# Patient Record
Sex: FEMALE | Race: WHITE | NOT HISPANIC OR LATINO | URBAN - METROPOLITAN AREA
[De-identification: names, ages, dates, MRNs, and addresses within clinical notes are randomized per-mention and may not be internally consistent; named-entity substitution may affect disease eponyms.]

---

## 2018-01-25 ENCOUNTER — INPATIENT (INPATIENT)
Facility: HOSPITAL | Age: 83
LOS: 5 days | Discharge: NURSING, REHAB OR LONG-TERM CARE | End: 2018-01-31
Attending: HOSPITALIST | Admitting: HOSPITALIST
Payer: MEDICARE

## 2018-01-25 VITALS
SYSTOLIC BLOOD PRESSURE: 145 MMHG | HEART RATE: 52 BPM | DIASTOLIC BLOOD PRESSURE: 62 MMHG | TEMPERATURE: 98 F | RESPIRATION RATE: 16 BRPM | OXYGEN SATURATION: 100 %

## 2018-01-25 DIAGNOSIS — R45.89 OTHER SYMPTOMS AND SIGNS INVOLVING EMOTIONAL STATE: ICD-10-CM

## 2018-01-25 LAB
ALBUMIN SERPL ELPH-MCNC: 4.1 G/DL — SIGNIFICANT CHANGE UP (ref 3.3–5)
ALP SERPL-CCNC: 91 U/L — SIGNIFICANT CHANGE UP (ref 40–120)
ALT FLD-CCNC: 22 U/L — SIGNIFICANT CHANGE UP (ref 4–33)
AMPHET UR-MCNC: NEGATIVE — SIGNIFICANT CHANGE UP
APAP SERPL-MCNC: < 15 UG/ML — LOW (ref 15–25)
APPEARANCE UR: CLEAR — SIGNIFICANT CHANGE UP
AST SERPL-CCNC: 23 U/L — SIGNIFICANT CHANGE UP (ref 4–32)
BACTERIA # UR AUTO: SIGNIFICANT CHANGE UP
BARBITURATES MEASUREMENT: NEGATIVE — SIGNIFICANT CHANGE UP
BARBITURATES UR SCN-MCNC: NEGATIVE — SIGNIFICANT CHANGE UP
BASE EXCESS BLDV CALC-SCNC: 3.1 MMOL/L — SIGNIFICANT CHANGE UP
BASOPHILS # BLD AUTO: 0.04 K/UL — SIGNIFICANT CHANGE UP (ref 0–0.2)
BASOPHILS NFR BLD AUTO: 0.5 % — SIGNIFICANT CHANGE UP (ref 0–2)
BENZODIAZ SERPL-MCNC: NEGATIVE — SIGNIFICANT CHANGE UP
BENZODIAZ UR-MCNC: NEGATIVE — SIGNIFICANT CHANGE UP
BILIRUB SERPL-MCNC: 0.4 MG/DL — SIGNIFICANT CHANGE UP (ref 0.2–1.2)
BILIRUB UR-MCNC: NEGATIVE — SIGNIFICANT CHANGE UP
BLOOD GAS VENOUS - CREATININE: 0.98 MG/DL — SIGNIFICANT CHANGE UP (ref 0.5–1.3)
BLOOD UR QL VISUAL: NEGATIVE — SIGNIFICANT CHANGE UP
BUN SERPL-MCNC: 17 MG/DL — SIGNIFICANT CHANGE UP (ref 7–23)
CALCIUM SERPL-MCNC: 9.1 MG/DL — SIGNIFICANT CHANGE UP (ref 8.4–10.5)
CANNABINOIDS UR-MCNC: NEGATIVE — SIGNIFICANT CHANGE UP
CHLORIDE BLDV-SCNC: 101 MMOL/L — SIGNIFICANT CHANGE UP (ref 96–108)
CHLORIDE SERPL-SCNC: 100 MMOL/L — SIGNIFICANT CHANGE UP (ref 98–107)
CO2 SERPL-SCNC: 22 MMOL/L — SIGNIFICANT CHANGE UP (ref 22–31)
COCAINE METAB.OTHER UR-MCNC: NEGATIVE — SIGNIFICANT CHANGE UP
COLOR SPEC: YELLOW — SIGNIFICANT CHANGE UP
CREAT SERPL-MCNC: 0.98 MG/DL — SIGNIFICANT CHANGE UP (ref 0.5–1.3)
EOSINOPHIL # BLD AUTO: 0.26 K/UL — SIGNIFICANT CHANGE UP (ref 0–0.5)
EOSINOPHIL NFR BLD AUTO: 3.1 % — SIGNIFICANT CHANGE UP (ref 0–6)
ETHANOL BLD-MCNC: < 10 MG/DL — SIGNIFICANT CHANGE UP
FOLATE SERPL-MCNC: > 20 NG/ML — HIGH (ref 4.7–20)
GAS PNL BLDV: 137 MMOL/L — SIGNIFICANT CHANGE UP (ref 136–146)
GLUCOSE BLDV-MCNC: 98 — SIGNIFICANT CHANGE UP (ref 70–99)
GLUCOSE SERPL-MCNC: 94 MG/DL — SIGNIFICANT CHANGE UP (ref 70–99)
GLUCOSE UR-MCNC: NEGATIVE — SIGNIFICANT CHANGE UP
HCO3 BLDV-SCNC: 25 MMOL/L — SIGNIFICANT CHANGE UP (ref 20–27)
HCT VFR BLD CALC: 38.9 % — SIGNIFICANT CHANGE UP (ref 34.5–45)
HCT VFR BLDV CALC: 40.7 % — SIGNIFICANT CHANGE UP (ref 34.5–45)
HGB BLD-MCNC: 12.4 G/DL — SIGNIFICANT CHANGE UP (ref 11.5–15.5)
HGB BLDV-MCNC: 13.2 G/DL — SIGNIFICANT CHANGE UP (ref 11.5–15.5)
IMM GRANULOCYTES # BLD AUTO: 0.02 # — SIGNIFICANT CHANGE UP
IMM GRANULOCYTES NFR BLD AUTO: 0.2 % — SIGNIFICANT CHANGE UP (ref 0–1.5)
KETONES UR-MCNC: NEGATIVE — SIGNIFICANT CHANGE UP
LACTATE BLDV-MCNC: 1.8 MMOL/L — SIGNIFICANT CHANGE UP (ref 0.5–2)
LEUKOCYTE ESTERASE UR-ACNC: NEGATIVE — SIGNIFICANT CHANGE UP
LYMPHOCYTES # BLD AUTO: 2.64 K/UL — SIGNIFICANT CHANGE UP (ref 1–3.3)
LYMPHOCYTES # BLD AUTO: 31.2 % — SIGNIFICANT CHANGE UP (ref 13–44)
MAGNESIUM SERPL-MCNC: 2.1 MG/DL — SIGNIFICANT CHANGE UP (ref 1.6–2.6)
MCHC RBC-ENTMCNC: 29.7 PG — SIGNIFICANT CHANGE UP (ref 27–34)
MCHC RBC-ENTMCNC: 31.9 % — LOW (ref 32–36)
MCV RBC AUTO: 93.3 FL — SIGNIFICANT CHANGE UP (ref 80–100)
METHADONE UR-MCNC: NEGATIVE — SIGNIFICANT CHANGE UP
MONOCYTES # BLD AUTO: 1.05 K/UL — HIGH (ref 0–0.9)
MONOCYTES NFR BLD AUTO: 12.4 % — SIGNIFICANT CHANGE UP (ref 2–14)
NEUTROPHILS # BLD AUTO: 4.45 K/UL — SIGNIFICANT CHANGE UP (ref 1.8–7.4)
NEUTROPHILS NFR BLD AUTO: 52.6 % — SIGNIFICANT CHANGE UP (ref 43–77)
NITRITE UR-MCNC: POSITIVE — HIGH
NRBC # FLD: 0 — SIGNIFICANT CHANGE UP
OPIATES UR-MCNC: NEGATIVE — SIGNIFICANT CHANGE UP
OXYCODONE UR-MCNC: NEGATIVE — SIGNIFICANT CHANGE UP
PCO2 BLDV: 52 MMHG — HIGH (ref 41–51)
PCP UR-MCNC: NEGATIVE — SIGNIFICANT CHANGE UP
PH BLDV: 7.35 PH — SIGNIFICANT CHANGE UP (ref 7.32–7.43)
PH UR: 6 — SIGNIFICANT CHANGE UP (ref 4.6–8)
PHOSPHATE SERPL-MCNC: 4.6 MG/DL — HIGH (ref 2.5–4.5)
PLATELET # BLD AUTO: 253 K/UL — SIGNIFICANT CHANGE UP (ref 150–400)
PMV BLD: 10.6 FL — SIGNIFICANT CHANGE UP (ref 7–13)
PO2 BLDV: 33 MMHG — LOW (ref 35–40)
POTASSIUM BLDV-SCNC: 4.5 MMOL/L — SIGNIFICANT CHANGE UP (ref 3.4–4.5)
POTASSIUM SERPL-MCNC: 4.8 MMOL/L — SIGNIFICANT CHANGE UP (ref 3.5–5.3)
POTASSIUM SERPL-SCNC: 4.8 MMOL/L — SIGNIFICANT CHANGE UP (ref 3.5–5.3)
PROT SERPL-MCNC: 6.8 G/DL — SIGNIFICANT CHANGE UP (ref 6–8.3)
PROT UR-MCNC: NEGATIVE MG/DL — SIGNIFICANT CHANGE UP
RBC # BLD: 4.17 M/UL — SIGNIFICANT CHANGE UP (ref 3.8–5.2)
RBC # FLD: 12.9 % — SIGNIFICANT CHANGE UP (ref 10.3–14.5)
SALICYLATES SERPL-MCNC: < 5 MG/DL — LOW (ref 15–30)
SAO2 % BLDV: 52.3 % — LOW (ref 60–85)
SODIUM SERPL-SCNC: 138 MMOL/L — SIGNIFICANT CHANGE UP (ref 135–145)
SP GR SPEC: 1.01 — SIGNIFICANT CHANGE UP (ref 1–1.04)
T4 FREE SERPL-MCNC: 1.05 NG/DL — SIGNIFICANT CHANGE UP (ref 0.9–1.8)
TSH SERPL-MCNC: 6.85 UIU/ML — HIGH (ref 0.27–4.2)
UROBILINOGEN FLD QL: NORMAL MG/DL — SIGNIFICANT CHANGE UP
VIT B12 SERPL-MCNC: 433 PG/ML — SIGNIFICANT CHANGE UP (ref 200–900)
WBC # BLD: 8.46 K/UL — SIGNIFICANT CHANGE UP (ref 3.8–10.5)
WBC # FLD AUTO: 8.46 K/UL — SIGNIFICANT CHANGE UP (ref 3.8–10.5)
WBC UR QL: SIGNIFICANT CHANGE UP (ref 0–?)

## 2018-01-25 PROCEDURE — 99223 1ST HOSP IP/OBS HIGH 75: CPT

## 2018-01-25 RX ORDER — CEFTRIAXONE 500 MG/1
1 INJECTION, POWDER, FOR SOLUTION INTRAMUSCULAR; INTRAVENOUS ONCE
Qty: 0 | Refills: 0 | Status: COMPLETED | OUTPATIENT
Start: 2018-01-25 | End: 2018-01-25

## 2018-01-25 RX ADMIN — CEFTRIAXONE 100 GRAM(S): 500 INJECTION, POWDER, FOR SOLUTION INTRAMUSCULAR; INTRAVENOUS at 21:40

## 2018-01-25 NOTE — H&P ADULT - NSHPPHYSICALEXAM_GEN_ALL_CORE
Vital Signs Last 24 Hrs  T(C): 36.8 (25 Jan 2018 23:46), Max: 36.8 (25 Jan 2018 23:46)  T(F): 98.2 (25 Jan 2018 23:46), Max: 98.2 (25 Jan 2018 23:46)  HR: 55 (25 Jan 2018 23:46) (52 - 60)  BP: 154/71 (25 Jan 2018 23:46) (145/62 - 166/70)  BP(mean): --  RR: 18 (25 Jan 2018 23:46) (16 - 18)  SpO2: 95% (25 Jan 2018 23:46) (95% - 100%)    PHYSICAL EXAM:  GENERAL: No Acute Distress  HEAD:  Atraumatic, Normocephalic  EYES: PERRL, conjunctiva and sclera clear  ENMT: Moist mucous membranes   NECK: Supple  CHEST/LUNG: Clear to auscultation bilaterally  HEART: Regular rate and rhythm; 3/6 systolic ejection murmur  ABDOMEN: Soft, Nontender, large ventral hernia; Bowel sounds normal  EXTREMITIES:   No clubbing or cyanosis, trace pedal edema  PSYCH: Calm and cooperative  NEUROLOGY:   - Mental status A&O x 2 (to self and "[facility]")  SKIN: No rashes or lesions  MUSCULOSKELETAL: No joint swelling

## 2018-01-25 NOTE — ED PROVIDER NOTE - MEDICAL DECISION MAKING DETAILS
Pt is an 84 y/o F w/ a PMHx of DM, Uterine CA, Dementia w/ frequent hospital admissions at other hospitals for frequent outbursts s/p d/c from [facility] after being treated for 3 days for UTI who p/w aggressive outburst likely related to progression of dementia, low suspicion for infectious etiology and metabolic etiology but will get CBC, CMP, TSH, B12, Folate, UA, urine Culture, CXR, EKG. Patient likely will need admission for frequent outbursts and inability for family to care for patient but will reassess after w/u. Pt is an 84 y/o F w/ a PMHx of DM, Uterine CA, Severe AS, Diverticulitis s/p hemicolectomy, Dementia w/ frequent hospital admissions at other hospitals for frequent outbursts (32 in past 15 months) s/p d/c from [facility] after being treated for 3 days for UTI who p/w aggressive outburst likely related to progression of dementia, low suspicion for infectious etiology and metabolic etiology but will get CBC, CMP, VBG, TSH, B12, Folate, UA, urine Culture, CXR, EKG. Low suspicion for obstruction or diverticulitis but will consider CT Patient likely will need admission for frequent outbursts and inability for family to care for patient but will reassess after w/u but will likely need admission.

## 2018-01-25 NOTE — H&P ADULT - PMH
Arthropathy left lower leg    Benign Heart Murmur    CA - Cancer of Uterus  endometrial  Cerebrospinal Meningitis    Closed Fracture Ankle, Bimalleolar  right ankle cast immobilization  DD (Diverticular Disease)    Diabetes Mellitus Type II    History of Hemorrhoidectomy    Hyperlipemia    Lumbar Spinal Stenosis    Obesity    Osteoarthritis of Lumbar Spine    Renal Calculus    Vitreous Detachment  right eye 1990    left eye 1995

## 2018-01-25 NOTE — ED PROVIDER NOTE - ATTENDING CONTRIBUTION TO CARE
DR. HERNANDEZ, ATTENDING MD-  I performed a face to face bedside interview with patient regarding history of present illness, review of symptoms and past medical history. I completed an independent physical exam.  I have discussed patient's plan of care with the resident.   Documentation as above in the note.    86 y/o female h/o dementia, dm, ut ca bib family for aggressive outbursts towards family and elopement risk.  Per son, pt has had [facility] and since dc home has made violent outbursts to family and was discovered trying to walk into the street when left alone.  Afebrile, vs wnl, nad, ctabil, s1s2 rrr no m/r/g, abd soft non ttp no r/g, no cva tenderness b/l, no leg swelling b/l, no rash.  Progressive dementia vs organic issue such as lyte abn / occult inf.  Obtain cbc, bmp, cxr, ua, will need admission as danger to self and others at home for likely placement.

## 2018-01-25 NOTE — H&P ADULT - NSHPLABSRESULTS_GEN_ALL_CORE
138  |  100  |  17  ----------------------------<  94  4.8   |  22  |  0.98    Ca    9.1      2018 20:16  Phos  4.6       Mg     2.1         TPro  6.8  /  Alb  4.1  /  TBili  0.4  /  DBili  x   /  AST  23  /  ALT  22  /  AlkPhos  91                              12.4   8.46  )-----------( 253      ( 2018 20:16 )             38.9           Urinalysis Basic - ( 2018 20:31 )    Color: YELLOW / Appearance: CLEAR / S.010 / pH: 6.0  Gluc: NEGATIVE / Ketone: NEGATIVE  / Bili: NEGATIVE / Urobili: NORMAL mg/dL   Blood: NEGATIVE / Protein: NEGATIVE mg/dL / Nitrite: POSITIVE   Leuk Esterase: NEGATIVE / RBC: x / WBC 0-2   Sq Epi: x / Non Sq Epi: x / Bacteria: FEW     138  |  100  |  17  ----------------------------<  94  4.8   |  22  |  0.98    Ca    9.1      2018 20:16  Phos  4.6       Mg     2.1         TPro  6.8  /  Alb  4.1  /  TBili  0.4  /  DBili  x   /  AST  23  /  ALT  22  /  AlkPhos  91                              12.4   8.46  )-----------( 253      ( 2018 20:16 )             38.9           Urinalysis Basic - ( 2018 20:31 )    Color: YELLOW / Appearance: CLEAR / S.010 / pH: 6.0  Gluc: NEGATIVE / Ketone: NEGATIVE  / Bili: NEGATIVE / Urobili: NORMAL mg/dL   Blood: NEGATIVE / Protein: NEGATIVE mg/dL / Nitrite: POSITIVE   Leuk Esterase: NEGATIVE / RBC: x / WBC 0-2   Sq Epi: x / Non Sq Epi: x / Bacteria: FEW    EKG tracing reviewed: ALLAN

## 2018-01-25 NOTE — ED PROVIDER NOTE - PSH
Abnormal Uterine Bleeding  D&C  Acute Lumbar Back Pain  epidural injections x3 and radiofrequecy ablation with little pain relief  2001 another radiofrequency ablation 2002 additional radiofrequency ablation    Epidural injections: 2003, 2005 2007  Arthroplasty of the Knee  left TKR 2009  Arthroplasty of the Knee  right TKA  Arthroplasty of the Knee  right knee revision secondary to fall  Carpal Tunnel Syndrome  left  release  Contracted, Tendon Sheath  left ankle  Hammertoe  left foot  History of Arthroscopy  left knee debridement  History of Arthroscopy of Knee  right knee  History of Laminectomy  with discectomy L4-L6  History of Tonsillectomy    Maxillary Sinus Polyp  polyp exc right side 1973  S/P Cataract Surgery  Left eye with IOL 11/2004    Right eye with IOL 12/2004  S/P Excision of Acoustic Neuroma  right ear  S/P Laminectomy  L3-L5  S/P TKR (Total Knee Replacement)  right TKA revision  S/P Total Hysterectomy  for endometrial CA

## 2018-01-25 NOTE — H&P ADULT - PROBLEM SELECTOR PLAN 5
- HSQ Mild TSH elevation with normal T4.  Doubt subclinical hypothyroidism is contributing to pt's behavioral issues.

## 2018-01-25 NOTE — ED PROVIDER NOTE - NS ED ROS FT
Unable to obtain from patient due to dementia (accuracy questionable) obtained from son (See HPI) Due to dementia (accuracy questionable) obtained some from son (See HPI)

## 2018-01-25 NOTE — H&P ADULT - ASSESSMENT
86 y/o F with HTN, HLd, dementia, severe AS, DM p/w continued episodes of agitation, confusion, and behavioral issues.

## 2018-01-25 NOTE — PATIENT PROFILE ADULT. - ABILITY TO HEAR (WITH HEARING AID OR HEARING APPLIANCE IF NORMALLY USED):
Mildly to Moderately Impaired: difficulty hearing in some environments or speaker may need to increase volume or speak distinctly/hearing impaired rt ear

## 2018-01-25 NOTE — ED PROVIDER NOTE - PROGRESS NOTE DETAILS
Spoke to hospitalist and will admit. Text paged MAR.   -Carlos Shelton PGY3 EMIM Pager#[phone]/Spectra#[phone]

## 2018-01-25 NOTE — PATIENT PROFILE ADULT. - VISION (WITH CORRECTIVE LENSES IF THE PATIENT USUALLY WEARS THEM):
Partially impaired: cannot see medication labels or newsprint, but can see obstacles in path, and the surrounding layout; can count fingers at arm's length/uses glassess

## 2018-01-25 NOTE — ED PROVIDER NOTE - OBJECTIVE STATEMENT
History obtained mainly from family at bedside.   Pt is an 84 y/o F w/ a PMHx of DM, Uterine CA, Dementia w/ frequent hospital admissions at other hospitals for frequent outbursts s/p d/c from [facility] after being treated for 3 days for UTI who p/w aggressive outburst at home today with family. Pt son at bedside states that she was at home with aides and her daughter and she started yelling she was sick and was very aggressive with family.     Family states that History obtained mainly from family at bedside.   Pt is an 86 y/o F w/ a PMHx of DM, Uterine CA, Severe AS, Diverticulitis s/p hemicolectomy, Dementia w/ frequent hospital admissions at other hospitals for frequent outbursts (32 in past 15 months) s/p d/c from [facility] after being treated for 3 days for UTI who p/w aggressive outburst at home today with family. Pt son at bedside states that she was at home with aides and her daughter and she started yelling she was sick and was very aggressive with family.     Patient denies cough, nasal congestion, abdominal pain, nausea, vomiting, recent antibiotic use, diarrhea, dysuria, urinary frequency, new rashes or injuries, headaches, neck stiffness, photophobia, and sick contacts.     Son states the only thing she has complained of is lower abdominal pain since [facility], no N/V and no further diarrhea, no fevers. Son states she lives with Brother and an aid and she frequently leaves the house and feels it is unsafe for her no and she is putting herself in danger. Patient walks around normally without issue. Pt states that patient admitted to [facility] 10 days ago for diarrhea and had Ct which was negative.   Meds: Buspar 15mg BID

## 2018-01-25 NOTE — ED ADULT TRIAGE NOTE - CHIEF COMPLAINT QUOTE
Pt comes from home secondary to dementia and according to son with  uncontrolled behavior, was released Tuesday from [facility] for UTI and dehydration, pt family unable to keep patient calm at home "needs  [facility]" pt calm and pleasant in ambay no complaints of pain

## 2018-01-25 NOTE — ED ADULT NURSE NOTE - OBJECTIVE STATEMENT
Pt brought in by son for pt acting out at home and being very confused.  States multiple recent hospitalizations for UTI and dementia.  A+OX1-2.  Float RN to obtain labs and place IV.  MD aware of pt status.

## 2018-01-25 NOTE — H&P ADULT - PROBLEM SELECTOR PLAN 3
- continue metoprolol  - previously was told that she was too high risk for surgery and risk of atheroembolism was too high for TAVR

## 2018-01-25 NOTE — H&P ADULT - HISTORY OF PRESENT ILLNESS
84 y/o F with DM, endometrial ca in remission s/p RYAN, HLD, HTN, dementia, and severe AS p/w episodes of agitation, and other behavioral issues.  Pt's son reports she has become increasingly difficult to manage.  Pt lives with her son with HHA during the daytime.  Pt has had multiple hospital admissions recently between [facility] and [facility] for episodes of agitation in the setting of diarrhea or UTI's.  Most recently, she was discharged from [facility] 3 days ago after treatment of UTI.  Pt, since then has had continued behavioral issues, and has been telling her son she has abdominal pain.  He is not sure if she truly has pain or is attempting to draw attention.  Son reports that pt has been "lashing out" described as yelling at him and his brother, but no physical violence.  Pt has had periods of waxing and waning confusion.  She also attempts to leave her home and wander the neighborhood without her HHA.  She also often calls EMS with various complaints including chest pain which son states he thinks she makes up.  She has been on various psychiatric and dementia medications including Aricept which was stopped 2/2 concern it was causing diarrhea, Seroquel which was d/c'd a few months ago, and buspirone which helps keep her behavior manageable.   No hallucinations as per son.    Pt presently reports no complaints.  She is aware she is in the hospital, but unsure why.  She presently feels well, and denies dysuria, abd pain, diarrhea or other complaints    In the ED, pt had nitrite + on UA, and was given ceftriaxone

## 2018-01-25 NOTE — ED PROVIDER NOTE - CARE PLAN
Principal Discharge DX:	Aggression Principal Discharge DX:	Aggression  Goal:	admit  Secondary Diagnosis:	UTI (urinary tract infection)

## 2018-01-26 DIAGNOSIS — R82.90 UNSPECIFIED ABNORMAL FINDINGS IN URINE: ICD-10-CM

## 2018-01-26 DIAGNOSIS — E11.9 TYPE 2 DIABETES MELLITUS WITHOUT COMPLICATIONS: ICD-10-CM

## 2018-01-26 DIAGNOSIS — R94.6 ABNORMAL RESULTS OF THYROID FUNCTION STUDIES: ICD-10-CM

## 2018-01-26 DIAGNOSIS — Z29.9 ENCOUNTER FOR PROPHYLACTIC MEASURES, UNSPECIFIED: ICD-10-CM

## 2018-01-26 DIAGNOSIS — G30.1 ALZHEIMER'S DISEASE WITH LATE ONSET: ICD-10-CM

## 2018-01-26 DIAGNOSIS — F05 DELIRIUM DUE TO KNOWN PHYSIOLOGICAL CONDITION: ICD-10-CM

## 2018-01-26 DIAGNOSIS — I35.0 NONRHEUMATIC AORTIC (VALVE) STENOSIS: ICD-10-CM

## 2018-01-26 LAB
GLUCOSE BLDC GLUCOMTR-MCNC: 108 MG/DL — HIGH (ref 70–99)
GLUCOSE BLDC GLUCOMTR-MCNC: 123 MG/DL — HIGH (ref 70–99)
GLUCOSE BLDC GLUCOMTR-MCNC: 93 MG/DL — SIGNIFICANT CHANGE UP (ref 70–99)

## 2018-01-26 PROCEDURE — 99233 SBSQ HOSP IP/OBS HIGH 50: CPT

## 2018-01-26 PROCEDURE — 99223 1ST HOSP IP/OBS HIGH 75: CPT

## 2018-01-26 RX ORDER — LORATADINE 10 MG/1
10 TABLET ORAL DAILY
Qty: 0 | Refills: 0 | Status: DISCONTINUED | OUTPATIENT
Start: 2018-01-26 | End: 2018-01-31

## 2018-01-26 RX ORDER — QUETIAPINE FUMARATE 200 MG/1
12.5 TABLET, FILM COATED ORAL EVERY 6 HOURS
Qty: 0 | Refills: 0 | Status: DISCONTINUED | OUTPATIENT
Start: 2018-01-26 | End: 2018-01-27

## 2018-01-26 RX ORDER — DEXTROSE 50 % IN WATER 50 %
25 SYRINGE (ML) INTRAVENOUS ONCE
Qty: 0 | Refills: 0 | Status: DISCONTINUED | OUTPATIENT
Start: 2018-01-26 | End: 2018-01-31

## 2018-01-26 RX ORDER — PANTOPRAZOLE SODIUM 20 MG/1
40 TABLET, DELAYED RELEASE ORAL
Qty: 0 | Refills: 0 | Status: DISCONTINUED | OUTPATIENT
Start: 2018-01-26 | End: 2018-01-31

## 2018-01-26 RX ORDER — DEXTROSE 50 % IN WATER 50 %
1 SYRINGE (ML) INTRAVENOUS ONCE
Qty: 0 | Refills: 0 | Status: DISCONTINUED | OUTPATIENT
Start: 2018-01-26 | End: 2018-01-31

## 2018-01-26 RX ORDER — INSULIN LISPRO 100/ML
VIAL (ML) SUBCUTANEOUS
Qty: 0 | Refills: 0 | Status: DISCONTINUED | OUTPATIENT
Start: 2018-01-26 | End: 2018-01-31

## 2018-01-26 RX ORDER — GLUCAGON INJECTION, SOLUTION 0.5 MG/.1ML
1 INJECTION, SOLUTION SUBCUTANEOUS ONCE
Qty: 0 | Refills: 0 | Status: DISCONTINUED | OUTPATIENT
Start: 2018-01-26 | End: 2018-01-31

## 2018-01-26 RX ORDER — METOPROLOL TARTRATE 50 MG
25 TABLET ORAL DAILY
Qty: 0 | Refills: 0 | Status: DISCONTINUED | OUTPATIENT
Start: 2018-01-26 | End: 2018-01-31

## 2018-01-26 RX ORDER — HEPARIN SODIUM 5000 [USP'U]/ML
5000 INJECTION INTRAVENOUS; SUBCUTANEOUS EVERY 8 HOURS
Qty: 0 | Refills: 0 | Status: DISCONTINUED | OUTPATIENT
Start: 2018-01-26 | End: 2018-01-31

## 2018-01-26 RX ORDER — INSULIN LISPRO 100/ML
VIAL (ML) SUBCUTANEOUS AT BEDTIME
Qty: 0 | Refills: 0 | Status: DISCONTINUED | OUTPATIENT
Start: 2018-01-26 | End: 2018-01-31

## 2018-01-26 RX ORDER — QUETIAPINE FUMARATE 200 MG/1
12.5 TABLET, FILM COATED ORAL
Qty: 0 | Refills: 0 | Status: DISCONTINUED | OUTPATIENT
Start: 2018-01-26 | End: 2018-01-29

## 2018-01-26 RX ORDER — DEXTROSE 50 % IN WATER 50 %
12.5 SYRINGE (ML) INTRAVENOUS ONCE
Qty: 0 | Refills: 0 | Status: DISCONTINUED | OUTPATIENT
Start: 2018-01-26 | End: 2018-01-31

## 2018-01-26 RX ORDER — ASPIRIN/CALCIUM CARB/MAGNESIUM 324 MG
81 TABLET ORAL DAILY
Qty: 0 | Refills: 0 | Status: DISCONTINUED | OUTPATIENT
Start: 2018-01-26 | End: 2018-01-31

## 2018-01-26 RX ADMIN — PANTOPRAZOLE SODIUM 40 MILLIGRAM(S): 20 TABLET, DELAYED RELEASE ORAL at 05:51

## 2018-01-26 RX ADMIN — QUETIAPINE FUMARATE 12.5 MILLIGRAM(S): 200 TABLET, FILM COATED ORAL at 21:16

## 2018-01-26 RX ADMIN — QUETIAPINE FUMARATE 12.5 MILLIGRAM(S): 200 TABLET, FILM COATED ORAL at 12:02

## 2018-01-26 RX ADMIN — LORATADINE 10 MILLIGRAM(S): 10 TABLET ORAL at 11:14

## 2018-01-26 RX ADMIN — Medication 25 MILLIGRAM(S): at 09:37

## 2018-01-26 RX ADMIN — HEPARIN SODIUM 5000 UNIT(S): 5000 INJECTION INTRAVENOUS; SUBCUTANEOUS at 05:51

## 2018-01-26 RX ADMIN — HEPARIN SODIUM 5000 UNIT(S): 5000 INJECTION INTRAVENOUS; SUBCUTANEOUS at 21:16

## 2018-01-26 RX ADMIN — HEPARIN SODIUM 5000 UNIT(S): 5000 INJECTION INTRAVENOUS; SUBCUTANEOUS at 17:23

## 2018-01-26 RX ADMIN — Medication 15 MILLIGRAM(S): at 09:37

## 2018-01-26 RX ADMIN — Medication 15 MILLIGRAM(S): at 17:23

## 2018-01-26 RX ADMIN — Medication 81 MILLIGRAM(S): at 11:14

## 2018-01-26 NOTE — PROGRESS NOTE ADULT - ASSESSMENT
86 y/o F with HTN, HLd, dementia, severe AS, DM p/w continued episodes of agitation, confusion, and behavioral issues. Patient is an 86 yo woman with HTN, HLd, dementia, severe AS, and DM admitted for behavioral disturbances and agitation likely due to progressive dementia. Patient is unsafe to be discharged home given family account of pt's decline in cognition.

## 2018-01-26 NOTE — PROGRESS NOTE ADULT - PROBLEM SELECTOR PLAN 1
- will continue buspirone   - buddy psych in am  - CM for ? increase in services at home Likely due to progressive symptoms. Patient currently calm and hemodynamically stable. Exam and labs unremarkable.   - will continue buspirone   - pending psych eval

## 2018-01-26 NOTE — BEHAVIORAL HEALTH ASSESSMENT NOTE - RISK ASSESSMENT
Pt is not at acute elevated risk to self or others. She has chronic risk factors including dementia, impulsive behavior that put her at a chronic elevated risk of harm to self/others. Protective factors include stability of domicile, lack of suicidality, lack of homicidality.

## 2018-01-26 NOTE — PROGRESS NOTE ADULT - SUBJECTIVE AND OBJECTIVE BOX
Patient is a 85y old  Female who presents with a chief complaint of agitation (2018 23:23)        SUBJECTIVE / OVERNIGHT EVENTS:      MEDICATIONS  (STANDING):  aspirin enteric coated 81 milliGRAM(s) Oral daily  busPIRone 15 milliGRAM(s) Oral <User Schedule>  dextrose 50% Injectable 12.5 Gram(s) IV Push once  dextrose 50% Injectable 25 Gram(s) IV Push once  dextrose 50% Injectable 25 Gram(s) IV Push once  heparin  Injectable 5000 Unit(s) SubCutaneous every 8 hours  insulin lispro (HumaLOG) corrective regimen sliding scale   SubCutaneous three times a day before meals  insulin lispro (HumaLOG) corrective regimen sliding scale   SubCutaneous at bedtime  loratadine 10 milliGRAM(s) Oral daily  metoprolol succinate ER 25 milliGRAM(s) Oral daily  pantoprazole    Tablet 40 milliGRAM(s) Oral before breakfast    MEDICATIONS  (PRN):  dextrose Gel 1 Dose(s) Oral once PRN Blood Glucose LESS THAN 70 milliGRAM(s)/deciliter  glucagon  Injectable 1 milliGRAM(s) IntraMuscular once PRN Glucose LESS THAN 70 milligrams/deciliter      Vital Signs Last 24 Hrs  T(C): 37.1 (2018 05:49), Max: 37.1 (2018 05:49)  T(F): 98.8 (2018 05:49), Max: 98.8 (2018 05:49)  HR: 61 (2018 09:24) (52 - 61)  BP: 153/63 (2018 09:24) (139/64 - 166/70)  BP(mean): --  RR: 18 (2018 05:49) (16 - 18)  SpO2: 96% (2018 05:49) (95% - 100%)  CAPILLARY BLOOD GLUCOSE      POCT Blood Glucose.: 111 mg/dL (2018 07:53)    I&O's Summary        PHYSICAL EXAM  GENERAL: NAD, well-developed  HEAD:  Atraumatic, Normocephalic  EYES: EOMI, PERRLA, conjunctiva and sclera clear  NECK: Supple, No JVD  CHEST/LUNG: Clear to auscultation bilaterally; No wheeze  HEART: Regular rate and rhythm; No murmurs, rubs, or gallops  ABDOMEN: Soft, Nontender, Nondistended; Bowel sounds present  EXTREMITIES:  2+ Peripheral Pulses, No clubbing, cyanosis, or edema  PSYCH: AAOx3  SKIN: No rashes or lesions    LABS:                        12.4   8.46  )-----------( 253      ( 2018 20:16 )             38.9         138  |  100  |  17  ----------------------------<  94  4.8   |  22  |  0.98    Ca    9.1      2018 20:16  Phos  4.6       Mg     2.1         TPro  6.8  /  Alb  4.1  /  TBili  0.4  /  DBili  x   /  AST  23  /  ALT  22  /  AlkPhos  91            Urinalysis Basic - ( 2018 20:31 )    Color: YELLOW / Appearance: CLEAR / S.010 / pH: 6.0  Gluc: NEGATIVE / Ketone: NEGATIVE  / Bili: NEGATIVE / Urobili: NORMAL mg/dL   Blood: NEGATIVE / Protein: NEGATIVE mg/dL / Nitrite: POSITIVE   Leuk Esterase: NEGATIVE / RBC: x / WBC 0-2   Sq Epi: x / Non Sq Epi: x / Bacteria: FEW        RADIOLOGY & ADDITIONAL TESTS:    Imaging Personally Reviewed:  Consultant(s) Notes Reviewed:    Care Discussed with Consultants/Other Providers: Patient is a 85y old  Female who presents with a chief complaint of agitation (2018 23:23)    SUBJECTIVE / OVERNIGHT EVENTS:  Patient with no complaints. She states she feels fine. She cannot recall why she is in the hospital. Spoke to patient's son, Terrell (also pt's HCP), who was present at bedside. He states patient is not safe at home due to progressive dementia. Patient wanders and is very forgetful. Terrell and his brother, Roberto, do not have enough support at home to be able to care for the patient. Therefore, Terrell would prefer pt be sent to long-term care facility.     MEDICATIONS  (STANDING):  aspirin enteric coated 81 milliGRAM(s) Oral daily  busPIRone 15 milliGRAM(s) Oral <User Schedule>  dextrose 50% Injectable 12.5 Gram(s) IV Push once  dextrose 50% Injectable 25 Gram(s) IV Push once  dextrose 50% Injectable 25 Gram(s) IV Push once  heparin  Injectable 5000 Unit(s) SubCutaneous every 8 hours  insulin lispro (HumaLOG) corrective regimen sliding scale   SubCutaneous three times a day before meals  insulin lispro (HumaLOG) corrective regimen sliding scale   SubCutaneous at bedtime  loratadine 10 milliGRAM(s) Oral daily  metoprolol succinate ER 25 milliGRAM(s) Oral daily  pantoprazole    Tablet 40 milliGRAM(s) Oral before breakfast    MEDICATIONS  (PRN):  dextrose Gel 1 Dose(s) Oral once PRN Blood Glucose LESS THAN 70 milliGRAM(s)/deciliter  glucagon  Injectable 1 milliGRAM(s) IntraMuscular once PRN Glucose LESS THAN 70 milligrams/deciliter      Vital Signs Last 24 Hrs  T(C): 37.1 (2018 05:49), Max: 37.1 (2018 05:49)  T(F): 98.8 (2018 05:49), Max: 98.8 (2018 05:49)  HR: 61 (2018 09:24) (52 - 61)  BP: 153/63 (2018 09:24) (139/64 - 166/70)  BP(mean): --  RR: 18 (2018 05:49) (16 - 18)  SpO2: 96% (2018 05:49) (95% - 100%)  CAPILLARY BLOOD GLUCOSE      POCT Blood Glucose.: 111 mg/dL (2018 07:53)        PHYSICAL EXAM  GENERAL: Elderly woman in NAD, well-developed  EYES: EOMI, normal conjunctiva and sclera clear  CHEST/LUNG: Clear to auscultation bilaterally; No wheeze  HEART: Regular rate and rhythm; Grade 3/6 systolic murmur heard throughout precordium. No rubs, or gallops  ABDOMEN: Soft, Nontender, Nondistended; Bowel sounds present. large RLQ protruding hernia - reducible, non-tender.  EXTREMITIES:  2+ Peripheral Pulses, No clubbing, cyanosis, or edema  PSYCH: Alert. Cooperative. Fluent speech. Pleasant  SKIN: No rashes or lesions      LABS:                        12.4   8.46  )-----------( 253      ( 2018 20:16 )             38.9         138  |  100  |  17  ----------------------------<  94  4.8   |  22  |  0.98    Ca    9.1      2018 20:16  Phos  4.6       Mg     2.1         TPro  6.8  /  Alb  4.1  /  TBili  0.4  /  DBili  x   /  AST  23  /  ALT  22  /  AlkPhos  91            Urinalysis Basic - ( 2018 20:31 )    Color: YELLOW / Appearance: CLEAR / S.010 / pH: 6.0  Gluc: NEGATIVE / Ketone: NEGATIVE  / Bili: NEGATIVE / Urobili: NORMAL mg/dL   Blood: NEGATIVE / Protein: NEGATIVE mg/dL / Nitrite: POSITIVE   Leuk Esterase: NEGATIVE / RBC: x / WBC 0-2   Sq Epi: x / Non Sq Epi: x / Bacteria: FEW        Consultant(s) Notes Reviewed:  Yes  Care Discussed with Consultants/Other Providers: Yes

## 2018-01-26 NOTE — PROGRESS NOTE ADULT - PROBLEM SELECTOR PLAN 2
+ Nitrite, but minimal WBC.  Likely colonization  - observe off abx and f/u culture + Nitrite, but minimal WBC.  Likely colonization. Patient asym[tomatic  - continue to observe off abx and f/u culture

## 2018-01-26 NOTE — PROGRESS NOTE ADULT - PROBLEM SELECTOR PLAN 4
- hold Tradjenta   - Sliding scale humalog - hold Tradjenta   - Sliding scale humalog  - check HbA1c

## 2018-01-26 NOTE — BEHAVIORAL HEALTH ASSESSMENT NOTE - HPI (INCLUDE ILLNESS QUALITY, SEVERITY, DURATION, TIMING, CONTEXT, MODIFYING FACTORS, ASSOCIATED SIGNS AND SYMPTOMS)
Pt is a 84 y/o F, domiciled with son, reported history of late onset Alzheimer's dementia with behavioral disturbances, unknown past psychiatric history, HHA during daytime, past medical history of DM, endometrial cancer in remission s/p RYAN, HLD, HTN, severe AS p/w episodes of agitation. Psychiatry called for management of agitation.     Per H&P, pt has had multiple hospital admissions recently between [facility] and [facility] for episodes of agitation in the setting of diarrhea or UTI's.  Most recently, she was discharged from [facility] 3 days ago after treatment of UTI.  Pt, since then has had continued behavioral issues, and has been telling her son she has abdominal pain.  He is not sure if she truly has pain or is attempting to draw attention.  Son reports that pt has been "lashing out" described as yelling at him and his brother, but no physical violence.  Pt has had periods of waxing and waning confusion.  She also attempts to leave her home and wander the neighborhood without her HHA.  She also often calls EMS with various complaints including chest pain which son states he thinks she makes up.  She has been on various psychiatric and dementia medications including Aricept which was stopped 2/2 concern it was causing diarrhea, Seroquel which was d/c'd a few months ago, and buspirone which helps keep her behavior manageable.   No hallucinations as per son.    On interview today, patient is calm and cooperative. Interview is limited secondary to confusion. She states that she does not know why she is in the hospital. She asks where her family is and states she wants to go home. States she lives with her  and younger son in [place]. Denies psychiatric history. She is oriented to her name, and situation (hospital), unable to name hospital. She is unable to name the state she is in, but knows that she lives in [place]. She states the year as 1990 and doesn't know the month or day. She is able to state her birthday and able to name the current president. She denies S/H/I/I/P, denies symptoms of depression. Denies alcohol/drug use. She feels safe in the hospital, but wants to go home. Denies auditory/visual hallucinations.

## 2018-01-26 NOTE — PROGRESS NOTE ADULT - PROBLEM SELECTOR PLAN 6
- HSQ - HSQ  - Contacts: Terrellpari Segalsingh (son/HCP) [phone]                  Roberto Segalsingh (son) [phone]      DISPO: Spoke with , Otto and Denisse WOODS today --> will initiate process to register pt for long-term facility care. Otherwise, patient with no acute medical issues. Patient is medically stable for discharge once facility placement is arranged.

## 2018-01-26 NOTE — BEHAVIORAL HEALTH ASSESSMENT NOTE - SUMMARY
Pt is a 86 y/o F, domiciled with son, reported history of late onset Alzheimer's dementia with behavioral disturbances, unknown past psychiatric history, HHA during daytime, past medical history of DM, endometrial cancer in remission s/p RYAN, HLD, HTN, severe AS p/w episodes of agitation. Psychiatry called for management of agitation. Per records, patient has been hospitalized numerous times for agitation in the setting of diarrhea or UTIs. She continues to have behavioral issues at home, lashing out, with waxing and waning confusion, though no violence. Has wandering behaviors and frequent somatic complaints. She has been trialled on Aricept, which was stopped secondary to diarrhea, and seroquel, which was D'C a few months ago for unknown reasons. Per son buspirone helps her behavior. Pt currently calm and cooperative with interview, though confused and oriented to situaton and name only. Presentation consistent with dementia with behavioral disturbances, r/out delirium secondary to general medical condition. Will obtain further collateral from patient's son today.     Plan:   1. Start Seroquel 12.5mg po q20:00, monitor ECG, maintain qTC < 500  2. Continue with home Buspar, 15mg po q9:00, q15:00  3. PRNS: Seroquel 12.5mg po q6 hours PRN for agitation  4. Patient does not require inpatient hospitalization. Pt is a 86 y/o F, domiciled with son, reported history of late onset Alzheimer's dementia with behavioral disturbances, unknown past psychiatric history, HHA during daytime, past medical history of DM, endometrial cancer in remission s/p RYAN, HLD, HTN, severe AS p/w episodes of agitation. Psychiatry called for management of agitation. Per records, patient has been hospitalized numerous times for agitation in the setting of diarrhea or UTIs. She continues to have behavioral issues at home, lashing out, with waxing and waning confusion, though no violence. Has wandering behaviors and frequent somatic complaints. She has been trialled on Aricept, which was stopped secondary to diarrhea, and seroquel, which was D'C a few months ago for unknown reasons. Per son buspirone helps her behavior. Pt currently calm and cooperative with interview, though confused and oriented to situation and name only. Presentation consistent with dementia with behavioral disturbances, r/out delirium secondary to general medical condition. Will obtain further collateral from patient's son today.     Plan:   1. Start Seroquel 12.5mg po q20:00, monitor ECG, maintain qTC < 500  2. Continue with home Buspar, 15mg po q9:00, q15:00  3. PRNS: Seroquel 12.5mg po q6 hours PRN for agitation  4. Patient does not require inpatient hospitalization.

## 2018-01-26 NOTE — BEHAVIORAL HEALTH ASSESSMENT NOTE - CASE SUMMARY
Pt seen, agree with above. Pt is an 86 y/o F h/o late onset Alzheimer's dementia with behavioral disturbances, PMH of DM, endometrial cancer in remission s/p RYAN, HLD, HTN, severe AS p/w episodes of agitation. Pt currently pleasant and calm on exam, oriented only to person, does not know why she's here. Asking to go home. Denies any SI/HI, no psychotic sx. Impression: late onset Alzheimer's with behavioral disturbance, r/o superimposed delirium process. Plan: as above- standing seroquel, with seroquel prns for agitation, continue buspar. Monitor qtc. Will follow.

## 2018-01-26 NOTE — BEHAVIORAL HEALTH ASSESSMENT NOTE - NSBHCHARTREVIEWLAB_PSY_A_CORE FT
CBC Full  -  ( 25 Jan 2018 20:16 )  WBC Count : 8.46 K/uL  Hemoglobin : 12.4 g/dL  Hematocrit : 38.9 %  Platelet Count - Automated : 253 K/uL  Mean Cell Volume : 93.3 fL  Mean Cell Hemoglobin : 29.7 pg  Mean Cell Hemoglobin Concentration : 31.9 %  Auto Neutrophil # : 4.45 K/uL  Auto Lymphocyte # : 2.64 K/uL  Auto Monocyte # : 1.05 K/uL  Auto Eosinophil # : 0.26 K/uL  Auto Basophil # : 0.04 K/uL  Auto Neutrophil % : 52.6 %  Auto Lymphocyte % : 31.2 %  Auto Monocyte % : 12.4 %  Auto Eosinophil % : 3.1 %  Auto Basophil % : 0.5 %  25 Jan 2018 20:16    138    |  100    |  17     ----------------------------<  94     4.8     |  22     |  0.98     Ca    9.1        25 Jan 2018 20:16  Phos  4.6       25 Jan 2018 20:16  Mg     2.1       25 Jan 2018 20:16    TPro  6.8    /  Alb  4.1    /  TBili  0.4    /  DBili  x      /  AST  23     /  ALT  22     /  AlkPhos  91     25 Jan 2018 20:16

## 2018-01-26 NOTE — PROGRESS NOTE ADULT - PROBLEM SELECTOR PLAN 3
- continue metoprolol  - previously was told that she was too high risk for surgery and risk of atheroembolism was too high for TAVR Patient not surgical candidate. Will continue home medical therapy.  - continue metoprolol

## 2018-01-26 NOTE — PROGRESS NOTE ADULT - PROBLEM SELECTOR PLAN 5
Mild TSH elevation with normal T4.  Doubt subclinical hypothyroidism is contributing to pt's behavioral issues. Mild TSH elevation with normal T4.  Doubt subclinical hypothyroidism is contributing to pt's behavioral issues.  - no further inpatient w/u

## 2018-01-26 NOTE — BEHAVIORAL HEALTH ASSESSMENT NOTE - NSBHCHARTREVIEWVS_PSY_A_CORE FT
T(C): 37.1 (01-26-18 @ 05:49)  T(F): 98.8 (01-26-18 @ 05:49), Max: 98.8 (01-26-18 @ 05:49)  HR: 61 (01-26-18 @ 09:24) (52 - 61)  BP: 153/63 (01-26-18 @ 09:24) (139/64 - 166/70)  RR:  (16 - 18)  SpO2:  (95% - 100%)  Wt(kg): --

## 2018-01-27 LAB
GLUCOSE BLDC GLUCOMTR-MCNC: 103 MG/DL — HIGH (ref 70–99)
GLUCOSE BLDC GLUCOMTR-MCNC: 114 MG/DL — HIGH (ref 70–99)
GLUCOSE BLDC GLUCOMTR-MCNC: 123 MG/DL — HIGH (ref 70–99)
GLUCOSE BLDC GLUCOMTR-MCNC: 136 MG/DL — HIGH (ref 70–99)

## 2018-01-27 PROCEDURE — 99233 SBSQ HOSP IP/OBS HIGH 50: CPT

## 2018-01-27 RX ORDER — NITROFURANTOIN MACROCRYSTAL 50 MG
100 CAPSULE ORAL
Qty: 0 | Refills: 0 | Status: DISCONTINUED | OUTPATIENT
Start: 2018-01-27 | End: 2018-01-31

## 2018-01-27 RX ORDER — LANOLIN ALCOHOL/MO/W.PET/CERES
3 CREAM (GRAM) TOPICAL AT BEDTIME
Qty: 0 | Refills: 0 | Status: DISCONTINUED | OUTPATIENT
Start: 2018-01-27 | End: 2018-01-31

## 2018-01-27 RX ORDER — ACETAMINOPHEN 500 MG
650 TABLET ORAL EVERY 6 HOURS
Qty: 0 | Refills: 0 | Status: DISCONTINUED | OUTPATIENT
Start: 2018-01-27 | End: 2018-01-31

## 2018-01-27 RX ADMIN — Medication 3 MILLIGRAM(S): at 22:06

## 2018-01-27 RX ADMIN — HEPARIN SODIUM 5000 UNIT(S): 5000 INJECTION INTRAVENOUS; SUBCUTANEOUS at 22:04

## 2018-01-27 RX ADMIN — HEPARIN SODIUM 5000 UNIT(S): 5000 INJECTION INTRAVENOUS; SUBCUTANEOUS at 06:18

## 2018-01-27 RX ADMIN — Medication 15 MILLIGRAM(S): at 14:41

## 2018-01-27 RX ADMIN — HEPARIN SODIUM 5000 UNIT(S): 5000 INJECTION INTRAVENOUS; SUBCUTANEOUS at 14:40

## 2018-01-27 RX ADMIN — PANTOPRAZOLE SODIUM 40 MILLIGRAM(S): 20 TABLET, DELAYED RELEASE ORAL at 06:18

## 2018-01-27 RX ADMIN — QUETIAPINE FUMARATE 12.5 MILLIGRAM(S): 200 TABLET, FILM COATED ORAL at 22:06

## 2018-01-27 RX ADMIN — Medication 25 MILLIGRAM(S): at 06:18

## 2018-01-27 RX ADMIN — Medication 81 MILLIGRAM(S): at 12:23

## 2018-01-27 RX ADMIN — LORATADINE 10 MILLIGRAM(S): 10 TABLET ORAL at 12:24

## 2018-01-27 RX ADMIN — Medication 15 MILLIGRAM(S): at 08:09

## 2018-01-27 RX ADMIN — Medication 100 MILLIGRAM(S): at 18:39

## 2018-01-27 NOTE — PROGRESS NOTE ADULT - PROBLEM SELECTOR PLAN 1
Likely due to progressive symptoms. Patient currently calm and hemodynamically stable. Exam and labs unremarkable.   - will continue buspirone   - Psych eval appreciated  - C/w seroquel standing  - DC PRN seroquel to expedite approval to LTC

## 2018-01-27 NOTE — PHYSICAL THERAPY INITIAL EVALUATION ADULT - PERTINENT HX OF CURRENT PROBLEM, REHAB EVAL
Pt. is a 85 year old female admitted to [facility] secondary to other symptoms and signs involving emotional state. PMH: benign heart murmur, lumbar spinal stenosis.

## 2018-01-27 NOTE — PROGRESS NOTE ADULT - PROBLEM SELECTOR PLAN 5
Mild TSH elevation with normal T4.  Doubt subclinical hypothyroidism is contributing to pt's behavioral issues.  - no further inpatient w/u

## 2018-01-27 NOTE — PROGRESS NOTE ADULT - SUBJECTIVE AND OBJECTIVE BOX
Patient is a 85y old  Female who presents with a chief complaint of agitation (2018 23:23)    SUBJECTIVE / OVERNIGHT EVENTS:  Patient with no complaints. No N/V or diarrhea    MEDICATIONS  (STANDING):  aspirin enteric coated 81 milliGRAM(s) Oral daily  busPIRone 15 milliGRAM(s) Oral <User Schedule>  dextrose 50% Injectable 12.5 Gram(s) IV Push once  dextrose 50% Injectable 25 Gram(s) IV Push once  dextrose 50% Injectable 25 Gram(s) IV Push once  heparin  Injectable 5000 Unit(s) SubCutaneous every 8 hours  insulin lispro (HumaLOG) corrective regimen sliding scale   SubCutaneous three times a day before meals  insulin lispro (HumaLOG) corrective regimen sliding scale   SubCutaneous at bedtime  loratadine 10 milliGRAM(s) Oral daily  metoprolol succinate ER 25 milliGRAM(s) Oral daily  pantoprazole    Tablet 40 milliGRAM(s) Oral before breakfast    MEDICATIONS  (PRN):  dextrose Gel 1 Dose(s) Oral once PRN Blood Glucose LESS THAN 70 milliGRAM(s)/deciliter  glucagon  Injectable 1 milliGRAM(s) IntraMuscular once PRN Glucose LESS THAN 70 milligrams/deciliter      Vital Signs Last 24 Hrs  T(C): 36.8 (2018 06:17), Max: 36.8 (2018 06:17)  T(F): 98.2 (2018 06:17), Max: 98.2 (2018 06:17)  HR: 53 (2018 06:17) (53 - 55)  BP: 114/58 (2018 06:17) (114/58 - 151/66)  BP(mean): --  RR: 18 (2018 06:17) (18 - 18)  SpO2: 98% (2018 06:17) (97% - 98%)      POCT Blood Glucose.: 111 mg/dL (2018 07:53)        PHYSICAL EXAM  GENERAL: Elderly woman in NAD, well-developed  EYES: EOMI, normal conjunctiva and sclera clear  CHEST/LUNG: Clear to auscultation bilaterally; No wheeze  HEART: Regular rate and rhythm; Grade 3/6 systolic murmur heard throughout precordium. No rubs, or gallops  ABDOMEN: Soft, Nontender, Nondistended; Bowel sounds present. large RLQ protruding hernia - reducible, non-tender.  EXTREMITIES:  2+ Peripheral Pulses, No clubbing, cyanosis, or edema  PSYCH: Alert. Cooperative. Fluent speech. Pleasant  SKIN: No rashes or lesions      LABS:                        12.4   8.46  )-----------( 253      ( 2018 20:16 )             38.9         138  |  100  |  17  ----------------------------<  94  4.8   |  22  |  0.98    Ca    9.1      2018 20:16  Phos  4.6       Mg     2.1         TPro  6.8  /  Alb  4.1  /  TBili  0.4  /  DBili  x   /  AST  23  /  ALT  22  /  AlkPhos  91            Urinalysis Basic - ( 2018 20:31 )    Color: YELLOW / Appearance: CLEAR / S.010 / pH: 6.0  Gluc: NEGATIVE / Ketone: NEGATIVE  / Bili: NEGATIVE / Urobili: NORMAL mg/dL   Blood: NEGATIVE / Protein: NEGATIVE mg/dL / Nitrite: POSITIVE   Leuk Esterase: NEGATIVE / RBC: x / WBC 0-2   Sq Epi: x / Non Sq Epi: x / Bacteria: FEW        Consultant(s) Notes Reviewed  Care Discussed with Consultants/Other Providers:

## 2018-01-27 NOTE — PROGRESS NOTE ADULT - ASSESSMENT
Patient is an 84 yo woman with HTN, HLd, dementia, severe AS, and DM admitted for behavioral disturbances and agitation likely due to progressive dementia. Patient is unsafe to be discharged home given family account of pt's decline in cognition.

## 2018-01-27 NOTE — PROGRESS NOTE ADULT - PROBLEM SELECTOR PLAN 6
- [facility]  - Contacts: Terrellpari Segalsingh (son/HCP) [phone]                  Roberto Segalsingh (son) [phone]      DISPO: Spoke with , Otto and Denisse WOODS today --> will initiate process to register pt for long-term facility care. Otherwise, patient with no acute medical issues. Patient is medically stable for discharge once facility placement is arranged.

## 2018-01-27 NOTE — PHYSICAL THERAPY INITIAL EVALUATION ADULT - CRITERIA FOR SKILLED THERAPEUTIC INTERVENTIONS
d/C to home. Pt. not on skilled therapy program secondary to pt. performing at her baseline performance.

## 2018-01-27 NOTE — PHYSICAL THERAPY INITIAL EVALUATION ADULT - ADDITIONAL COMMENTS
As per Son, pt. lives in a pvt home with her . Pt. was previously ambulating with a rolling walker independently and required varying assistance for all ADLs from A who came 7x/week for 8 hours a day. Pt. returned to bed with all lines/tubes intact, call bell in reach and in NAD.

## 2018-01-27 NOTE — PHYSICAL THERAPY INITIAL EVALUATION ADULT - DISCHARGE DISPOSITION, PT EVAL
no skilled PT needs/home/d/C to home. Pt. not on skilled therapy program secondary to pt. performing at her baseline performance.

## 2018-01-27 NOTE — PROGRESS NOTE ADULT - PROBLEM SELECTOR PLAN 2
+ Nitrite, but minimal WBC.  Likely colonization. Patient asymptomatic  - continue to observe off abx

## 2018-01-27 NOTE — PHYSICAL THERAPY INITIAL EVALUATION ADULT - PATIENT PROFILE REVIEW, REHAB EVAL
yes/Pt. profile reviewed, consulted with RN mana SHARMA  prior to initial PT evaluation and tx, as per RN, Pt. is OK to participate in skilled therapy session, current activity orders; ambulate with assistance

## 2018-01-28 LAB
BASOPHILS # BLD AUTO: 0.02 K/UL — SIGNIFICANT CHANGE UP (ref 0–0.2)
BASOPHILS NFR BLD AUTO: 0.3 % — SIGNIFICANT CHANGE UP (ref 0–2)
BUN SERPL-MCNC: 23 MG/DL — SIGNIFICANT CHANGE UP (ref 7–23)
CALCIUM SERPL-MCNC: 8.6 MG/DL — SIGNIFICANT CHANGE UP (ref 8.4–10.5)
CHLORIDE SERPL-SCNC: 101 MMOL/L — SIGNIFICANT CHANGE UP (ref 98–107)
CO2 SERPL-SCNC: 25 MMOL/L — SIGNIFICANT CHANGE UP (ref 22–31)
CREAT SERPL-MCNC: 1.06 MG/DL — SIGNIFICANT CHANGE UP (ref 0.5–1.3)
EOSINOPHIL # BLD AUTO: 0.36 K/UL — SIGNIFICANT CHANGE UP (ref 0–0.5)
EOSINOPHIL NFR BLD AUTO: 4.5 % — SIGNIFICANT CHANGE UP (ref 0–6)
GLUCOSE BLDC GLUCOMTR-MCNC: 107 MG/DL — HIGH (ref 70–99)
GLUCOSE BLDC GLUCOMTR-MCNC: 119 MG/DL — HIGH (ref 70–99)
GLUCOSE BLDC GLUCOMTR-MCNC: 119 MG/DL — HIGH (ref 70–99)
GLUCOSE BLDC GLUCOMTR-MCNC: 126 MG/DL — HIGH (ref 70–99)
GLUCOSE SERPL-MCNC: 113 MG/DL — HIGH (ref 70–99)
HBA1C BLD-MCNC: 6.2 % — HIGH (ref 4–5.6)
HCT VFR BLD CALC: 36.4 % — SIGNIFICANT CHANGE UP (ref 34.5–45)
HGB BLD-MCNC: 11.9 G/DL — SIGNIFICANT CHANGE UP (ref 11.5–15.5)
IMM GRANULOCYTES # BLD AUTO: 0.03 # — SIGNIFICANT CHANGE UP
IMM GRANULOCYTES NFR BLD AUTO: 0.4 % — SIGNIFICANT CHANGE UP (ref 0–1.5)
LYMPHOCYTES # BLD AUTO: 2.44 K/UL — SIGNIFICANT CHANGE UP (ref 1–3.3)
LYMPHOCYTES # BLD AUTO: 30.8 % — SIGNIFICANT CHANGE UP (ref 13–44)
MCHC RBC-ENTMCNC: 30.3 PG — SIGNIFICANT CHANGE UP (ref 27–34)
MCHC RBC-ENTMCNC: 32.7 % — SIGNIFICANT CHANGE UP (ref 32–36)
MCV RBC AUTO: 92.6 FL — SIGNIFICANT CHANGE UP (ref 80–100)
MONOCYTES # BLD AUTO: 0.85 K/UL — SIGNIFICANT CHANGE UP (ref 0–0.9)
MONOCYTES NFR BLD AUTO: 10.7 % — SIGNIFICANT CHANGE UP (ref 2–14)
NEUTROPHILS # BLD AUTO: 4.23 K/UL — SIGNIFICANT CHANGE UP (ref 1.8–7.4)
NEUTROPHILS NFR BLD AUTO: 53.3 % — SIGNIFICANT CHANGE UP (ref 43–77)
NRBC # FLD: 0 — SIGNIFICANT CHANGE UP
PLATELET # BLD AUTO: 227 K/UL — SIGNIFICANT CHANGE UP (ref 150–400)
PMV BLD: 11.1 FL — SIGNIFICANT CHANGE UP (ref 7–13)
POTASSIUM SERPL-MCNC: 4.7 MMOL/L — SIGNIFICANT CHANGE UP (ref 3.5–5.3)
POTASSIUM SERPL-SCNC: 4.7 MMOL/L — SIGNIFICANT CHANGE UP (ref 3.5–5.3)
RBC # BLD: 3.93 M/UL — SIGNIFICANT CHANGE UP (ref 3.8–5.2)
RBC # FLD: 13 % — SIGNIFICANT CHANGE UP (ref 10.3–14.5)
SODIUM SERPL-SCNC: 138 MMOL/L — SIGNIFICANT CHANGE UP (ref 135–145)
WBC # BLD: 7.93 K/UL — SIGNIFICANT CHANGE UP (ref 3.8–10.5)
WBC # FLD AUTO: 7.93 K/UL — SIGNIFICANT CHANGE UP (ref 3.8–10.5)

## 2018-01-28 PROCEDURE — 99232 SBSQ HOSP IP/OBS MODERATE 35: CPT

## 2018-01-28 RX ADMIN — HEPARIN SODIUM 5000 UNIT(S): 5000 INJECTION INTRAVENOUS; SUBCUTANEOUS at 13:50

## 2018-01-28 RX ADMIN — Medication 15 MILLIGRAM(S): at 14:54

## 2018-01-28 RX ADMIN — PANTOPRAZOLE SODIUM 40 MILLIGRAM(S): 20 TABLET, DELAYED RELEASE ORAL at 05:11

## 2018-01-28 RX ADMIN — Medication 100 MILLIGRAM(S): at 17:50

## 2018-01-28 RX ADMIN — HEPARIN SODIUM 5000 UNIT(S): 5000 INJECTION INTRAVENOUS; SUBCUTANEOUS at 05:11

## 2018-01-28 RX ADMIN — Medication 81 MILLIGRAM(S): at 12:11

## 2018-01-28 RX ADMIN — HEPARIN SODIUM 5000 UNIT(S): 5000 INJECTION INTRAVENOUS; SUBCUTANEOUS at 21:19

## 2018-01-28 RX ADMIN — Medication 100 MILLIGRAM(S): at 08:43

## 2018-01-28 RX ADMIN — Medication 25 MILLIGRAM(S): at 05:11

## 2018-01-28 RX ADMIN — Medication 15 MILLIGRAM(S): at 08:43

## 2018-01-28 RX ADMIN — QUETIAPINE FUMARATE 12.5 MILLIGRAM(S): 200 TABLET, FILM COATED ORAL at 21:18

## 2018-01-28 RX ADMIN — Medication 3 MILLIGRAM(S): at 21:19

## 2018-01-28 RX ADMIN — LORATADINE 10 MILLIGRAM(S): 10 TABLET ORAL at 12:11

## 2018-01-28 NOTE — PROGRESS NOTE ADULT - PROBLEM SELECTOR PLAN 2
Likely acute cystitis without hematuria per report by patient's son that patient experiences back pain when she gets a UTI  - Check UCx  - Start Macrobid 100mg PO BID for 5 days

## 2018-01-28 NOTE — PROGRESS NOTE ADULT - PROBLEM SELECTOR PLAN 5
Mild TSH elevation with normal T4.  Doubt subclinical hypothyroidism is contributing to pt's behavioral issues.  - no further inpatient w/u  - Re-check TFT's in 4-6 weeks

## 2018-01-28 NOTE — PROGRESS NOTE ADULT - SUBJECTIVE AND OBJECTIVE BOX
Patient is a 85y old  Female who presents with a chief complaint of agitation (2018 23:23)    SUBJECTIVE / OVERNIGHT EVENTS:  Patient with no complaints. No N/V or diarrhea    MEDICATIONS  (STANDING):  aspirin enteric coated 81 milliGRAM(s) Oral daily  busPIRone 15 milliGRAM(s) Oral <User Schedule>  dextrose 50% Injectable 12.5 Gram(s) IV Push once  dextrose 50% Injectable 25 Gram(s) IV Push once  dextrose 50% Injectable 25 Gram(s) IV Push once  heparin  Injectable 5000 Unit(s) SubCutaneous every 8 hours  insulin lispro (HumaLOG) corrective regimen sliding scale   SubCutaneous three times a day before meals  insulin lispro (HumaLOG) corrective regimen sliding scale   SubCutaneous at bedtime  loratadine 10 milliGRAM(s) Oral daily  melatonin 3 milliGRAM(s) Oral at bedtime  metoprolol succinate ER 25 milliGRAM(s) Oral daily  nitrofurantoin (MACROBID) 100 milliGRAM(s) Oral two times a day with meals  pantoprazole    Tablet 40 milliGRAM(s) Oral before breakfast  QUEtiapine 12.5 milliGRAM(s) Oral <User Schedule>    MEDICATIONS  (PRN):  acetaminophen   Tablet. 650 milliGRAM(s) Oral every 6 hours PRN Mild Pain (1 - 3)  dextrose Gel 1 Dose(s) Oral once PRN Blood Glucose LESS THAN 70 milliGRAM(s)/deciliter  glucagon  Injectable 1 milliGRAM(s) IntraMuscular once PRN Glucose LESS THAN 70 milligrams/deciliter    Vital Signs Last 24 Hrs  T(C): 36.5 (2018 05:08), Max: 37 (2018 13:38)  T(F): 97.7 (2018 05:08), Max: 98.6 (2018 13:38)  HR: 62 (2018 05:08) (59 - 62)  BP: 129/65 (2018 05:08) (118/49 - 129/65)  BP(mean): --  RR: 18 (2018 05:08) (18 - 20)  SpO2: 99% (2018 05:08) (98% - 99%)    POCT Blood Glucose.: 111 mg/dL (2018 07:53)    PHYSICAL EXAM  GENERAL: Elderly woman in NAD, well-developed  HEART: Regular rate and rhythm; Grade 3/6 systolic murmur heard throughout precordium. No rubs, or gallops  ABDOMEN: Soft, Nontender, Nondistended; Bowel sounds present. large RLQ protruding hernia - reducible, non-tender.    LABS:                        11.9   7.93  )-----------( 227      ( 2018 05:00 )             36.4   -    138  |  101  |  23  ----------------------------<  113<H>  4.7   |  25  |  1.06    Ca    8.6      2018 05:00      Urinalysis Basic - ( 2018 20:31 )    Color: YELLOW / Appearance: CLEAR / S.010 / pH: 6.0  Gluc: NEGATIVE / Ketone: NEGATIVE  / Bili: NEGATIVE / Urobili: NORMAL mg/dL   Blood: NEGATIVE / Protein: NEGATIVE mg/dL / Nitrite: POSITIVE   Leuk Esterase: NEGATIVE / RBC: x / WBC 0-2   Sq Epi: x / Non Sq Epi: x / Bacteria: FEW        Consultant(s) Notes Reviewed  Care Discussed with Consultants/Other Providers:

## 2018-01-28 NOTE — PROGRESS NOTE ADULT - PROBLEM SELECTOR PLAN 6
- [facility]  - Contacts: Terrell Mackay (son/[facility]) [phone]                  Roberto Mackay (son) [phone]

## 2018-01-29 LAB
BACTERIA UR CULT: SIGNIFICANT CHANGE UP
GLUCOSE BLDC GLUCOMTR-MCNC: 113 MG/DL — HIGH (ref 70–99)
GLUCOSE BLDC GLUCOMTR-MCNC: 151 MG/DL — HIGH (ref 70–99)
SPECIMEN SOURCE: SIGNIFICANT CHANGE UP

## 2018-01-29 PROCEDURE — 99232 SBSQ HOSP IP/OBS MODERATE 35: CPT

## 2018-01-29 PROCEDURE — 99233 SBSQ HOSP IP/OBS HIGH 50: CPT

## 2018-01-29 RX ORDER — QUETIAPINE FUMARATE 200 MG/1
12.5 TABLET, FILM COATED ORAL
Qty: 0 | Refills: 0 | Status: DISCONTINUED | OUTPATIENT
Start: 2018-01-29 | End: 2018-01-31

## 2018-01-29 RX ORDER — QUETIAPINE FUMARATE 200 MG/1
25 TABLET, FILM COATED ORAL
Qty: 0 | Refills: 0 | Status: DISCONTINUED | OUTPATIENT
Start: 2018-01-29 | End: 2018-01-31

## 2018-01-29 RX ADMIN — Medication 3 MILLIGRAM(S): at 23:20

## 2018-01-29 RX ADMIN — HEPARIN SODIUM 5000 UNIT(S): 5000 INJECTION INTRAVENOUS; SUBCUTANEOUS at 23:20

## 2018-01-29 RX ADMIN — PANTOPRAZOLE SODIUM 40 MILLIGRAM(S): 20 TABLET, DELAYED RELEASE ORAL at 06:44

## 2018-01-29 RX ADMIN — Medication 81 MILLIGRAM(S): at 11:25

## 2018-01-29 RX ADMIN — Medication 15 MILLIGRAM(S): at 11:24

## 2018-01-29 RX ADMIN — HEPARIN SODIUM 5000 UNIT(S): 5000 INJECTION INTRAVENOUS; SUBCUTANEOUS at 05:21

## 2018-01-29 RX ADMIN — Medication 100 MILLIGRAM(S): at 11:27

## 2018-01-29 RX ADMIN — Medication 15 MILLIGRAM(S): at 17:21

## 2018-01-29 RX ADMIN — Medication 100 MILLIGRAM(S): at 17:21

## 2018-01-29 RX ADMIN — LORATADINE 10 MILLIGRAM(S): 10 TABLET ORAL at 11:25

## 2018-01-29 RX ADMIN — QUETIAPINE FUMARATE 25 MILLIGRAM(S): 200 TABLET, FILM COATED ORAL at 23:21

## 2018-01-29 RX ADMIN — Medication 1: at 12:07

## 2018-01-29 NOTE — PROGRESS NOTE ADULT - PROBLEM SELECTOR PLAN 6
- [facility]  - Contacts: Terrell Mackay (son/[facility]) [phone]                  Roberto Mackay (son) [phone]

## 2018-01-29 NOTE — PROGRESS NOTE BEHAVIORAL HEALTH - SUMMARY
Pt is a 84 y/o F, domiciled with son, reported history of late onset Alzheimer's dementia with behavioral disturbances, unknown past psychiatric history, HHA during daytime, past medical history of DM, endometrial cancer in remission s/p RYAN, HLD, HTN, severe AS p/w episodes of agitation. Psychiatry called for management of agitation. Per records, patient has been hospitalized numerous times for agitation in the setting of diarrhea or UTIs. She continues to have behavioral issues at home, lashing out, with waxing and waning confusion, though no violence. Has wandering behaviors and frequent somatic complaints. She has been trialled on Aricept, which was stopped secondary to diarrhea, and seroquel, which was D'C a few months ago for unknown reasons. Per son, buspirone helps her behavior.     Today, son states that patient was on Seroquel 50mg BID in the past, but was told this medication was no good and it was discontinued. Son expresses concern that buspirone has not fully kicked in and that patient will be difficult to manage when she is discharged. Currently patient is living with one of her sons and has an aide for 9-5. Son is currently working with , discussing future living arrangements for patient. Son believes that she needs more care at home/possibly 24 hour care. Pt currently calm and cooperative with interview. Presentation consistent with dementia with behavioral disturbances, r/out delirium secondary to general medical condition. Will obtain further collateral from patient's son today.     Plan:   1. Start Seroquel 25mg po q20:00, monitor ECG, maintain qTC < 500  2. Start Seroquel 12.5mg po qAM, monitor ECG, maintain qTC < 500  3. Continue with home Buspar, 15mg po q9:00, q15:00  4. PRNS: Seroquel 12.5mg po q6 hours PRN for agitation  5. Patient does not require inpatient hospitalization. Pt is a 86 y/o F, domiciled with son, reported history of late onset Alzheimer's dementia with behavioral disturbances, unknown past psychiatric history, HHA during daytime, past medical history of DM, endometrial cancer in remission s/p RYAN, HLD, HTN, severe AS p/w episodes of agitation. Psychiatry called for management of agitation. Per records, patient has been hospitalized numerous times for agitation in the setting of diarrhea or UTIs. She continues to have behavioral issues at home, lashing out, with waxing and waning confusion, though no violence. Has wandering behaviors and frequent somatic complaints. She has been trialled on Aricept, which was stopped secondary to diarrhea, and seroquel, which was D'C a few months ago for unknown reasons. Per son, buspirone helps her behavior.     Today, son states that patient was on Seroquel 25mg BID in the past, but was told this medication not used in dementia patients and therefore it was discontinued. Son expresses concern that buspirone has not fully kicked in and that patient will be difficult to manage when she is discharged. Currently patient is living with one of her sons and has an aide for 9-5. Son is currently working with , discussing future living arrangements for patient. Son believes that she needs more care at home/possibly 24 hour care. Pt currently calm and cooperative with interview. Presentation consistent with dementia with behavioral disturbances, r/out delirium secondary to general medical condition. Will obtain further collateral from patient's son today.     Plan:   1. Titrate PM Seroquel dose to 25mg po q20:00, monitor ECG, maintain qTC < 500  2. Start Seroquel 12.5mg po qAM, monitor ECG, maintain qTC < 500  3. Continue with home Buspar, 15mg po q9:00, q15:00  4. PRNS: Seroquel 12.5mg po q6 hours PRN for agitation  5. Patient does not require inpatient hospitalization.

## 2018-01-29 NOTE — PROGRESS NOTE ADULT - PROBLEM SELECTOR PLAN 2
Likely acute cystitis without hematuria per report by patient's son that patient experiences back pain when she gets a UTI  - C/w Macrobid 100mg PO BID for 5 days

## 2018-01-29 NOTE — PROGRESS NOTE BEHAVIORAL HEALTH - NSBHADMITCOUNSEL_PSY_A_CORE
risks and benefits of treatment options/client/family/caregiver education/importance of adherence to chosen treatment

## 2018-01-29 NOTE — PROGRESS NOTE BEHAVIORAL HEALTH - NSBHFUPINTERVALHXFT_PSY_A_CORE
Patient seen in follow-up for agitation. Chart reviewed. No overnight events/no PRNs needed. Upon interview, patient is oriented to person and place (knew she was in hospital but not name), but was unaware or month/season/year. Patient is calm and cooperative. States that she is eating and sleeping okay and that she feels safe here. Patient states that she is frustrated because she is not sure why she is her.  Son present for interview, states that patient is confused at baseline and is becoming more difficult to care for at home, probably needs 24 hour supervision as she has tried to leave before and occasionally becomes agitated. Patient seen in follow-up for agitation. Chart reviewed. No overnight events/no PRNs needed. Upon interview, patient is oriented to person and place (knew she was in hospital but not name), but was unaware or month/season/year. Patient is calm and cooperative. States that she is eating and sleeping okay and that she feels safe here. Patient states that she is frustrated because she is not sure why she is here.  Son present for interview, states that patient is confused at baseline and is becoming more difficult to care for at home, feels she needs 24 hour supervision as she has tried to leave before and occasionally becomes agitated. He reports pt was previously taking Seroquel 25mg po bid which he felt was helpful for her.

## 2018-01-29 NOTE — PROGRESS NOTE BEHAVIORAL HEALTH - NSBHCHARTREVIEWLAB_PSY_A_CORE FT
CBC Full  -  ( 28 Jan 2018 05:00 )  WBC Count : 7.93 K/uL  Hemoglobin : 11.9 g/dL  Hematocrit : 36.4 %  Platelet Count - Automated : 227 K/uL  Mean Cell Volume : 92.6 fL  Mean Cell Hemoglobin : 30.3 pg  Mean Cell Hemoglobin Concentration : 32.7 %  Auto Neutrophil # : 4.23 K/uL  Auto Lymphocyte # : 2.44 K/uL  Auto Monocyte # : 0.85 K/uL  Auto Eosinophil # : 0.36 K/uL  Auto Basophil # : 0.02 K/uL  Auto Neutrophil % : 53.3 %  Auto Lymphocyte % : 30.8 %  Auto Monocyte % : 10.7 %  Auto Eosinophil % : 4.5 %  Auto Basophil % : 0.3 %

## 2018-01-29 NOTE — PROGRESS NOTE ADULT - SUBJECTIVE AND OBJECTIVE BOX
Patient is a 85y old  Female who presents with a chief complaint of agitation (25 Jan 2018 23:23)    SUBJECTIVE / OVERNIGHT EVENTS:  Patient with no complaints. No N/V or diarrhea    MEDICATIONS  (STANDING):  aspirin enteric coated 81 milliGRAM(s) Oral daily  busPIRone 15 milliGRAM(s) Oral <User Schedule>  dextrose 50% Injectable 12.5 Gram(s) IV Push once  dextrose 50% Injectable 25 Gram(s) IV Push once  dextrose 50% Injectable 25 Gram(s) IV Push once  heparin  Injectable 5000 Unit(s) SubCutaneous every 8 hours  insulin lispro (HumaLOG) corrective regimen sliding scale   SubCutaneous three times a day before meals  insulin lispro (HumaLOG) corrective regimen sliding scale   SubCutaneous at bedtime  loratadine 10 milliGRAM(s) Oral daily  melatonin 3 milliGRAM(s) Oral at bedtime  metoprolol succinate ER 25 milliGRAM(s) Oral daily  nitrofurantoin (MACROBID) 100 milliGRAM(s) Oral two times a day with meals  pantoprazole    Tablet 40 milliGRAM(s) Oral before breakfast  QUEtiapine 12.5 milliGRAM(s) Oral <User Schedule>  QUEtiapine 25 milliGRAM(s) Oral <User Schedule>    MEDICATIONS  (PRN):  acetaminophen   Tablet. 650 milliGRAM(s) Oral every 6 hours PRN Mild Pain (1 - 3)  dextrose Gel 1 Dose(s) Oral once PRN Blood Glucose LESS THAN 70 milliGRAM(s)/deciliter  glucagon  Injectable 1 milliGRAM(s) IntraMuscular once PRN Glucose LESS THAN 70 milligrams/deciliter    Vital Signs Last 24 Hrs  T(C): 36.5 (29 Jan 2018 13:41), Max: 36.6 (28 Jan 2018 19:08)  T(F): 97.7 (29 Jan 2018 13:41), Max: 97.9 (28 Jan 2018 19:08)  HR: 59 (29 Jan 2018 13:41) (58 - 64)  BP: 136/58 (29 Jan 2018 13:41) (123/51 - 136/58)  BP(mean): --  RR: 18 (29 Jan 2018 13:41) (18 - 18)  SpO2: 98% (29 Jan 2018 13:41) (97% - 99%)    PHYSICAL EXAM  GENERAL: Elderly woman in NAD, well-developed  HEART: Regular rate and rhythm; Grade 3/6 systolic murmur heard throughout precordium. No rubs, or gallops  ABDOMEN: Soft, Nontender, Nondistended; Bowel sounds present. large RLQ protruding hernia - reducible, non-tender.    LABS:                        11.9   7.93  )-----------( 227      ( 28 Jan 2018 05:00 )             36.4   01-28    138  |  101  |  23  ----------------------------<  113<H>  4.7   |  25  |  1.06    Ca    8.6      28 Jan 2018 05:00      Consultant(s) Notes Reviewed    Care Discussed with Consultants/Other Providers: Patient is a 85y old  Female who presents with a chief complaint of agitation (25 Jan 2018 23:23)    SUBJECTIVE / OVERNIGHT EVENTS:  Patient with no complaints. No Nausea.     MEDICATIONS  (STANDING):  aspirin enteric coated 81 milliGRAM(s) Oral daily  busPIRone 15 milliGRAM(s) Oral <User Schedule>  dextrose 50% Injectable 12.5 Gram(s) IV Push once  dextrose 50% Injectable 25 Gram(s) IV Push once  dextrose 50% Injectable 25 Gram(s) IV Push once  heparin  Injectable 5000 Unit(s) SubCutaneous every 8 hours  insulin lispro (HumaLOG) corrective regimen sliding scale   SubCutaneous three times a day before meals  insulin lispro (HumaLOG) corrective regimen sliding scale   SubCutaneous at bedtime  loratadine 10 milliGRAM(s) Oral daily  melatonin 3 milliGRAM(s) Oral at bedtime  metoprolol succinate ER 25 milliGRAM(s) Oral daily  nitrofurantoin (MACROBID) 100 milliGRAM(s) Oral two times a day with meals  pantoprazole    Tablet 40 milliGRAM(s) Oral before breakfast  QUEtiapine 12.5 milliGRAM(s) Oral <User Schedule>  QUEtiapine 25 milliGRAM(s) Oral <User Schedule>    MEDICATIONS  (PRN):  acetaminophen   Tablet. 650 milliGRAM(s) Oral every 6 hours PRN Mild Pain (1 - 3)  dextrose Gel 1 Dose(s) Oral once PRN Blood Glucose LESS THAN 70 milliGRAM(s)/deciliter  glucagon  Injectable 1 milliGRAM(s) IntraMuscular once PRN Glucose LESS THAN 70 milligrams/deciliter    Vital Signs Last 24 Hrs  T(C): 36.5 (29 Jan 2018 13:41), Max: 36.6 (28 Jan 2018 19:08)  T(F): 97.7 (29 Jan 2018 13:41), Max: 97.9 (28 Jan 2018 19:08)  HR: 59 (29 Jan 2018 13:41) (58 - 64)  BP: 136/58 (29 Jan 2018 13:41) (123/51 - 136/58)  BP(mean): --  RR: 18 (29 Jan 2018 13:41) (18 - 18)  SpO2: 98% (29 Jan 2018 13:41) (97% - 99%)    PHYSICAL EXAM  GENERAL: Elderly woman in NAD, well-developed  HEART: Regular rate and rhythm; Grade 3/6 systolic murmur heard throughout precordium. No rubs, or gallops  ABDOMEN: Soft, Nontender, Nondistended; Bowel sounds present. large RLQ protruding hernia - reducible, non-tender.    LABS:                        11.9   7.93  )-----------( 227      ( 28 Jan 2018 05:00 )             36.4   01-28    138  |  101  |  23  ----------------------------<  113<H>  4.7   |  25  |  1.06    Ca    8.6      28 Jan 2018 05:00      Consultant(s) Notes Reviewed    Care Discussed with Consultants/Other Providers:

## 2018-01-30 PROCEDURE — 99232 SBSQ HOSP IP/OBS MODERATE 35: CPT

## 2018-01-30 RX ADMIN — Medication 3 MILLIGRAM(S): at 21:24

## 2018-01-30 RX ADMIN — Medication 100 MILLIGRAM(S): at 08:54

## 2018-01-30 RX ADMIN — Medication 100 MILLIGRAM(S): at 16:30

## 2018-01-30 RX ADMIN — HEPARIN SODIUM 5000 UNIT(S): 5000 INJECTION INTRAVENOUS; SUBCUTANEOUS at 16:30

## 2018-01-30 RX ADMIN — HEPARIN SODIUM 5000 UNIT(S): 5000 INJECTION INTRAVENOUS; SUBCUTANEOUS at 21:24

## 2018-01-30 RX ADMIN — LORATADINE 10 MILLIGRAM(S): 10 TABLET ORAL at 16:30

## 2018-01-30 RX ADMIN — PANTOPRAZOLE SODIUM 40 MILLIGRAM(S): 20 TABLET, DELAYED RELEASE ORAL at 06:35

## 2018-01-30 RX ADMIN — Medication 25 MILLIGRAM(S): at 06:35

## 2018-01-30 RX ADMIN — QUETIAPINE FUMARATE 12.5 MILLIGRAM(S): 200 TABLET, FILM COATED ORAL at 08:54

## 2018-01-30 RX ADMIN — QUETIAPINE FUMARATE 25 MILLIGRAM(S): 200 TABLET, FILM COATED ORAL at 19:55

## 2018-01-30 RX ADMIN — Medication 81 MILLIGRAM(S): at 16:30

## 2018-01-30 RX ADMIN — Medication 15 MILLIGRAM(S): at 08:54

## 2018-01-30 RX ADMIN — HEPARIN SODIUM 5000 UNIT(S): 5000 INJECTION INTRAVENOUS; SUBCUTANEOUS at 06:35

## 2018-01-30 RX ADMIN — Medication 15 MILLIGRAM(S): at 16:30

## 2018-01-30 NOTE — PROGRESS NOTE ADULT - PROBLEM SELECTOR PLAN 6
- Hospitals in [place]  - Contacts: Terrell Mackay (son/[facility]) [phone]                  Roberto Mackay (son) [phone]

## 2018-01-31 ENCOUNTER — TRANSCRIPTION ENCOUNTER (OUTPATIENT)
Age: 83
End: 2018-01-31

## 2018-01-31 VITALS
TEMPERATURE: 98 F | HEART RATE: 65 BPM | RESPIRATION RATE: 17 BRPM | DIASTOLIC BLOOD PRESSURE: 55 MMHG | SYSTOLIC BLOOD PRESSURE: 150 MMHG | OXYGEN SATURATION: 100 %

## 2018-01-31 PROCEDURE — 99233 SBSQ HOSP IP/OBS HIGH 50: CPT

## 2018-01-31 PROCEDURE — 99239 HOSP IP/OBS DSCHRG MGMT >30: CPT

## 2018-01-31 RX ORDER — QUETIAPINE FUMARATE 200 MG/1
12.5 TABLET, FILM COATED ORAL
Qty: 0 | Refills: 0 | COMMUNITY
Start: 2018-01-31

## 2018-01-31 RX ORDER — LANOLIN ALCOHOL/MO/W.PET/CERES
1 CREAM (GRAM) TOPICAL
Qty: 0 | Refills: 0 | COMMUNITY
Start: 2018-01-31

## 2018-01-31 RX ORDER — LOPERAMIDE HCL 2 MG
1 TABLET ORAL
Qty: 0 | Refills: 0 | COMMUNITY

## 2018-01-31 RX ORDER — QUETIAPINE FUMARATE 200 MG/1
1 TABLET, FILM COATED ORAL
Qty: 0 | Refills: 0 | COMMUNITY
Start: 2018-01-31

## 2018-01-31 RX ORDER — LINAGLIPTIN 5 MG/1
1 TABLET, FILM COATED ORAL
Qty: 0 | Refills: 0 | COMMUNITY

## 2018-01-31 RX ADMIN — Medication 25 MILLIGRAM(S): at 13:15

## 2018-01-31 RX ADMIN — Medication 15 MILLIGRAM(S): at 14:04

## 2018-01-31 RX ADMIN — HEPARIN SODIUM 5000 UNIT(S): 5000 INJECTION INTRAVENOUS; SUBCUTANEOUS at 05:17

## 2018-01-31 RX ADMIN — HEPARIN SODIUM 5000 UNIT(S): 5000 INJECTION INTRAVENOUS; SUBCUTANEOUS at 13:15

## 2018-01-31 RX ADMIN — Medication 15 MILLIGRAM(S): at 09:15

## 2018-01-31 RX ADMIN — Medication 81 MILLIGRAM(S): at 13:15

## 2018-01-31 RX ADMIN — PANTOPRAZOLE SODIUM 40 MILLIGRAM(S): 20 TABLET, DELAYED RELEASE ORAL at 05:17

## 2018-01-31 RX ADMIN — QUETIAPINE FUMARATE 12.5 MILLIGRAM(S): 200 TABLET, FILM COATED ORAL at 09:15

## 2018-01-31 RX ADMIN — LORATADINE 10 MILLIGRAM(S): 10 TABLET ORAL at 13:15

## 2018-01-31 RX ADMIN — Medication 100 MILLIGRAM(S): at 09:14

## 2018-01-31 NOTE — PROGRESS NOTE BEHAVIORAL HEALTH - NSBHFUPINTERVALHXFT_PSY_A_CORE
Patient seen in follow up for dementia/agitation. Chart reviewed. No overnight events. Upon interview patient is oriented to self, general location, and month. Patient did not remember name of hospital or year. Patient states that she is feeling good. Patient slept through the night and has been eating well. Patient denies SI/HI and AVH. Patient currently waiting for finalization for living arraignments with [facility].

## 2018-01-31 NOTE — PROGRESS NOTE ADULT - PROBLEM SELECTOR PROBLEM 3
Severe aortic stenosis

## 2018-01-31 NOTE — PROGRESS NOTE ADULT - PROBLEM SELECTOR PLAN 6
- hospitals  - Contacts: Terrell Mackay (son/[facility]) [phone]                  Roberto Mackay (son) [phone]

## 2018-01-31 NOTE — DISCHARGE NOTE ADULT - MEDICATION SUMMARY - MEDICATIONS TO STOP TAKING
I will STOP taking the medications listed below when I get home from the hospital:    Tessalon Perles 100 mg oral capsule  -- 1 cap(s) by mouth 3 times a day, As Needed    Tradjenta 5 mg oral tablet  -- 1 tab(s) by mouth once a day    Imodium 2 mg oral capsule  -- 1 cap(s) by mouth every 4 hours, As Needed

## 2018-01-31 NOTE — DISCHARGE NOTE ADULT - MEDICATION SUMMARY - MEDICATIONS TO TAKE
I will START or STAY ON the medications listed below when I get home from the hospital:    aspirin 81 mg oral delayed release tablet  -- 1 tab(s) by mouth once a day  -- Indication: For CAD    cetirizine 10 mg oral tablet  -- 1 tab(s) by mouth once a day  -- Indication: For Prophylactic measure    QUEtiapine  -- 12.5 milligram(s) by mouth once a day at 8 am   -- Indication: For Late onset Alzheimer's disease with behavioral disturbance    QUEtiapine 25 mg oral tablet  -- 1 tab(s) by mouth once a day at 8 pm   -- Indication: For Late onset Alzheimer's disease with behavioral disturbance    busPIRone 15 mg oral tablet  -- 1 tab(s) by mouth 2 times a day at 9 am and 3 pm   -- Indication: For Delirium due to another medical condition    Toprol-XL 25 mg oral tablet, extended release  -- 1 tab(s) by mouth once a day  -- Indication: For HTN    melatonin 3 mg oral tablet  -- 1 tab(s) by mouth once a day (at bedtime)  -- Indication: For Sleep    NexIUM 20 mg oral delayed release capsule  -- 1 cap(s) by mouth once a day  -- Indication: For gerd

## 2018-01-31 NOTE — DISCHARGE NOTE ADULT - CARE PLAN
Principal Discharge DX:	Aggression  Goal:	to be safe in enviornment  Assessment and plan of treatment:	F/u Psych at the facility - Continue taking seroquel as scheduled  Secondary Diagnosis:	UTI (urinary tract infection)  Assessment and plan of treatment:	Completed the course of macrobid x 5 days

## 2018-01-31 NOTE — DISCHARGE NOTE ADULT - PATIENT PORTAL LINK FT
“You can access the Follow[facility] Patient Portal, offered by [facility], by registering with the following website: [email or web address]”

## 2018-01-31 NOTE — DISCHARGE NOTE ADULT - PLAN OF CARE
to be safe in enviornment F/u Psych at the facility - Continue taking seroquel as scheduled Completed the course of macrobid x 5 days

## 2018-01-31 NOTE — PROGRESS NOTE ADULT - SUBJECTIVE AND OBJECTIVE BOX
Patient is a 85y old  Female who presents with a chief complaint of agitation (25 Jan 2018 23:23)    SUBJECTIVE / OVERNIGHT EVENTS:  Patient with no complaints. No Nausea.     MEDICATIONS  (STANDING):  aspirin enteric coated 81 milliGRAM(s) Oral daily  busPIRone 15 milliGRAM(s) Oral <User Schedule>  dextrose 50% Injectable 12.5 Gram(s) IV Push once  dextrose 50% Injectable 25 Gram(s) IV Push once  dextrose 50% Injectable 25 Gram(s) IV Push once  heparin  Injectable 5000 Unit(s) SubCutaneous every 8 hours  insulin lispro (HumaLOG) corrective regimen sliding scale   SubCutaneous three times a day before meals  insulin lispro (HumaLOG) corrective regimen sliding scale   SubCutaneous at bedtime  loratadine 10 milliGRAM(s) Oral daily  melatonin 3 milliGRAM(s) Oral at bedtime  metoprolol succinate ER 25 milliGRAM(s) Oral daily  nitrofurantoin (MACROBID) 100 milliGRAM(s) Oral two times a day with meals  pantoprazole    Tablet 40 milliGRAM(s) Oral before breakfast  QUEtiapine 12.5 milliGRAM(s) Oral <User Schedule>  QUEtiapine 25 milliGRAM(s) Oral <User Schedule>    MEDICATIONS  (PRN):  acetaminophen   Tablet. 650 milliGRAM(s) Oral every 6 hours PRN Mild Pain (1 - 3)  dextrose Gel 1 Dose(s) Oral once PRN Blood Glucose LESS THAN 70 milliGRAM(s)/deciliter  glucagon  Injectable 1 milliGRAM(s) IntraMuscular once PRN Glucose LESS THAN 70 milligrams/deciliter    Vital Signs Last 24 Hrs  T(C): 36.5 (29 Jan 2018 13:41), Max: 36.6 (28 Jan 2018 19:08)  T(F): 97.7 (29 Jan 2018 13:41), Max: 97.9 (28 Jan 2018 19:08)  HR: 59 (29 Jan 2018 13:41) (58 - 64)  BP: 136/58 (29 Jan 2018 13:41) (123/51 - 136/58)  BP(mean): --  RR: 18 (29 Jan 2018 13:41) (18 - 18)  SpO2: 98% (29 Jan 2018 13:41) (97% - 99%)    PHYSICAL EXAM  GENERAL: Elderly woman in NAD, well-developed  HEART: Regular rate and rhythm; Grade 3/6 systolic murmur heard throughout precordium. No rubs, or gallops  ABDOMEN: Soft, Nontender, Nondistended; Bowel sounds present.    LABS:  CAPILLARY BLOOD GLUCOSE  POCT Blood Glucose.: 133 mg/dL (31 Jan 2018 11:37)  POCT Blood Glucose.: 118 mg/dL (31 Jan 2018 07:54)  POCT Blood Glucose.: 119 mg/dL (30 Jan 2018 22:30)  POCT Blood Glucose.: 126 mg/dL (30 Jan 2018 17:24)      Consultant(s) Notes Reviewed    Care Discussed with Consultants/Other Providers:

## 2018-01-31 NOTE — PROGRESS NOTE ADULT - PROBLEM SELECTOR PROBLEM 2
Abnormal urinalysis

## 2018-01-31 NOTE — PROGRESS NOTE BEHAVIORAL HEALTH - NSBHCHARTREVIEWVS_PSY_A_CORE FT
Vital Signs Last 24 Hrs  T(C): 36.5 (29 Jan 2018 13:41), Max: 36.6 (28 Jan 2018 19:08)  T(F): 97.7 (29 Jan 2018 13:41), Max: 97.9 (28 Jan 2018 19:08)  HR: 59 (29 Jan 2018 13:41) (58 - 64)  BP: 136/58 (29 Jan 2018 13:41) (121/51 - 136/58)  BP(mean): --  RR: 18 (29 Jan 2018 13:41) (18 - 18)  SpO2: 98% (29 Jan 2018 13:41) (97% - 99%)
Vital Signs Last 24 Hrs  T(C): 36.6 (31 Jan 2018 05:14), Max: 37 (30 Jan 2018 20:20)  T(F): 97.9 (31 Jan 2018 05:14), Max: 98.6 (30 Jan 2018 20:20)  HR: 59 (31 Jan 2018 05:14) (59 - 64)  BP: 149/66 (31 Jan 2018 05:14) (113/68 - 149/66)  BP(mean): --  RR: 18 (31 Jan 2018 05:14) (18 - 18)  SpO2: 99% (31 Jan 2018 05:14) (96% - 99%)

## 2018-01-31 NOTE — PROGRESS NOTE BEHAVIORAL HEALTH - AXIS III
DM, endometrial cancer in remission s/p RYAN, HLD, HTN, severe AS
DM, endometrial cancer in remission s/p RYAN, HLD, HTN, severe AS

## 2018-01-31 NOTE — PROGRESS NOTE ADULT - PROBLEM SELECTOR PROBLEM 1
Late onset Alzheimer's disease with behavioral disturbance

## 2018-01-31 NOTE — PROGRESS NOTE BEHAVIORAL HEALTH - ORIENTATION OTHER
Patient able to state name, month and that she is in a hospital. Patient unable to communicate name of hospital and reason for being here.
Patient able to state her name and that she was in the hospital (unable to name hospital), patient unable to communicate month/year/reason for being in hospital.

## 2018-01-31 NOTE — DISCHARGE NOTE ADULT - ABILITY TO HEAR (WITH HEARING AID OR HEARING APPLIANCE IF NORMALLY USED):
hearing impaired rt ear/Mildly to Moderately Impaired: difficulty hearing in some environments or speaker may need to increase volume or speak distinctly

## 2018-01-31 NOTE — PROGRESS NOTE BEHAVIORAL HEALTH - RISK ASSESSMENT
Pt is not at acute elevated risk to self or others. She has chronic risk factors including dementia, impulsive behavior that put her at a chronic elevated risk of harm to self/others. Protective factors include stability of domicile, lack of suicidality, lack of homicidality.
Pt is not at acute elevated risk to self or others. She has chronic risk factors including dementia, impulsive behavior that put her at a chronic elevated risk of harm to self/others. Protective factors include stability of domicile, lack of suicidality, lack of homicidality.

## 2018-01-31 NOTE — PROGRESS NOTE BEHAVIORAL HEALTH - SUMMARY
Pt is a 84 y/o F, domiciled with son, reported history of late onset Alzheimer's dementia with behavioral disturbances, unknown past psychiatric history, HHA during daytime, past medical history of DM, endometrial cancer in remission s/p RYAN, HLD, HTN, severe AS p/w episodes of agitation. Psychiatry called for management of agitation. Per records, patient has been hospitalized numerous times for agitation in the setting of diarrhea or UTIs. She continues to have behavioral issues at home, lashing out, with waxing and waning confusion, though no violence. Has wandering behaviors and frequent somatic complaints. She has been trialled on Aricept, which was stopped secondary to diarrhea, and Seroquel, which was D'C a few months ago for unknown reasons. Patient currently being treated with Seroquel and Buspar (see below). Per son, buspirone helps her behavior.     Today, pt currently calm and cooperative with interview. Presentation consistent with dementia with behavioral disturbances, r/out delirium secondary to general medical condition. Son (Roberto) is currently working with [employer] [facility] for placement at [facility].     Plan:   1. Titrate PM Seroquel dose to 25mg po q20:00, monitor ECG, maintain qTC < 500  2. Continue Seroquel 12.5mg po qAM, monitor ECG, maintain qTC < 500  3. Continue with home Buspar, 15mg po q9:00, q15:00  4. Patient does not require inpatient hospitalization.

## 2018-01-31 NOTE — PROGRESS NOTE ADULT - PROBLEM SELECTOR PROBLEM 4
Type 2 diabetes mellitus without complication, without long-term current use of insulin

## 2018-01-31 NOTE — PROGRESS NOTE BEHAVIORAL HEALTH - NSBHCHARTREVIEWINVESTIGATE_PSY_A_CORE FT
< from: 12 Lead ECG (01.25.18 @ 22:08) >      Ventricular Rate 54 BPM    Atrial Rate 54 BPM    P-R Interval 170 ms    QRS Duration 72 ms    Q-T Interval 468 ms    QTC Calculation(Bezet) 443 ms    P Axis -5 degrees    R Axis 12 degrees    T Axis 128 degrees    Diagnosis Line Sinus bradycardia  ST & T wave abnormality, consider lateral ischemia  Abnormal ECG    < end of copied text >
< from: 12 Lead ECG (01.25.18 @ 22:08) >      Ventricular Rate 54 BPM    Atrial Rate 54 BPM    P-R Interval 170 ms    QRS Duration 72 ms    Q-T Interval 468 ms    QTC Calculation(Bezet) 443 ms    P Axis -5 degrees    R Axis 12 degrees    T Axis 128 degrees    Diagnosis Line Sinus bradycardia  ST & T wave abnormality, consider lateral ischemia  Abnormal ECG    < end of copied text >

## 2018-01-31 NOTE — DISCHARGE NOTE ADULT - HOSPITAL COURSE
Patient is an 86 yo woman with HTN, HLd, dementia, severe AS, and DM admitted for behavioral disturbances and agitation likely due to progressive dementia. Patient is unsafe to be discharged home given family account of pt's decline in cognition.     Late onset Alzheimer's disease with behavioral disturbance  .Likely due to progressive symptoms. Patient currently calm and hemodynamically stable. Exam and labs unremarkable.   - will continue buspirone   - Psych eval appreciated  - C/w seroquel standing      Abnormal urinalysis.    Likely acute cystitis without hematuria per report by patient's son that patient experiences back pain when she gets a UTI  - Completed  Macrobid 100mg PO BID for 5 days.      Severe aortic stenosis   Patient not surgical candidate  - continue metoprolol.     Type 2 [facility] mellitus without complication  [facility] is an 86 [facility] woman with HTN, HLD, dementia, severe AS, and DM 2 admitted for behavioral disturbances and agitation likely due to progressive dementia. Patient is unsafe to be discharged home given family account of pt's decline in cognition. Hospital course complicated by UTI, treated with macrobid for 5 days. Patient evaluated by psychiatry during admission and was started on Seroquel standing. She was observed to be clinically stable and was discharged to LTC on 1/31/18.     DISCHARGE TIME- 36 MINUTES SPENT PREPARING DISCHARGE, INCLUDING PAPERWORK, MEDICATION RECONCILIATION, AND COUNSELLING OF PATIENT.

## 2018-01-31 NOTE — PROGRESS NOTE ADULT - ATTENDING COMMENTS
DISPO: Awaiting dispo to LTC facility per discussion with SW. Otherwise, patient with no acute medical issues. Patient is medically stable for discharge once facility placement is arranged.
DISPO: Awaiting dispo to LTC facility per discussion with SW. Otherwise, patient with no acute medical issues. Patient is medically stable for discharge once facility placement is arranged.    DISCHARGE TIME- 36 MINUTES SPENT PREPARING DISCHARGE, INCLUDING PAPERWORK, MEDICATION RECONCILIATION, AND COUNSELLING OF PATIENT.
DISPO: Awaiting dispo to LTC facility per discussion with PEGGY Diallo yesterday. Otherwise, patient with no acute medical issues. Patient is medically stable for discharge once facility placement is arranged.
DISPO: Awaiting dispo to LTC facility per discussion with SW. Otherwise, patient with no acute medical issues. Patient is medically stable for discharge once facility placement is arranged.    DISCHARGE TIME- 36 MINUTES SPENT PREPARING DISCHARGE, INCLUDING PAPERWORK, MEDICATION RECONCILIATION, AND COUNSELLING OF PATIENT.
Dispo- Spoke with PEGGY, pending approval to [facility]

## 2018-01-31 NOTE — PROGRESS NOTE BEHAVIORAL HEALTH - PRIMARY DX
Late onset Alzheimer's disease with behavioral disturbance
Late onset Alzheimer's disease with behavioral disturbance

## 2018-04-26 ENCOUNTER — APPOINTMENT (OUTPATIENT)
Dept: ORTHOPEDIC SURGERY | Facility: CLINIC | Age: 83
End: 2018-04-26

## 2018-04-27 ENCOUNTER — APPOINTMENT (OUTPATIENT)
Dept: ORTHOPEDIC SURGERY | Facility: CLINIC | Age: 83
End: 2018-04-27
Payer: MEDICARE

## 2018-04-27 VITALS
HEIGHT: 66 IN | SYSTOLIC BLOOD PRESSURE: 104 MMHG | DIASTOLIC BLOOD PRESSURE: 59 MMHG | HEART RATE: 51 BPM | BODY MASS INDEX: 25.71 KG/M2 | WEIGHT: 160 LBS

## 2018-04-27 PROCEDURE — 99203 OFFICE O/P NEW LOW 30 MIN: CPT

## 2018-04-27 PROCEDURE — 73130 X-RAY EXAM OF HAND: CPT | Mod: LT

## 2018-05-18 ENCOUNTER — APPOINTMENT (OUTPATIENT)
Dept: ORTHOPEDIC SURGERY | Facility: CLINIC | Age: 83
End: 2018-05-18
Payer: MEDICARE

## 2018-05-18 VITALS
HEIGHT: 66 IN | WEIGHT: 160 LBS | BODY MASS INDEX: 25.71 KG/M2 | SYSTOLIC BLOOD PRESSURE: 103 MMHG | HEART RATE: 67 BPM | DIASTOLIC BLOOD PRESSURE: 67 MMHG

## 2018-05-18 DIAGNOSIS — S62.611A DISPLACED FRACTURE OF PROXIMAL PHALANX OF LEFT INDEX FINGER, INITIAL ENCOUNTER FOR CLOSED FRACTURE: ICD-10-CM

## 2018-05-18 PROCEDURE — 73130 X-RAY EXAM OF HAND: CPT | Mod: LT

## 2018-05-18 PROCEDURE — 99213 OFFICE O/P EST LOW 20 MIN: CPT

## 2018-05-29 ENCOUNTER — INPATIENT (INPATIENT)
Facility: HOSPITAL | Age: 83
LOS: 7 days | Discharge: HOME | DRG: 871 | End: 2018-06-06
Attending: HOSPITALIST | Admitting: HOSPITALIST
Payer: MEDICARE

## 2018-05-29 VITALS
HEART RATE: 125 BPM | DIASTOLIC BLOOD PRESSURE: 66 MMHG | RESPIRATION RATE: 30 BRPM | OXYGEN SATURATION: 70 % | SYSTOLIC BLOOD PRESSURE: 118 MMHG

## 2018-05-29 DIAGNOSIS — E78.5 HYPERLIPIDEMIA, UNSPECIFIED: ICD-10-CM

## 2018-05-29 DIAGNOSIS — I35.0 NONRHEUMATIC AORTIC (VALVE) STENOSIS: ICD-10-CM

## 2018-05-29 DIAGNOSIS — R06.03 ACUTE RESPIRATORY DISTRESS: ICD-10-CM

## 2018-05-29 DIAGNOSIS — J18.9 PNEUMONIA, UNSPECIFIED ORGANISM: ICD-10-CM

## 2018-05-29 DIAGNOSIS — F03.90 UNSPECIFIED DEMENTIA WITHOUT BEHAVIORAL DISTURBANCE: ICD-10-CM

## 2018-05-29 DIAGNOSIS — I10 ESSENTIAL (PRIMARY) HYPERTENSION: ICD-10-CM

## 2018-05-29 DIAGNOSIS — A41.9 SEPSIS, UNSPECIFIED ORGANISM: ICD-10-CM

## 2018-05-29 DIAGNOSIS — Z29.9 ENCOUNTER FOR PROPHYLACTIC MEASURES, UNSPECIFIED: ICD-10-CM

## 2018-05-29 DIAGNOSIS — E11.9 TYPE 2 DIABETES MELLITUS WITHOUT COMPLICATIONS: ICD-10-CM

## 2018-05-29 LAB
ALBUMIN SERPL ELPH-MCNC: 3.3 G/DL — SIGNIFICANT CHANGE UP (ref 3.3–5)
ALP SERPL-CCNC: 129 U/L — HIGH (ref 40–120)
ALT FLD-CCNC: 34 U/L — SIGNIFICANT CHANGE UP (ref 10–45)
ANION GAP SERPL CALC-SCNC: 17 MMOL/L — SIGNIFICANT CHANGE UP (ref 5–17)
APPEARANCE UR: CLEAR — SIGNIFICANT CHANGE UP
APTT BLD: 28.3 SEC — SIGNIFICANT CHANGE UP (ref 27.5–37.4)
AST SERPL-CCNC: 29 U/L — SIGNIFICANT CHANGE UP (ref 10–40)
BASE EXCESS BLDV CALC-SCNC: -2.7 MMOL/L — LOW (ref -2–2)
BASE EXCESS BLDV CALC-SCNC: -2.9 MMOL/L — LOW (ref -2–2)
BASOPHILS # BLD AUTO: 0 K/UL — SIGNIFICANT CHANGE UP (ref 0–0.2)
BILIRUB SERPL-MCNC: 1.5 MG/DL — HIGH (ref 0.2–1.2)
BILIRUB UR-MCNC: NEGATIVE — SIGNIFICANT CHANGE UP
BUN SERPL-MCNC: 28 MG/DL — HIGH (ref 7–23)
CA-I SERPL-SCNC: 1.07 MMOL/L — LOW (ref 1.12–1.3)
CA-I SERPL-SCNC: 1.15 MMOL/L — SIGNIFICANT CHANGE UP (ref 1.12–1.3)
CALCIUM SERPL-MCNC: 8.6 MG/DL — SIGNIFICANT CHANGE UP (ref 8.4–10.5)
CHLORIDE BLDV-SCNC: 103 MMOL/L — SIGNIFICANT CHANGE UP (ref 96–108)
CHLORIDE BLDV-SCNC: 107 MMOL/L — SIGNIFICANT CHANGE UP (ref 96–108)
CHLORIDE SERPL-SCNC: 100 MMOL/L — SIGNIFICANT CHANGE UP (ref 96–108)
CO2 BLDV-SCNC: 23 MMOL/L — SIGNIFICANT CHANGE UP (ref 22–30)
CO2 BLDV-SCNC: 25 MMOL/L — SIGNIFICANT CHANGE UP (ref 22–30)
CO2 SERPL-SCNC: 20 MMOL/L — LOW (ref 22–31)
COLOR SPEC: SIGNIFICANT CHANGE UP
CREAT SERPL-MCNC: 1.07 MG/DL — SIGNIFICANT CHANGE UP (ref 0.5–1.3)
DIFF PNL FLD: NEGATIVE — SIGNIFICANT CHANGE UP
EOSINOPHIL # BLD AUTO: 0 K/UL — SIGNIFICANT CHANGE UP (ref 0–0.5)
EPI CELLS # UR: SIGNIFICANT CHANGE UP /HPF
GAS PNL BLDV: 133 MMOL/L — LOW (ref 136–145)
GAS PNL BLDV: 134 MMOL/L — LOW (ref 136–145)
GAS PNL BLDV: SIGNIFICANT CHANGE UP
GLUCOSE BLDC GLUCOMTR-MCNC: 174 MG/DL — HIGH (ref 70–99)
GLUCOSE BLDV-MCNC: 173 MG/DL — HIGH (ref 70–99)
GLUCOSE BLDV-MCNC: 188 MG/DL — HIGH (ref 70–99)
GLUCOSE SERPL-MCNC: 191 MG/DL — HIGH (ref 70–99)
GLUCOSE UR QL: NEGATIVE — SIGNIFICANT CHANGE UP
HCO3 BLDV-SCNC: 22 MMOL/L — SIGNIFICANT CHANGE UP (ref 21–29)
HCO3 BLDV-SCNC: 23 MMOL/L — SIGNIFICANT CHANGE UP (ref 21–29)
HCT VFR BLD CALC: 37.5 % — SIGNIFICANT CHANGE UP (ref 34.5–45)
HCT VFR BLDA CALC: 39 % — SIGNIFICANT CHANGE UP (ref 39–50)
HCT VFR BLDA CALC: 41 % — SIGNIFICANT CHANGE UP (ref 39–50)
HGB BLD CALC-MCNC: 12.6 G/DL — SIGNIFICANT CHANGE UP (ref 11.5–15.5)
HGB BLD CALC-MCNC: 13.2 G/DL — SIGNIFICANT CHANGE UP (ref 11.5–15.5)
HGB BLD-MCNC: 12.9 G/DL — SIGNIFICANT CHANGE UP (ref 11.5–15.5)
INR BLD: 1.26 RATIO — HIGH (ref 0.88–1.16)
KETONES UR-MCNC: NEGATIVE — SIGNIFICANT CHANGE UP
LACTATE BLDV-MCNC: 4 MMOL/L — CRITICAL HIGH (ref 0.7–2)
LACTATE BLDV-MCNC: 4 MMOL/L — CRITICAL HIGH (ref 0.7–2)
LEUKOCYTE ESTERASE UR-ACNC: ABNORMAL
LG PLATELETS BLD QL AUTO: SLIGHT — SIGNIFICANT CHANGE UP
LYMPHOCYTES # BLD AUTO: 0.4 K/UL — LOW (ref 1–3.3)
LYMPHOCYTES # BLD AUTO: 1 % — LOW (ref 13–44)
MCHC RBC-ENTMCNC: 31.8 PG — SIGNIFICANT CHANGE UP (ref 27–34)
MCHC RBC-ENTMCNC: 34.3 GM/DL — SIGNIFICANT CHANGE UP (ref 32–36)
MCV RBC AUTO: 92.6 FL — SIGNIFICANT CHANGE UP (ref 80–100)
MONOCYTES # BLD AUTO: 1.1 K/UL — HIGH (ref 0–0.9)
MONOCYTES NFR BLD AUTO: 6 % — SIGNIFICANT CHANGE UP (ref 2–14)
NEUTROPHILS # BLD AUTO: 17.5 K/UL — HIGH (ref 1.8–7.4)
NEUTROPHILS NFR BLD AUTO: 75 % — SIGNIFICANT CHANGE UP (ref 43–77)
NEUTS BAND # BLD: 18 % — HIGH (ref 0–8)
NITRITE UR-MCNC: NEGATIVE — SIGNIFICANT CHANGE UP
PCO2 BLDV: 38 MMHG — SIGNIFICANT CHANGE UP (ref 35–50)
PCO2 BLDV: 47 MMHG — SIGNIFICANT CHANGE UP (ref 35–50)
PH BLDV: 7.31 — LOW (ref 7.35–7.45)
PH BLDV: 7.38 — SIGNIFICANT CHANGE UP (ref 7.35–7.45)
PH UR: 6 — SIGNIFICANT CHANGE UP (ref 5–8)
PLAT MORPH BLD: NORMAL — SIGNIFICANT CHANGE UP
PLATELET # BLD AUTO: 217 K/UL — SIGNIFICANT CHANGE UP (ref 150–400)
PO2 BLDV: 26 MMHG — SIGNIFICANT CHANGE UP (ref 25–45)
PO2 BLDV: 58 MMHG — HIGH (ref 25–45)
POTASSIUM BLDV-SCNC: 3.7 MMOL/L — SIGNIFICANT CHANGE UP (ref 3.5–5)
POTASSIUM BLDV-SCNC: 4.3 MMOL/L — SIGNIFICANT CHANGE UP (ref 3.5–5)
POTASSIUM SERPL-MCNC: 3.9 MMOL/L — SIGNIFICANT CHANGE UP (ref 3.5–5.3)
POTASSIUM SERPL-SCNC: 3.9 MMOL/L — SIGNIFICANT CHANGE UP (ref 3.5–5.3)
PROT SERPL-MCNC: 6.6 G/DL — SIGNIFICANT CHANGE UP (ref 6–8.3)
PROT UR-MCNC: SIGNIFICANT CHANGE UP
PROTHROM AB SERPL-ACNC: 13.7 SEC — HIGH (ref 9.8–12.7)
RAPID RVP RESULT: SIGNIFICANT CHANGE UP
RBC # BLD: 4.05 M/UL — SIGNIFICANT CHANGE UP (ref 3.8–5.2)
RBC # FLD: 12.4 % — SIGNIFICANT CHANGE UP (ref 10.3–14.5)
RBC BLD AUTO: SIGNIFICANT CHANGE UP
RBC CASTS # UR COMP ASSIST: SIGNIFICANT CHANGE UP /HPF (ref 0–2)
SAO2 % BLDV: 40 % — LOW (ref 67–88)
SAO2 % BLDV: 86 % — SIGNIFICANT CHANGE UP (ref 67–88)
SODIUM SERPL-SCNC: 137 MMOL/L — SIGNIFICANT CHANGE UP (ref 135–145)
SP GR SPEC: 1.01 — LOW (ref 1.01–1.02)
UROBILINOGEN FLD QL: NEGATIVE — SIGNIFICANT CHANGE UP
WBC # BLD: 19.1 K/UL — HIGH (ref 3.8–10.5)
WBC # FLD AUTO: 19.1 K/UL — HIGH (ref 3.8–10.5)
WBC UR QL: SIGNIFICANT CHANGE UP /HPF (ref 0–5)

## 2018-05-29 PROCEDURE — 99291 CRITICAL CARE FIRST HOUR: CPT

## 2018-05-29 PROCEDURE — 99223 1ST HOSP IP/OBS HIGH 75: CPT | Mod: GC

## 2018-05-29 PROCEDURE — 71045 X-RAY EXAM CHEST 1 VIEW: CPT | Mod: 26

## 2018-05-29 RX ORDER — ASPIRIN/CALCIUM CARB/MAGNESIUM 324 MG
81 TABLET ORAL DAILY
Qty: 0 | Refills: 0 | Status: DISCONTINUED | OUTPATIENT
Start: 2018-05-29 | End: 2018-05-29

## 2018-05-29 RX ORDER — PREGABALIN 225 MG/1
1 CAPSULE ORAL
Qty: 0 | Refills: 0 | COMMUNITY

## 2018-05-29 RX ORDER — SERTRALINE 25 MG/1
12.5 TABLET, FILM COATED ORAL DAILY
Qty: 0 | Refills: 0 | Status: DISCONTINUED | OUTPATIENT
Start: 2018-05-29 | End: 2018-06-06

## 2018-05-29 RX ORDER — GLUCAGON INJECTION, SOLUTION 0.5 MG/.1ML
1 INJECTION, SOLUTION SUBCUTANEOUS ONCE
Qty: 0 | Refills: 0 | Status: DISCONTINUED | OUTPATIENT
Start: 2018-05-29 | End: 2018-05-31

## 2018-05-29 RX ORDER — DOCUSATE SODIUM 100 MG
100 CAPSULE ORAL
Qty: 0 | Refills: 0 | Status: DISCONTINUED | OUTPATIENT
Start: 2018-05-29 | End: 2018-06-06

## 2018-05-29 RX ORDER — LANOLIN ALCOHOL/MO/W.PET/CERES
3 CREAM (GRAM) TOPICAL AT BEDTIME
Qty: 0 | Refills: 0 | Status: DISCONTINUED | OUTPATIENT
Start: 2018-05-29 | End: 2018-06-06

## 2018-05-29 RX ORDER — SENNA PLUS 8.6 MG/1
2 TABLET ORAL AT BEDTIME
Qty: 0 | Refills: 0 | Status: CANCELLED | OUTPATIENT
Start: 2019-04-28 | End: 2018-06-06

## 2018-05-29 RX ORDER — DEXTROSE 50 % IN WATER 50 %
25 SYRINGE (ML) INTRAVENOUS ONCE
Qty: 0 | Refills: 0 | Status: DISCONTINUED | OUTPATIENT
Start: 2018-05-29 | End: 2018-05-31

## 2018-05-29 RX ORDER — CETIRIZINE HYDROCHLORIDE 10 MG/1
1 TABLET ORAL
Qty: 0 | Refills: 0 | COMMUNITY

## 2018-05-29 RX ORDER — HEPARIN SODIUM 5000 [USP'U]/ML
5000 INJECTION INTRAVENOUS; SUBCUTANEOUS EVERY 8 HOURS
Qty: 0 | Refills: 0 | Status: DISCONTINUED | OUTPATIENT
Start: 2018-05-29 | End: 2018-06-06

## 2018-05-29 RX ORDER — INSULIN LISPRO 100/ML
VIAL (ML) SUBCUTANEOUS EVERY 6 HOURS
Qty: 0 | Refills: 0 | Status: DISCONTINUED | OUTPATIENT
Start: 2018-05-29 | End: 2018-05-31

## 2018-05-29 RX ORDER — ASPIRIN/CALCIUM CARB/MAGNESIUM 324 MG
81 TABLET ORAL DAILY
Qty: 0 | Refills: 0 | Status: DISCONTINUED | OUTPATIENT
Start: 2018-05-29 | End: 2018-06-06

## 2018-05-29 RX ORDER — SODIUM CHLORIDE 9 MG/ML
500 INJECTION, SOLUTION INTRAVENOUS ONCE
Qty: 0 | Refills: 0 | Status: COMPLETED | OUTPATIENT
Start: 2018-05-29 | End: 2018-05-29

## 2018-05-29 RX ORDER — ACETAMINOPHEN 500 MG
650 TABLET ORAL ONCE
Qty: 0 | Refills: 0 | Status: COMPLETED | OUTPATIENT
Start: 2018-05-29 | End: 2018-05-29

## 2018-05-29 RX ORDER — ASPIRIN/CALCIUM CARB/MAGNESIUM 324 MG
1 TABLET ORAL
Qty: 0 | Refills: 0 | COMMUNITY

## 2018-05-29 RX ORDER — GABAPENTIN 400 MG/1
0 CAPSULE ORAL
Qty: 0 | Refills: 0 | COMMUNITY

## 2018-05-29 RX ORDER — OMEPRAZOLE 10 MG/1
1 CAPSULE, DELAYED RELEASE ORAL
Qty: 0 | Refills: 0 | COMMUNITY

## 2018-05-29 RX ORDER — PREGABALIN 225 MG/1
100 CAPSULE ORAL DAILY
Qty: 0 | Refills: 0 | Status: DISCONTINUED | OUTPATIENT
Start: 2018-05-29 | End: 2018-05-29

## 2018-05-29 RX ORDER — PIPERACILLIN AND TAZOBACTAM 4; .5 G/20ML; G/20ML
3.38 INJECTION, POWDER, LYOPHILIZED, FOR SOLUTION INTRAVENOUS ONCE
Qty: 0 | Refills: 0 | Status: COMPLETED | OUTPATIENT
Start: 2018-05-29 | End: 2018-05-29

## 2018-05-29 RX ORDER — PREGABALIN 225 MG/1
100 CAPSULE ORAL DAILY
Qty: 0 | Refills: 0 | Status: DISCONTINUED | OUTPATIENT
Start: 2018-05-29 | End: 2018-06-06

## 2018-05-29 RX ORDER — SODIUM CHLORIDE 9 MG/ML
1000 INJECTION, SOLUTION INTRAVENOUS
Qty: 0 | Refills: 0 | Status: DISCONTINUED | OUTPATIENT
Start: 2018-05-29 | End: 2018-05-31

## 2018-05-29 RX ORDER — ACETAMINOPHEN 500 MG
650 TABLET ORAL
Qty: 0 | Refills: 0 | COMMUNITY

## 2018-05-29 RX ORDER — PANTOPRAZOLE SODIUM 20 MG/1
40 TABLET, DELAYED RELEASE ORAL DAILY
Qty: 0 | Refills: 0 | Status: DISCONTINUED | OUTPATIENT
Start: 2018-05-29 | End: 2018-06-01

## 2018-05-29 RX ORDER — GABAPENTIN 400 MG/1
1 CAPSULE ORAL
Qty: 0 | Refills: 0 | COMMUNITY

## 2018-05-29 RX ORDER — SODIUM CHLORIDE 9 MG/ML
2000 INJECTION, SOLUTION INTRAVENOUS ONCE
Qty: 0 | Refills: 0 | Status: COMPLETED | OUTPATIENT
Start: 2018-05-29 | End: 2018-05-29

## 2018-05-29 RX ORDER — IPRATROPIUM/ALBUTEROL SULFATE 18-103MCG
3 AEROSOL WITH ADAPTER (GRAM) INHALATION
Qty: 0 | Refills: 0 | COMMUNITY

## 2018-05-29 RX ORDER — METOPROLOL TARTRATE 50 MG
1 TABLET ORAL
Qty: 0 | Refills: 0 | COMMUNITY

## 2018-05-29 RX ORDER — DOCUSATE SODIUM 100 MG
100 CAPSULE ORAL
Qty: 0 | Refills: 0 | Status: DISCONTINUED | OUTPATIENT
Start: 2018-05-29 | End: 2018-05-29

## 2018-05-29 RX ORDER — ESOMEPRAZOLE MAGNESIUM 40 MG/1
1 CAPSULE, DELAYED RELEASE ORAL
Qty: 0 | Refills: 0 | COMMUNITY

## 2018-05-29 RX ORDER — VANCOMYCIN HCL 1 G
1000 VIAL (EA) INTRAVENOUS EVERY 24 HOURS
Qty: 0 | Refills: 0 | Status: DISCONTINUED | OUTPATIENT
Start: 2018-05-29 | End: 2018-05-30

## 2018-05-29 RX ORDER — PIPERACILLIN AND TAZOBACTAM 4; .5 G/20ML; G/20ML
3.38 INJECTION, POWDER, LYOPHILIZED, FOR SOLUTION INTRAVENOUS EVERY 8 HOURS
Qty: 0 | Refills: 0 | Status: DISCONTINUED | OUTPATIENT
Start: 2018-05-29 | End: 2018-05-30

## 2018-05-29 RX ORDER — DEXTROSE 50 % IN WATER 50 %
15 SYRINGE (ML) INTRAVENOUS ONCE
Qty: 0 | Refills: 0 | Status: DISCONTINUED | OUTPATIENT
Start: 2018-05-29 | End: 2018-05-31

## 2018-05-29 RX ORDER — SENNA PLUS 8.6 MG/1
2 TABLET ORAL AT BEDTIME
Qty: 0 | Refills: 0 | Status: DISCONTINUED | OUTPATIENT
Start: 2018-05-29 | End: 2018-06-06

## 2018-05-29 RX ORDER — PANTOPRAZOLE SODIUM 20 MG/1
40 TABLET, DELAYED RELEASE ORAL
Qty: 0 | Refills: 0 | Status: DISCONTINUED | OUTPATIENT
Start: 2018-05-29 | End: 2018-05-29

## 2018-05-29 RX ORDER — DEXTROSE 50 % IN WATER 50 %
12.5 SYRINGE (ML) INTRAVENOUS ONCE
Qty: 0 | Refills: 0 | Status: DISCONTINUED | OUTPATIENT
Start: 2018-05-29 | End: 2018-05-31

## 2018-05-29 RX ORDER — SENNA PLUS 8.6 MG/1
2 TABLET ORAL
Qty: 0 | Refills: 0 | COMMUNITY

## 2018-05-29 RX ADMIN — SODIUM CHLORIDE 500 MILLILITER(S): 9 INJECTION, SOLUTION INTRAVENOUS at 14:10

## 2018-05-29 RX ADMIN — SERTRALINE 12.5 MILLIGRAM(S): 25 TABLET, FILM COATED ORAL at 23:03

## 2018-05-29 RX ADMIN — Medication 250 MILLIGRAM(S): at 22:58

## 2018-05-29 RX ADMIN — HEPARIN SODIUM 5000 UNIT(S): 5000 INJECTION INTRAVENOUS; SUBCUTANEOUS at 22:58

## 2018-05-29 RX ADMIN — PIPERACILLIN AND TAZOBACTAM 200 GRAM(S): 4; .5 INJECTION, POWDER, LYOPHILIZED, FOR SOLUTION INTRAVENOUS at 21:12

## 2018-05-29 RX ADMIN — SENNA PLUS 2 TABLET(S): 8.6 TABLET ORAL at 23:03

## 2018-05-29 RX ADMIN — Medication 650 MILLIGRAM(S): at 17:14

## 2018-05-29 NOTE — CONSULT NOTE ADULT - SUBJECTIVE AND OBJECTIVE BOX
Patient seen and evaluated @   Chief Complaint:     HPI:    PMH:   Obesity  Arthropathy left lower leg  Lumbar Spinal Stenosis  History of Hemorrhoidectomy  S/P Excision of Acoustic Neuroma  Benign Heart Murmur  Cardiac Murmur  Closed Fracture Ankle, Bimalleolar  Vitreous Detachment  Cerebrospinal Meningitis  Renal Calculus  CA - Cancer of Uterus  DD (Diverticular Disease)  Osteoarthritis of Lumbar Spine  Hyperlipemia  Diabetes Mellitus Type II    PSH:   Arthroplasty of the Knee  Maxillary Sinus Polyp  S/P Cataract Surgery  Hammertoe  S/P Laminectomy  S/P TKR (Total Knee Replacement)  Carpal Tunnel Syndrome  Acute Lumbar Back Pain  History of Arthroscopy  Arthroplasty of the Knee  History of Laminectomy  Arthroplasty of the Knee  History of Arthroscopy of Knee  S/P Total Hysterectomy  Abnormal Uterine Bleeding  Contracted, Tendon Sheath  S/P Excision of Acoustic Neuroma  History of Tonsillectomy    Medications:   acetaminophen  Suppository 650 milliGRAM(s) Rectal once  lactated ringers Bolus 500 milliLiter(s) IV Bolus once    Allergies:  sulfa  patient stated rash (Unknown)  sulfa drugs (Unknown)    FAMILY HISTORY:  No pertinent family history in first degree relatives    Social History:  Smoking:  Alcohol:  Drugs:    Review of Systems:  Constitutional: [ ] Fever [ ] Chills [ ] Fatigue [ ] Weight change   HEENT: [ ] Blurred vision [ ] Eye Pain [ ] Headache [ ] Runny nose [ ] Sore Throat   Respiratory: [ ] Cough [ ] Wheezing [ ] Shortness of breath  Cardiovascular: [ ] Chest Pain [ ] Palpitations [ ] ROMERO [ ] PND [ ] Orthopnea  Gastrointestinal: [ ] Abdominal Pain [ ] Diarrhea [ ] Constipation [ ] Hemorrhoids [ ] Nausea [ ] Vomiting  Genitourinary: [ ] Nocturia [ ] Dysuria [ ] Incontinence  Extremities: [ ] Swelling [ ] Joint Pain  Neurologic: [ ] Focal deficit [ ] Paresthesias [ ] Syncope  Lymphatic: [ ] Swelling [ ] Lymphadenopathy   Skin: [ ] Rash [ ] Ecchymoses [ ] Wounds [ ] Lesions  Psychiatry: [ ] Depression [ ] Suicidal/Homicidal Ideation [ ] Anxiety [ ] Sleep Disturbances  [ ] 10 point review of systems is otherwise negative except as mentioned above            [ ]Unable to obtain    Physical Exam:  T(C): 40 (05-29-18 @ 14:20), Max: 40 (05-29-18 @ 14:20)  HR: 107 (05-29-18 @ 14:20) (103 - 125)  BP: 82/46 (05-29-18 @ 14:20) (82/46 - 118/66)  RR: 26 (05-29-18 @ 14:20) (26 - 30)  SpO2: 100% (05-29-18 @ 14:20) (70% - 100%)  Wt(kg): --    Daily     Daily     Appearance: NAD  Eyes: PERRL, EOMI  HENT: Normal oral muscosa, NC/AT  Cardiovascular: normal S1 and S2, RRR, no m/r/g, no edema, normal JVP  Procedural Access Site:  No hematoma, non-tender to palpation, 2+ pulses distally, no bruit, no ecchymosis  Respiratory: Clear to auscultation bilaterally  Gastrointestinal: Soft, non-tender, non-distended, BS+  Musculoskeletal: No clubbing, no joint deformity   Neurologic: Non-focal  Lymphatic: No lymphadenopathy  Psychiatry: AAOx3, mood & affect appropriate  Skin: No rashes, no ecchymoses, no cyanosis    Cardiovascular Diagnostic Testing:  ECG:    Echo:    Stress Testing:    Cath:    Interpretation of Telemetry:    Imaging:    Labs:                        12.9   19.1  )-----------( 217      ( 29 May 2018 14:13 )             37.5     05-29    137  |  100  |  28<H>  ----------------------------<  191<H>  3.9   |  20<L>  |  1.07    Ca    8.6      29 May 2018 14:13    TPro  6.6  /  Alb  3.3  /  TBili  1.5<H>  /  DBili  x   /  AST  29  /  ALT  34  /  AlkPhos  129<H>  05-29    PT/INR - ( 29 May 2018 14:13 )   PT: 13.7 sec;   INR: 1.26 ratio         PTT - ( 29 May 2018 14:13 )  PTT:28.3 sec  CARDIAC MARKERS ( 29 May 2018 14:13 )  x     / 0.12 ng/mL / x     / x     / x Patient seen and evaluated @ 3:00 PM  Chief Complaint: Fever    HPI:  85F with dementia, severe AS, DM, HTN, HLD living in a nursing home presents with fever and respiratory distress. Patient in [facility], this morning febrile and tachypneic. Sent to ED.    No chest pain. No nausea, vomiting, abd pain, diarrhea.    Placed on BiPAP. Vanc/zosyn given. Febrile in ED.    PMH:   Obesity  Arthropathy left lower leg  Lumbar Spinal Stenosis  History of Hemorrhoidectomy  S/P Excision of Acoustic Neuroma  Benign Heart Murmur  Cardiac Murmur  Closed Fracture Ankle, Bimalleolar  Vitreous Detachment  Cerebrospinal Meningitis  Renal Calculus  CA - Cancer of Uterus  DD (Diverticular Disease)  Osteoarthritis of Lumbar Spine  Hyperlipemia  Diabetes Mellitus Type II    PSH:   Arthroplasty of the Knee  Maxillary Sinus Polyp  S/P Cataract Surgery  Hammertoe  S/P Laminectomy  S/P TKR (Total Knee Replacement)  Carpal Tunnel Syndrome  Acute Lumbar Back Pain  History of Arthroscopy  Arthroplasty of the Knee  History of Laminectomy  Arthroplasty of the Knee  History of Arthroscopy of Knee  S/P Total Hysterectomy  Abnormal Uterine Bleeding  Contracted, Tendon Sheath  S/P Excision of Acoustic Neuroma  History of Tonsillectomy    Medications:   acetaminophen  Suppository 650 milliGRAM(s) Rectal once  lactated ringers Bolus 500 milliLiter(s) IV Bolus once    Allergies:  sulfa  patient stated rash (Unknown)  sulfa drugs (Unknown)    FAMILY HISTORY:  No pertinent family history in first degree relatives    Social History:  [place]    Review of Systems:  Constitutional: [ ] Fever [ ] Chills [ ] Fatigue [ ] Weight change   HEENT: [ ] Blurred vision [ ] Eye Pain [ ] Headache [ ] Runny nose [ ] Sore Throat   Respiratory: [ ] Cough [ ] Wheezing [ ] Shortness of breath  Cardiovascular: [ ] Chest Pain [ ] Palpitations [ ] ROMERO [ ] PND [ ] Orthopnea  Gastrointestinal: [ ] Abdominal Pain [ ] Diarrhea [ ] Constipation [ ] Hemorrhoids [ ] Nausea [ ] Vomiting  Genitourinary: [ ] Nocturia [ ] Dysuria [ ] Incontinence  Extremities: [ ] Swelling [ ] Joint Pain  Neurologic: [ ] Focal deficit [ ] Paresthesias [ ] Syncope  Lymphatic: [ ] Swelling [ ] Lymphadenopathy   Skin: [ ] Rash [ ] Ecchymoses [ ] Wounds [ ] Lesions  Psychiatry: [ ] Depression [ ] Suicidal/Homicidal Ideation [ ] Anxiety [ ] Sleep Disturbances  [x] 10 point review of systems is otherwise negative except as mentioned above            [ ]Unable to obtain    Physical Exam:  T(C): 40 (05-29-18 @ 14:20), Max: 40 (05-29-18 @ 14:20)  HR: 107 (05-29-18 @ 14:20) (103 - 125)  BP: 82/46 (05-29-18 @ 14:20) (82/46 - 118/66)  RR: 26 (05-29-18 @ 14:20) (26 - 30)  SpO2: 100% (05-29-18 @ 14:20) (70% - 100%)  Wt(kg): --    Daily     Daily     Appearance: NAD  Eyes: PERRL, EOMI  HENT: Normal oral muscosa, NC/AT  Cardiovascular: normal S1 and absent S2, rrr, crescendo decrescendo murmur early peaking loudest at apex, no edema, normal JVP  Respiratory: Clear to auscultation bilaterally  Gastrointestinal: Soft, non-tender, non-distended, BS+  Musculoskeletal: No clubbing, no joint deformity   Neurologic: Non-focal  Lymphatic: No lymphadenopathy  Psychiatry: AAOx3, mood & affect appropriate  Skin: No rashes, no ecchymoses, no cyanosis    Cardiovascular Diagnostic Testing:  ECG: sinus with nonspecific ST changes    Echo:  2012  CONCLUSIONS:  1. The aortic valve is poorly visualized. Peak transaortic  valve gradient equals 26 mm Hg, mean transaortic valve  gradient equals 12 mm Hg, consistent with mild aortic  stenosis.  2. Hyperdynamic and underfilled  left ventricle.   Moderate  left ventricular outflow tract gradient: 40 mm Hg, without  evidence of obstruction due to hyperdynamic LV function.    Stress Testing:  none    Cath:  none    Interpretation of Telemetry: none    Imaging:  CXR  The heart is normal in size. Right pleural effusion. Right lung   pneumonia. The left lung appears to be clear. Degenerative changes of the   thoracic spine.    Labs:                        12.9   19.1  )-----------( 217      ( 29 May 2018 14:13 )             37.5     05-29    137  |  100  |  28<H>  ----------------------------<  191<H>  3.9   |  20<L>  |  1.07    Ca    8.6      29 May 2018 14:13    TPro  6.6  /  Alb  3.3  /  TBili  1.5<H>  /  DBili  x   /  AST  29  /  ALT  34  /  AlkPhos  129<H>  05-29    PT/INR - ( 29 May 2018 14:13 )   PT: 13.7 sec;   INR: 1.26 ratio         PTT - ( 29 May 2018 14:13 )  PTT:28.3 sec  CARDIAC MARKERS ( 29 May 2018 14:13 )  x     / 0.12 ng/mL / x     / x     / x Patient seen and evaluated @ 3:00 PM  Chief Complaint: Fever    HPI:  85F with dementia, severe AS, DM, HTN, HLD living in a nursing home presents with fever and respiratory distress. Patient in [facility], this morning febrile and tachypneic. Sent to ED.    No chest pain. No nausea, vomiting, abd pain, diarrhea.    Placed on BiPAP. Vanc/zosyn given. Febrile in ED.    PMH:   Obesity  Arthropathy left lower leg  Lumbar Spinal Stenosis  History of Hemorrhoidectomy  S/P Excision of Acoustic Neuroma  Benign Heart Murmur  Cardiac Murmur  Closed Fracture Ankle, Bimalleolar  Vitreous Detachment  Cerebrospinal Meningitis  Renal Calculus  CA - Cancer of Uterus  DD (Diverticular Disease)  Osteoarthritis of Lumbar Spine  Hyperlipemia  Diabetes Mellitus Type II    PSH:   Arthroplasty of the Knee  Maxillary Sinus Polyp  S/P Cataract Surgery  Hammertoe  S/P Laminectomy  S/P TKR (Total Knee Replacement)  Carpal Tunnel Syndrome  Acute Lumbar Back Pain  History of Arthroscopy  Arthroplasty of the Knee  History of Laminectomy  Arthroplasty of the Knee  History of Arthroscopy of Knee  S/P Total Hysterectomy  Abnormal Uterine Bleeding  Contracted, Tendon Sheath  S/P Excision of Acoustic Neuroma  History of Tonsillectomy    Medications:   acetaminophen  Suppository 650 milliGRAM(s) Rectal once  lactated ringers Bolus 500 milliLiter(s) IV Bolus once    Allergies:  sulfa  patient stated rash (Unknown)  sulfa drugs (Unknown)    FAMILY HISTORY:  No pertinent family history in first degree relatives    Social History:  NC    Review of Systems:  Constitutional: [ ] Fever [ ] Chills [ ] Fatigue [ ] Weight change   HEENT: [ ] Blurred vision [ ] Eye Pain [ ] Headache [ ] Runny nose [ ] Sore Throat   Respiratory: [ ] Cough [ ] Wheezing [ ] Shortness of breath  Cardiovascular: [ ] Chest Pain [ ] Palpitations [ ] ROMERO [ ] PND [ ] Orthopnea  Gastrointestinal: [ ] Abdominal Pain [ ] Diarrhea [ ] Constipation [ ] Hemorrhoids [ ] Nausea [ ] Vomiting  Genitourinary: [ ] Nocturia [ ] Dysuria [ ] Incontinence  Extremities: [ ] Swelling [ ] Joint Pain  Neurologic: [ ] Focal deficit [ ] Paresthesias [ ] Syncope  Lymphatic: [ ] Swelling [ ] Lymphadenopathy   Skin: [ ] Rash [ ] Ecchymoses [ ] Wounds [ ] Lesions  Psychiatry: [ ] Depression [ ] Suicidal/Homicidal Ideation [ ] Anxiety [ ] Sleep Disturbances  [x] 10 point review of systems is otherwise negative except as mentioned above            [ ]Unable to obtain    Physical Exam:  T(C): 40 (05-29-18 @ 14:20), Max: 40 (05-29-18 @ 14:20)  HR: 107 (05-29-18 @ 14:20) (103 - 125)  BP: 82/46 (05-29-18 @ 14:20) (82/46 - 118/66)  RR: 26 (05-29-18 @ 14:20) (26 - 30)  SpO2: 100% (05-29-18 @ 14:20) (70% - 100%)  Wt(kg): --    Daily     Daily     Appearance: NAD  Eyes: PERRL, EOMI  HENT: Normal oral muscosa, NC/AT  Cardiovascular: normal S1 and absent S2, rrr, III/VI crescendo decrescendo murmur mid peaking at aortic area radiating to carotids and subclavians.  Similar mixed frequency/yosef mat apex, no edema, normal JVP  Respiratory: Clear to auscultation bilaterally  Gastrointestinal: Soft, non-tender, non-distended, BS+  Musculoskeletal: No clubbing, no joint deformity   Neurologic: Non-focal  Lymphatic: No lymphadenopathy  Psychiatry: AAOx3, mood & affect appropriate  Skin: No rashes, no ecchymoses, no cyanosis    Cardiovascular Diagnostic Testing:  ECG: sinus with nonspecific ST changes    Echo:  2012  CONCLUSIONS:  1. The aortic valve is poorly visualized. Peak transaortic  valve gradient equals 26 mm Hg, mean transaortic valve  gradient equals 12 mm Hg, consistent with mild aortic  stenosis.  2. Hyperdynamic and underfilled  left ventricle.   Moderate  left ventricular outflow tract gradient: 40 mm Hg, without  evidence of obstruction due to hyperdynamic LV function.    Stress Testing:  none    Cath:  none    Interpretation of Telemetry: none    Imaging:  CXR  The heart is normal in size. Right pleural effusion. Right lung   pneumonia. The left lung appears to be clear. Degenerative changes of the   thoracic spine.    Labs:                        12.9   19.1  )-----------( 217      ( 29 May 2018 14:13 )             37.5     05-29    137  |  100  |  28<H>  ----------------------------<  191<H>  3.9   |  20<L>  |  1.07    Ca    8.6      29 May 2018 14:13    TPro  6.6  /  Alb  3.3  /  TBili  1.5<H>  /  DBili  x   /  AST  29  /  ALT  34  /  AlkPhos  129<H>  05-29    PT/INR - ( 29 May 2018 14:13 )   PT: 13.7 sec;   INR: 1.26 ratio         PTT - ( 29 May 2018 14:13 )  PTT:28.3 sec  CARDIAC MARKERS ( 29 May 2018 14:13 )  x     / 0.12 ng/mL / x     / x     / x

## 2018-05-29 NOTE — H&P ADULT - PROBLEM SELECTOR PLAN 7
DVT ppx: HSQ  Diet: NPO while lethargic and BIPAP  Dispo; likely back to NH -hold IVF for now due to severe AS Lives at nursing home [facility] 1/2018. Currently lethargic, oriented x 2. QTc 471 (5/29)  -Will obtain baseline mental status from nursing home tmrw  -While lethargic and BIPAP, will hold quetiapine and buspar

## 2018-05-29 NOTE — H&P ADULT - HISTORY OF PRESENT ILLNESS
88yo woman with hx dementia, severe AS, HTN, HLD, DM2 presenting from nursing home with T103. At nursing home prior to arrival, received CTX, vancomycin, and zosyn. 86yo woman with hx dementia, severe AS, HTN, HLD, DM2 presenting from nursing home with T103. At nursing home shortly prior to departing, received CTX 1gm, vancomycin 1gm and zosyn 3.375mg. Pt developed dyspnea and was taken to the ED after son rescinded her Do Not Hospitalize order. EMS placed her on CPAP. She is still DNR/DNI. Pt does not know why she is in the hospital. Says she lives with her son at home. She endorses cough and sore throat. Denies pain, abd pain, CP, N/V/D, rash, headache, dysuria. Per ED provider note, pt was speaking in 2 word sentences and dyspneic. Her son was in the ED, but was not present at time of interview. Per nursing home documentation, pt eats a regular diet and does not require help eating at baseline. Pt had a recent admission to [facility] 1/25 - 1/31/2018 for behavioral disturbance deemed likely due to progression of dementia c/b UTI that admission treated with Macrobid 5-day course. Psychiatry saw her that admission and initiated Seroquel and she was discharged to the nursing home.    ED vitals: T104 rectal, , BP 78/49 improved to 103/57, RR 30, sat 70% on RA, improved to 98% on BIPAP. She received tylenol PO, 500cc LR as she had just received abx at the nursing home. 86yo woman with hx dementia, severe AS, HTN, HLD, DM2 presenting from nursing home with T103. At nursing home shortly prior to departing, received CTX 1gm, vancomycin 1gm and zosyn 3.375mg. Pt developed dyspnea and was taken to the ED after son rescinded her Do Not Hospitalize order. EMS placed her on CPAP. She is still DNR/DNI. Pt does not know why she is in the hospital. Says she lives with her son at home. She endorses cough and sore throat. Denies pain, abd pain, CP, N/V/D, rash, headache, dysuria. Per ED provider note, pt was speaking in 2 word sentences and dyspneic. Her son was in the ED, but was not present at time of interview. Per nursing home documentation, pt eats a regular diet and does not require help eating at baseline. Pt had a recent admission to [facility] 1/25 - 1/31/2018 for behavioral disturbance deemed likely due to progression of dementia c/b UTI that admission treated with Macrobid 5-day course. Psychiatry saw her that admission and initiated Seroquel and she was discharged to the nursing home.    ED vitals: T104 rectal, , BP 78/49 improved to 103/57, RR 30, sat 70% on RA, improved to 98% on BIPAP. She received tylenol PO, 500cc LR as she had just received abx at the nursing home.  ROS negative on interview in ED- pt poor historian but will follow simple commands and will awaken to voice command/touch.

## 2018-05-29 NOTE — ED ADULT NURSE NOTE - CHPI ED SYMPTOMS NEG
no diaphoresis/no edema/no chills/no headache/no hemoptysis/no wheezing/no chest pain/no cough/no body aches

## 2018-05-29 NOTE — H&P ADULT - NSHPREVIEWOFSYSTEMS_GEN_ALL_CORE
CONSTITUTIONAL: see HPI  EYES/ENT: No visual changes;  No dysphagia  NECK: No pain or stiffness  RESPIRATORY: see HPI  CARDIOVASCULAR: No chest pain or palpitations; No lower extremity edema  GASTROINTESTINAL: No abdominal or epigastric pain. No nausea, vomiting, or hematemesis; No diarrhea or constipation. No melena or hematochezia.  GENITOURINARY: No dysuria, frequency or hematuria  NEUROLOGICAL: No numbness or weakness  SKIN: No itching, burning, rashes, or lesions   All other review of systems is negative unless indicated above.

## 2018-05-29 NOTE — H&P ADULT - PROBLEM SELECTOR PLAN 3
Not on home diabetes medication  -low ISS  -f/u A1c in AM Presented with right lobe pneumonia associated with lactate 4. UA neg  -f/u BCx, UCx, RVP Presented with right lobe pneumonia associated with lactate 4 and hypotension. UA neg  -f/u BCx, UCx, RVP  -broad spectrum abx  -s/p 2.5L IVF in ED

## 2018-05-29 NOTE — ED PROVIDER NOTE - CARDIAC, MLM
tachycardic, regular rhythm.  Heart sounds S1, S2.  No murmurs, rubs or gallops.  +2 radial and DP pulses.

## 2018-05-29 NOTE — H&P ADULT - ATTENDING COMMENTS
patient seen and examined on 5/29 with medical team, agree with above resident H&P.    severe sepsis, acute hypoxic respiratory failure and acute encephalopathy likely 2/2 underlying pneumonia. now s/p bipap, 2.5L IVF, broad spectrum antibiotics. lactic acidosis, mild coagulopathy and transaminitis noted on admission likely 2/2 underlying sepsis. type II nstemi in setting of sepsis-- trend troponins and monitor on tele for now. appreciate cardiology recs. continue remainder of plan per resident note. patient seen and examined on 5/29 with medical team, agree with above resident H&P.    severe sepsis, acute hypoxic respiratory failure and acute encephalopathy likely 2/2 underlying pneumonia. now s/p bipap, 2.5L IVF, broad spectrum antibiotics. lactic acidosis, mild coagulopathy and transaminitis noted on admission likely 2/2 underlying sepsis. type II nstemi in setting of sepsis-- trend troponins and monitor on tele for now. check TTE in light of hx of aortic stenosis and current resp failure. pt with hx of hyperdynamic LV function. appreciate cardiology recs. continue remainder of plan per resident note.

## 2018-05-29 NOTE — CONSULT NOTE ADULT - ATTENDING COMMENTS
Clothing Patient interviewed and examined.  Chart reviewed and note edited where appropriate.  Case discussed with fellow.  Agree w/ Assessment and Plan as outlined.    Nico Kee MD [facility]  Spectra:  [phone]  Office: [phone]

## 2018-05-29 NOTE — H&P ADULT - NSHPLABSRESULTS_GEN_ALL_CORE
12.9   19.1  )-----------( 217      ( 29 May 2018 14:13 )             37.5     CBC Full  -  ( 29 May 2018 14:13 )  WBC Count : 19.1 K/uL  Hemoglobin : 12.9 g/dL  Hematocrit : 37.5 %  Platelet Count - Automated : 217 K/uL  Mean Cell Volume : 92.6 fl  Mean Cell Hemoglobin : 31.8 pg  Mean Cell Hemoglobin Concentration : 34.3 gm/dL  Auto Neutrophil # : 17.5 K/uL  Auto Lymphocyte # : 0.4 K/uL  Auto Monocyte # : 1.1 K/uL  Auto Eosinophil # : 0.0 K/uL  Auto Basophil # : 0.0 K/uL  Auto Neutrophil % : 75.0 %  Auto Lymphocyte % : 1.0 %  Auto Monocyte % : 6.0 %  Auto Eosinophil % : x  Auto Basophil % : x    -    137  |  100  |  28<H>  ----------------------------<  191<H>  3.9   |  20<L>  |  1.07    Ca    8.6      29 May 2018 14:13    TPro  6.6  /  Alb  3.3  /  TBili  1.5<H>  /  DBili  x   /  AST  29  /  ALT  34  /  AlkPhos  129<H>      Creatinine Trend: 1.07<--  LIVER FUNCTIONS - ( 29 May 2018 14:13 )  Alb: 3.3 g/dL / Pro: 6.6 g/dL / ALK PHOS: 129 U/L / ALT: 34 U/L / AST: 29 U/L / GGT: x           PT/INR - ( 29 May 2018 14:13 )   PT: 13.7 sec;   INR: 1.26 ratio         PTT - ( 29 May 2018 14:13 )  PTT:28.3 sec  CARDIAC MARKERS ( 29 May 2018 14:13 )  x     / 0.12 ng/mL / x     / x     / x            Urinalysis Basic - ( 29 May 2018 14:30 )    Color: PL Yellow / Appearance: Clear / S.008 / pH: x  Gluc: x / Ketone: Negative  / Bili: Negative / Urobili: Negative   Blood: x / Protein: Trace / Nitrite: Negative   Leuk Esterase: Small / RBC: 0-2 /HPF / WBC 6-10 /HPF   Sq Epi: x / Non Sq Epi: OCC /HPF / Bacteria: x    EKG: sinus rhythm 114, no ischemic changes, QTc 471    MICROBIOLOGY:  RVP pending  BCx x 2 pending  UCx pending    IMAGING:  < from: Xray Chest 1 View- PORTABLE-Urgent (18 @ 14:07) >  Impression:  The heart is normal in size. Right pleural effusion. Right lung   pneumonia. The left lung appears to be clear. Degenerative changes of the   thoracic spine.    < end of copied text > 12.9   19.1  )-----------( 217      ( 29 May 2018 14:13 )             37.5     CBC Full  -  ( 29 May 2018 14:13 )  WBC Count : 19.1 K/uL  Hemoglobin : 12.9 g/dL  Hematocrit : 37.5 %  Platelet Count - Automated : 217 K/uL  Mean Cell Volume : 92.6 fl  Mean Cell Hemoglobin : 31.8 pg  Mean Cell Hemoglobin Concentration : 34.3 gm/dL  Auto Neutrophil # : 17.5 K/uL  Auto Lymphocyte # : 0.4 K/uL  Auto Monocyte # : 1.1 K/uL  Auto Eosinophil # : 0.0 K/uL  Auto Basophil # : 0.0 K/uL  Auto Neutrophil % : 75.0 %  Auto Lymphocyte % : 1.0 %  Auto Monocyte % : 6.0 %  Auto Eosinophil % : x  Auto Basophil % : x    -    137  |  100  |  28<H>  ----------------------------<  191<H>  3.9   |  20<L>  |  1.07    Ca    8.6      29 May 2018 14:13    TPro  6.6  /  Alb  3.3  /  TBili  1.5<H>  /  DBili  x   /  AST  29  /  ALT  34  /  AlkPhos  129<H>      Creatinine Trend: 1.07<--  LIVER FUNCTIONS - ( 29 May 2018 14:13 )  Alb: 3.3 g/dL / Pro: 6.6 g/dL / ALK PHOS: 129 U/L / ALT: 34 U/L / AST: 29 U/L / GGT: x           PT/INR - ( 29 May 2018 14:13 )   PT: 13.7 sec;   INR: 1.26 ratio         PTT - ( 29 May 2018 14:13 )  PTT:28.3 sec  CARDIAC MARKERS ( 29 May 2018 14:13 )  x     / 0.12 ng/mL / x     / x     / x            Urinalysis Basic - ( 29 May 2018 14:30 )    Color: PL Yellow / Appearance: Clear / S.008 / pH: x  Gluc: x / Ketone: Negative  / Bili: Negative / Urobili: Negative   Blood: x / Protein: Trace / Nitrite: Negative   Leuk Esterase: Small / RBC: 0-2 /HPF / WBC 6-10 /HPF   Sq Epi: x / Non Sq Epi: OCC /HPF / Bacteria: x    EKG: sinus rhythm 114, no ischemic changes, QTc 471    MICROBIOLOGY:  RVP pending  BCx x 2 pending  UCx pending    IMAGING:  < from: Xray Chest 1 View- PORTABLE-Urgent (18 @ 14:07) >  Impression:  The heart is normal in size. Right pleural effusion. Right lung   pneumonia. The left lung appears to be clear. Degenerative changes of the   thoracic spine.  PERSONALLY REVIEWED.    EKG: sinus tachycardia. TWI in inferior leads.

## 2018-05-29 NOTE — ED PROVIDER NOTE - OBJECTIVE STATEMENT
Patient is DNR/DNI coming from nursing home with dyspnea.  Was found to be febrile today to 103, received ceftriaxone and then vancomycin and zosyn about 2 hours prior to arrival.  Was having increasing dyspnea so was put on nonrebreather at 100%, sat 90.  Was put on cpap by EMS without much improvement.  Arrived speaking in 2 word sentences, alert and oriented x2.  Denies pain. Has no known history of CHF.  Placed on bipap in ED with improvement of sats from 84% on room air to 97%.  Son arrived shortly after. Has rescinded do not hospitalize order

## 2018-05-29 NOTE — H&P ADULT - PROBLEM SELECTOR PLAN 8
DVT ppx: HSQ  Diet: NPO while lethargic and BIPAP  Dispo; likely back to NH -outpatient follow up  -s/p 2.5L IVF, hold additional for now -check TTE (last in 2012 with hyperdynamic LV func, mild AS)  -outpatient follow up  -s/p 2.5L IVF, hold additional for now

## 2018-05-29 NOTE — H&P ADULT - PROBLEM SELECTOR PLAN 4
-ASA 81 daily Not on home diabetes medication  -low ISS  -f/u A1c in AM type II nstemi in setting of demand ischemia from sepsis  trend cardiac enzymes  cardiology recs appreciated  cont asa holding b blocker, not on statin

## 2018-05-29 NOTE — ED PROVIDER NOTE - CRITICAL CARE PROVIDED
additional history taking/direct patient care (not related to procedure)/documentation/consult w/ pt's family directly relating to pts condition/interpretation of diagnostic studies/consultation with other physicians/conducted a detailed discussion of DNR status

## 2018-05-29 NOTE — ED PROVIDER NOTE - MEDICAL DECISION MAKING DETAILS
Patient with dyspnea most likely related to sepsis, possibly respiratory origin.  DNR/DNR, on bipap maintaining sats.  septic w/u, already received antibiotics.  Son is OK with pressors if needed after fluid challenge.  Will also check EKG and troponin.  No LE edema or JVD, no history blood clots.  Hx cancer, may consider PE however this is lower on ddx at this time given fever. Dr. Camejo (Attending Physician)  Patient with dyspnea most likely related to sepsis, possibly respiratory origin.  DNR/DNI, on bipap maintaining sats.  septic w/u, already received antibiotics.  Son is OK with pressors if needed after fluid challenge.  Will also check EKG and troponin.  No LE edema or JVD, no history blood clots.  Hx cancer, may consider PE however this is lower on ddx at this time given fever.

## 2018-05-29 NOTE — ED PROVIDER NOTE - ATTENDING CONTRIBUTION TO CARE
Dr. Camejo (Attending Physician)  I performed a history and physical exam of the patient and discussed their management with the resident. I reviewed the resident's note and agree with the documented findings and plan of care. My medical decision making and observations are found above.

## 2018-05-29 NOTE — ED PROVIDER NOTE - CARE PLAN
Principal Discharge DX:	Respiratory distress  Secondary Diagnosis:	Sepsis  Secondary Diagnosis:	Pneumonia

## 2018-05-29 NOTE — H&P ADULT - PROBLEM SELECTOR PLAN 6
Lives at nursing home [facility] 1/2018. Currently lethargic, oriented x 2. QTc 471 (5/29)  -Will obtain baseline mental status from nursing home tmrw  -While lethargic and BIPAP, will hold quetiapine and buspar -ASA 81 daily

## 2018-05-29 NOTE — ED ADULT NURSE NOTE - OBJECTIVE STATEMENT
84 y/o female presenting to ED from nursing home via EMS for dyspnea, on CPAP on arrival. As per EMS: pt was febrile today 103, received antibiotics. Upon exam, pt AOx3, able to speak in 2 word sentences, has increased WOB, breathing with abdominal muscles, O2 sat 89% on CPAP. Pt placed on bipap 12/5, R14 FIO2 100% on arrival, SPO2 improved to 97%. Pt lung sounds CTA bilaterally, abdomen round, distended. MD at bedside. 2 Large bore IVs started. Labs drawn & sent. Pt straight cath for urine under sterile technique, 600 mL removed. Placed on cardiac monitor, sinus tachy.

## 2018-05-29 NOTE — H&P ADULT - ASSESSMENT
86yo woman with hx dementia, severe AS, HTN, HLD, DM2 presenting from nursing home with severe sepsis likely due to pneumonia.

## 2018-05-29 NOTE — H&P ADULT - PROBLEM SELECTOR PLAN 1
-f/u RVP Round to have RLL PNA associated with R pleural effusion concerning for aspiration PNA due to pt's lethargy and dyspnea.  -f/u RVP, lactate in AM Round to have RLL PNA associated with R pleural effusion concerning for aspiration PNA leading to acute hypoxic respiratory failure requiring BIPAP. Aspiration possibly related to progessiva dementia vs lethargic due to sepsis  -Treat with zosyn, vanc  -Continue BIPAP to maintain SpO2 >92%  -Monitor vital signs q4h  -Will hold IVF overnight due to severe AS and pleural effusion and reassess in morning s/p 500cc bolus.  -f/u vanc trough  -f/u BCx from nursing home (likely more accurate as drawn before abx)  -f/u RVP, lactate in AM Round to have RLL PNA associated with R pleural effusion concerning for aspiration PNA leading to acute hypoxic respiratory failure requiring BIPAP. Aspiration possibly related to progessiva dementia vs lethargic due to sepsis  -Treat with zosyn, vanc  -Continue BIPAP to maintain SpO2 >92%  -Monitor vital signs q4h  -Will hold IVF overnight due to severe AS and pleural effusion and reassess in morning s/p 2.5L in ED  -f/u vanc trough  -f/u BCx and ucx from nursing home (likely more accurate as drawn before abx)  -f/u RVP, lactate in AM

## 2018-05-29 NOTE — ED PROVIDER NOTE - PROGRESS NOTE DETAILS
Patient appears comfortable on bipap.  HR decreased to 103.  /76.  Dr. Tran called re: patient, has severe Aortic stenosis, recommends gentle fluids, will stop at 500 cc and reassess.  VBG being repeated.  Admitted to dr Rivas.  Denisse Garcia, PGY3

## 2018-05-30 DIAGNOSIS — R78.81 BACTEREMIA: ICD-10-CM

## 2018-05-30 DIAGNOSIS — I21.4 NON-ST ELEVATION (NSTEMI) MYOCARDIAL INFARCTION: ICD-10-CM

## 2018-05-30 LAB
-  STREPTOCOCCUS SP. (NOT GRP A, B OR S PNEUMONIAE): SIGNIFICANT CHANGE UP
ANION GAP SERPL CALC-SCNC: 16 MMOL/L — SIGNIFICANT CHANGE UP (ref 5–17)
BUN SERPL-MCNC: 31 MG/DL — HIGH (ref 7–23)
CALCIUM SERPL-MCNC: 9 MG/DL — SIGNIFICANT CHANGE UP (ref 8.4–10.5)
CHLORIDE SERPL-SCNC: 99 MMOL/L — SIGNIFICANT CHANGE UP (ref 96–108)
CK MB BLD-MCNC: 3.4 % — SIGNIFICANT CHANGE UP (ref 0–3.5)
CK MB BLD-MCNC: 4.4 % — HIGH (ref 0–3.5)
CK MB CFR SERPL CALC: 6 NG/ML — HIGH (ref 0–3.8)
CK MB CFR SERPL CALC: 6.5 NG/ML — HIGH (ref 0–3.8)
CK SERPL-CCNC: 147 U/L — SIGNIFICANT CHANGE UP (ref 25–170)
CK SERPL-CCNC: 177 U/L — HIGH (ref 25–170)
CO2 SERPL-SCNC: 18 MMOL/L — LOW (ref 22–31)
CREAT SERPL-MCNC: 1.02 MG/DL — SIGNIFICANT CHANGE UP (ref 0.5–1.3)
GLUCOSE BLDC GLUCOMTR-MCNC: 111 MG/DL — HIGH (ref 70–99)
GLUCOSE BLDC GLUCOMTR-MCNC: 114 MG/DL — HIGH (ref 70–99)
GLUCOSE BLDC GLUCOMTR-MCNC: 116 MG/DL — HIGH (ref 70–99)
GLUCOSE BLDC GLUCOMTR-MCNC: 135 MG/DL — HIGH (ref 70–99)
GLUCOSE SERPL-MCNC: 111 MG/DL — HIGH (ref 70–99)
GRAM STN FLD: SIGNIFICANT CHANGE UP
HAEM INFLU DNA BLD POS QL NAA+NON-PROBE: SIGNIFICANT CHANGE UP
HAEM INFLU DNA BLD POS QL NAA+NON-PROBE: SIGNIFICANT CHANGE UP
HBA1C BLD-MCNC: 6.1 % — HIGH (ref 4–5.6)
HCT VFR BLD CALC: 37.8 % — SIGNIFICANT CHANGE UP (ref 34.5–45)
HGB BLD-MCNC: 12.4 G/DL — SIGNIFICANT CHANGE UP (ref 11.5–15.5)
MAGNESIUM SERPL-MCNC: 1.7 MG/DL — SIGNIFICANT CHANGE UP (ref 1.6–2.6)
MCHC RBC-ENTMCNC: 30.8 PG — SIGNIFICANT CHANGE UP (ref 27–34)
MCHC RBC-ENTMCNC: 32.7 GM/DL — SIGNIFICANT CHANGE UP (ref 32–36)
MCV RBC AUTO: 94.1 FL — SIGNIFICANT CHANGE UP (ref 80–100)
METHOD TYPE: SIGNIFICANT CHANGE UP
METHOD TYPE: SIGNIFICANT CHANGE UP
PHOSPHATE SERPL-MCNC: 2.7 MG/DL — SIGNIFICANT CHANGE UP (ref 2.5–4.5)
PLATELET # BLD AUTO: 159 K/UL — SIGNIFICANT CHANGE UP (ref 150–400)
POTASSIUM SERPL-MCNC: 4.6 MMOL/L — SIGNIFICANT CHANGE UP (ref 3.5–5.3)
POTASSIUM SERPL-SCNC: 4.6 MMOL/L — SIGNIFICANT CHANGE UP (ref 3.5–5.3)
RBC # BLD: 4.02 M/UL — SIGNIFICANT CHANGE UP (ref 3.8–5.2)
RBC # FLD: 12.5 % — SIGNIFICANT CHANGE UP (ref 10.3–14.5)
SODIUM SERPL-SCNC: 133 MMOL/L — LOW (ref 135–145)
SPECIMEN SOURCE: SIGNIFICANT CHANGE UP
SPECIMEN SOURCE: SIGNIFICANT CHANGE UP
TROPONIN T SERPL-MCNC: 0.16 NG/ML — HIGH (ref 0–0.06)
TROPONIN T SERPL-MCNC: 0.2 NG/ML — HIGH (ref 0–0.06)
WBC # BLD: 15.4 K/UL — HIGH (ref 3.8–10.5)
WBC # FLD AUTO: 15.4 K/UL — HIGH (ref 3.8–10.5)

## 2018-05-30 PROCEDURE — 99233 SBSQ HOSP IP/OBS HIGH 50: CPT | Mod: GC

## 2018-05-30 PROCEDURE — 99232 SBSQ HOSP IP/OBS MODERATE 35: CPT

## 2018-05-30 RX ORDER — CEFTRIAXONE 500 MG/1
INJECTION, POWDER, FOR SOLUTION INTRAMUSCULAR; INTRAVENOUS
Qty: 0 | Refills: 0 | Status: DISCONTINUED | OUTPATIENT
Start: 2018-05-30 | End: 2018-05-30

## 2018-05-30 RX ORDER — CEFTRIAXONE 500 MG/1
1 INJECTION, POWDER, FOR SOLUTION INTRAMUSCULAR; INTRAVENOUS ONCE
Qty: 0 | Refills: 0 | Status: COMPLETED | OUTPATIENT
Start: 2018-05-30 | End: 2018-05-30

## 2018-05-30 RX ORDER — CEFTRIAXONE 500 MG/1
1 INJECTION, POWDER, FOR SOLUTION INTRAMUSCULAR; INTRAVENOUS EVERY 24 HOURS
Qty: 0 | Refills: 0 | Status: DISCONTINUED | OUTPATIENT
Start: 2018-05-31 | End: 2018-05-31

## 2018-05-30 RX ORDER — METOPROLOL TARTRATE 50 MG
25 TABLET ORAL DAILY
Qty: 0 | Refills: 0 | Status: DISCONTINUED | OUTPATIENT
Start: 2018-05-30 | End: 2018-06-06

## 2018-05-30 RX ORDER — CEFTRIAXONE 500 MG/1
INJECTION, POWDER, FOR SOLUTION INTRAMUSCULAR; INTRAVENOUS
Qty: 0 | Refills: 0 | Status: DISCONTINUED | OUTPATIENT
Start: 2018-05-30 | End: 2018-05-31

## 2018-05-30 RX ORDER — GABAPENTIN 400 MG/1
100 CAPSULE ORAL THREE TIMES A DAY
Qty: 0 | Refills: 0 | Status: DISCONTINUED | OUTPATIENT
Start: 2018-05-30 | End: 2018-06-06

## 2018-05-30 RX ADMIN — HEPARIN SODIUM 5000 UNIT(S): 5000 INJECTION INTRAVENOUS; SUBCUTANEOUS at 06:05

## 2018-05-30 RX ADMIN — PIPERACILLIN AND TAZOBACTAM 25 GRAM(S): 4; .5 INJECTION, POWDER, LYOPHILIZED, FOR SOLUTION INTRAVENOUS at 06:05

## 2018-05-30 RX ADMIN — Medication 1: at 00:14

## 2018-05-30 RX ADMIN — PANTOPRAZOLE SODIUM 40 MILLIGRAM(S): 20 TABLET, DELAYED RELEASE ORAL at 11:16

## 2018-05-30 RX ADMIN — Medication 3 MILLIGRAM(S): at 21:40

## 2018-05-30 RX ADMIN — GABAPENTIN 100 MILLIGRAM(S): 400 CAPSULE ORAL at 12:50

## 2018-05-30 RX ADMIN — SERTRALINE 12.5 MILLIGRAM(S): 25 TABLET, FILM COATED ORAL at 11:17

## 2018-05-30 RX ADMIN — SENNA PLUS 2 TABLET(S): 8.6 TABLET ORAL at 21:41

## 2018-05-30 RX ADMIN — Medication 100 MILLIGRAM(S): at 06:05

## 2018-05-30 RX ADMIN — GABAPENTIN 100 MILLIGRAM(S): 400 CAPSULE ORAL at 21:41

## 2018-05-30 RX ADMIN — Medication 15 MILLIGRAM(S): at 16:50

## 2018-05-30 RX ADMIN — CEFTRIAXONE 100 GRAM(S): 500 INJECTION, POWDER, FOR SOLUTION INTRAMUSCULAR; INTRAVENOUS at 11:16

## 2018-05-30 RX ADMIN — Medication 81 MILLIGRAM(S): at 11:17

## 2018-05-30 RX ADMIN — Medication 15 MILLIGRAM(S): at 06:05

## 2018-05-30 RX ADMIN — Medication 100 MILLIGRAM(S): at 16:50

## 2018-05-30 RX ADMIN — HEPARIN SODIUM 5000 UNIT(S): 5000 INJECTION INTRAVENOUS; SUBCUTANEOUS at 21:41

## 2018-05-30 RX ADMIN — HEPARIN SODIUM 5000 UNIT(S): 5000 INJECTION INTRAVENOUS; SUBCUTANEOUS at 12:50

## 2018-05-30 RX ADMIN — Medication 25 MILLIGRAM(S): at 12:50

## 2018-05-30 RX ADMIN — PREGABALIN 100 MICROGRAM(S): 225 CAPSULE ORAL at 11:17

## 2018-05-30 NOTE — PROGRESS NOTE ADULT - PROBLEM SELECTOR PLAN 7
Lives at nursing home [facility] 1/2018. Currently lethargic, oriented x 2. QTc 471 (5/29)  -Will obtain baseline mental status from nursing home tmrw  -While lethargic and BIPAP, will hold quetiapine and buspar -ASA 81 daily

## 2018-05-30 NOTE — PROVIDER CONTACT NOTE (CRITICAL VALUE NOTIFICATION) - ACTION/TREATMENT ORDERED:
MD aware & at patient's bedside assessing patient. MD reviewed patient's Lab Blood - Culture results, all other lab results and orders. No new orders at this time.

## 2018-05-30 NOTE — PROGRESS NOTE ADULT - PROBLEM SELECTOR PLAN 9
DVT ppx: HSQ  Diet: NPO while lethargic and BIPAP  Dispo; likely back to NH; PT consult Not a TAVR candidate, last in 2012 with hyperdynamic LV func, mild AS. Outpatient follow up  -Restart Toprol 25  -f/u TTE

## 2018-05-30 NOTE — PROGRESS NOTE ADULT - PROBLEM SELECTOR PLAN 6
-ASA 81 daily Not on home diabetes medication. A1c 6.1  -low ISS  -if FS stable while eating, consider discontinuing FS checks

## 2018-05-30 NOTE — PROGRESS NOTE ADULT - SUBJECTIVE AND OBJECTIVE BOX
Cardiology Progress Note    Interval events: No acute events overnight. On BiPAP. Troponin 0.12 -> 0.2    Tele: sinus     Medications:  aspirin enteric coated 81 milliGRAM(s) Oral daily  busPIRone 15 milliGRAM(s) Oral two times a day  cyanocobalamin 100 MICROGram(s) Oral daily  dextrose 40% Gel 15 Gram(s) Oral once PRN  dextrose 5%. 1000 milliLiter(s) IV Continuous <Continuous>  dextrose 50% Injectable 12.5 Gram(s) IV Push once  dextrose 50% Injectable 25 Gram(s) IV Push once  dextrose 50% Injectable 25 Gram(s) IV Push once  docusate sodium 100 milliGRAM(s) Oral two times a day  glucagon  Injectable 1 milliGRAM(s) IntraMuscular once PRN  heparin  Injectable 5000 Unit(s) SubCutaneous every 8 hours  insulin lispro (HumaLOG) corrective regimen sliding scale   SubCutaneous every 6 hours  melatonin 3 milliGRAM(s) Oral at bedtime  pantoprazole  Injectable 40 milliGRAM(s) IV Push daily  piperacillin/tazobactam IVPB. 3.375 Gram(s) IV Intermittent every 8 hours  senna 2 Tablet(s) Oral at bedtime  sertraline 12.5 milliGRAM(s) Oral daily  vancomycin  IVPB 1000 milliGRAM(s) IV Intermittent every 24 hours      Review of Systems:  Constitutional: [ ] Fever [ ] Chills [ ] Fatigue [ ] Weight change   HEENT: [ ] Blurred vision [ ] Eye Pain [ ] Headache [ ] Runny nose [ ] Sore Throat   Respiratory: [ ] Cough [ ] Wheezing [ ] Shortness of breath  Cardiovascular: [ ] Chest Pain [ ] Palpitations [ ] ROMERO [ ] PND [ ] Orthopnea  Gastrointestinal: [ ] Abdominal Pain [ ] Diarrhea [ ] Constipation [ ] Hemorrhoids [ ] Nausea [ ] Vomiting  Genitourinary: [ ] Nocturia [ ] Dysuria [ ] Incontinence  Extremities: [ ] Swelling [ ] Joint Pain  Neurologic: [ ] Focal deficit [ ] Paresthesias [ ] Syncope  Lymphatic: [ ] Swelling [ ] Lymphadenopathy   Skin: [ ] Rash [ ] Ecchymoses [ ] Wounds [ ] Lesions  Psychiatry: [ ] Depression [ ] Suicidal/Homicidal Ideation [ ] Anxiety [ ] Sleep Disturbances  [x] 10 point review of systems is otherwise negative except as mentioned above            [ ]Unable to obtain    Vitals:  T(C): 37.5 (18 @ 04:28), Max: 40 (18 @ 13:48)  HR: 94 (18 @ 04:28) (93 - 127)  BP: 105/68 (18 @ 04:28) (78/49 - 127/66)  BP(mean): 59 (18 @ 14:20) (59 - 59)  RR: 18 (18 @ 04:28) (16 - 30)  SpO2: 97% (18 @ 04:28) (70% - 100%)  Wt(kg): --  Daily Height in cm: 162.56 (30 May 2018 00:30)    Daily Weight in k (30 May 2018 04:28)  I&O's Summary      Physical Exam:  Appearance: NAD on BiPAP  Eyes: PERRL, EOMI  HENT: Normal oral muscosa, NC/AT  Cardiovascular: normal S1 and absent S2, rrr, III/VI crescendo decrescendo murmur mid peaking at aortic area radiating to carotids and subclavians.  Similar mixed frequency/yosef mat apex, no edema, normal JVP  Respiratory: Clear to auscultation bilaterally  Gastrointestinal: Soft, non-tender, non-distended, BS+  Musculoskeletal: No clubbing, no joint deformity   Neurologic: Non-focal  Lymphatic: No lymphadenopathy  Psychiatry: AAOx3, mood & affect appropriate  Skin: No rashes, no ecchymoses, no cyanosis    Labs:                        12.9   19.1  )-----------( 217      ( 29 May 2018 14:13 )             37.5         137  |  100  |  28<H>  ----------------------------<  191<H>  3.9   |  20<L>  |  1.07    Ca    8.6      29 May 2018 14:13    TPro  6.6  /  Alb  3.3  /  TBili  1.5<H>  /  DBili  x   /  AST  29  /  ALT  34  /  AlkPhos  129<H>  05-    PT/INR - ( 29 May 2018 14:13 )   PT: 13.7 sec;   INR: 1.26 ratio         PTT - ( 29 May 2018 14:13 )  PTT:28.3 sec  CARDIAC MARKERS ( 29 May 2018 23:39 )  x     / 0.20 ng/mL / 147 U/L / x     / 6.5 ng/mL  CARDIAC MARKERS ( 29 May 2018 14:13 )  x     / 0.12 ng/mL / x     / x     / x                  New results/imaging: Cardiology Progress Note    Interval events: No acute events overnight. On BiPAP. Troponin 0.12 -> 0.2  No chest, throat, jaw. arm or upper back pain.  No dyspnea, orthopnea, PND.  No edema. No palpitations, dizziness or syncope.     Tele: sinus     Medications:  aspirin enteric coated 81 milliGRAM(s) Oral daily  busPIRone 15 milliGRAM(s) Oral two times a day  cyanocobalamin 100 MICROGram(s) Oral daily  dextrose 40% Gel 15 Gram(s) Oral once PRN  dextrose 5%. 1000 milliLiter(s) IV Continuous <Continuous>  dextrose 50% Injectable 12.5 Gram(s) IV Push once  dextrose 50% Injectable 25 Gram(s) IV Push once  dextrose 50% Injectable 25 Gram(s) IV Push once  docusate sodium 100 milliGRAM(s) Oral two times a day  glucagon  Injectable 1 milliGRAM(s) IntraMuscular once PRN  heparin  Injectable 5000 Unit(s) SubCutaneous every 8 hours  insulin lispro (HumaLOG) corrective regimen sliding scale   SubCutaneous every 6 hours  melatonin 3 milliGRAM(s) Oral at bedtime  pantoprazole  Injectable 40 milliGRAM(s) IV Push daily  piperacillin/tazobactam IVPB. 3.375 Gram(s) IV Intermittent every 8 hours  senna 2 Tablet(s) Oral at bedtime  sertraline 12.5 milliGRAM(s) Oral daily  vancomycin  IVPB 1000 milliGRAM(s) IV Intermittent every 24 hours      Review of Systems:  Constitutional: [ ] Fever [ ] Chills [ ] Fatigue [ ] Weight change   HEENT: [ ] Blurred vision [ ] Eye Pain [ ] Headache [ ] Runny nose [ ] Sore Throat   Respiratory: [ ] Cough [ ] Wheezing [ ] Shortness of breath  Cardiovascular: [ ] Chest Pain [ ] Palpitations [ ] ROMERO [ ] PND [ ] Orthopnea  Gastrointestinal: [ ] Abdominal Pain [ ] Diarrhea [ ] Constipation [ ] Hemorrhoids [ ] Nausea [ ] Vomiting  Genitourinary: [ ] Nocturia [ ] Dysuria [ ] Incontinence  Extremities: [ ] Swelling [ ] Joint Pain  Neurologic: [ ] Focal deficit [ ] Paresthesias [ ] Syncope  Lymphatic: [ ] Swelling [ ] Lymphadenopathy   Skin: [ ] Rash [ ] Ecchymoses [ ] Wounds [ ] Lesions  Psychiatry: [ ] Depression [ ] Suicidal/Homicidal Ideation [ ] Anxiety [ ] Sleep Disturbances  [x] 10 point review of systems is otherwise negative except as mentioned above            [ ]Unable to obtain    Vitals:  T(C): 37.5 (18 @ 04:28), Max: 40 (18 @ 13:48)  HR: 94 (18 @ 04:28) (93 - 127)  BP: 105/68 (18 @ 04:28) (78/49 - 127/66)  BP(mean): 59 (18 @ 14:20) (59 - 59)  RR: 18 (18 @ 04:28) (16 - 30)  SpO2: 97% (18 @ 04:28) (70% - 100%)  Wt(kg): --  Daily Height in cm: 162.56 (30 May 2018 00:30)    Daily Weight in k (30 May 2018 04:28)  I&O's Summary      Physical Exam:  Appearance: NAD on BiPAP  Eyes: PERRL, EOMI  HENT: Normal oral muscosa, NC/AT  Cardiovascular: normal S1 and absent S2, rrr, III/VI crescendo decrescendo murmur mid peaking at aortic area radiating to carotids and subclavians.  Similar mixed frequency/yosef mat apex, no edema, normal JVP  Respiratory: Clear to auscultation bilaterally  Gastrointestinal: Soft, non-tender, non-distended, BS+  Musculoskeletal: No clubbing, no joint deformity   Neurologic: Non-focal  Lymphatic: No lymphadenopathy  Psychiatry: AAOx3, mood & affect appropriate  Skin: No rashes, no ecchymoses, no cyanosis    Labs:                        12.9   19.1  )-----------( 217      ( 29 May 2018 14:13 )             37.5         137  |  100  |  28<H>  ----------------------------<  191<H>  3.9   |  20<L>  |  1.07    Ca    8.6      29 May 2018 14:13    TPro  6.6  /  Alb  3.3  /  TBili  1.5<H>  /  DBili  x   /  AST  29  /  ALT  34  /  AlkPhos  129<H>      PT/INR - ( 29 May 2018 14:13 )   PT: 13.7 sec;   INR: 1.26 ratio         PTT - ( 29 May 2018 14:13 )  PTT:28.3 sec  CARDIAC MARKERS ( 29 May 2018 23:39 )  x     / 0.20 ng/mL / 147 U/L / x     / 6.5 ng/mL  CARDIAC MARKERS ( 29 May 2018 14:13 )  x     / 0.12 ng/mL / x     / x     / x                  New results/imaging:

## 2018-05-30 NOTE — PROGRESS NOTE ADULT - PROBLEM SELECTOR PLAN 8
-check TTE (last in 2012 with hyperdynamic LV func, mild AS)  -outpatient follow up  -s/p 2.5L IVF, hold additional for now Lives at nursing home since 1/2018. Oriented x 2. QTc 471 (5/29)  -Will obtain baseline mental status from nursing home  -Continue quetiapine and Buspar

## 2018-05-30 NOTE — PROGRESS NOTE ADULT - PROBLEM SELECTOR PLAN 2
-Hold toprol XL due to hypotension Round to have RLL PNA associated with R pleural effusion concerning for aspiration PNA leading to acute hypoxic respiratory failure requiring BIPAP, improved to nasal cannula 6L. Aspiration possibly related to progessiva dementia vs lethargic due to sepsis. RVP neg  -Continue ceftriaxone  -Titrate oxygen to maintain SpO2 >92%, currently on 6L NC  -Monitor vital signs q4h  -f/u BCx and UCx from nursing home (likely more accurate as drawn before abx)

## 2018-05-30 NOTE — PROGRESS NOTE ADULT - PROBLEM SELECTOR PLAN 5
Not on home diabetes medication  -low ISS  -f/u A1c in AM type II NSTEMI in setting of demand ischemia from sepsis. Trop peaked 0.2, now downtrended  -Cardiology recs appreciated  -Continue ASA, restart Toprol  -Not on a home statin

## 2018-05-30 NOTE — PROGRESS NOTE ADULT - PROBLEM SELECTOR PLAN 1
Round to have RLL PNA associated with R pleural effusion concerning for aspiration PNA leading to acute hypoxic respiratory failure requiring BIPAP. Aspiration possibly related to progessiva dementia vs lethargic due to sepsis  -Treat with zosyn, vanc  -Continue BIPAP to maintain SpO2 >92%  -Monitor vital signs q4h  -Will hold IVF overnight due to severe AS and pleural effusion and reassess in morning s/p 2.5L in ED  -f/u vanc trough  -f/u BCx and ucx from nursing home (likely more accurate as drawn before abx)  -f/u RVP, lactate in AM Haemophilus influenzae bacteremia  -[place] abx to ceftriaxone  -ID consulted, appreciate recs  -f/u sensitivities, BCx tmrw AM, TTE Haemophilus influenzae bacteremia likely from CAP  -[place] abx to ceftriaxone  -ID consulted, appreciate recs  -f/u sensitivities, BCx tmrw AM, TTE

## 2018-05-30 NOTE — PROGRESS NOTE ADULT - PROBLEM SELECTOR PLAN 3
Presented with right lobe pneumonia associated with lactate 4 and hypotension. UA neg  -f/u BCx, UCx, RVP  -broad spectrum abx  -s/p 2.5L IVF in ED -restart Toprol now that hemodynamically stabilized

## 2018-05-30 NOTE — PROGRESS NOTE ADULT - PROBLEM SELECTOR PLAN 4
type II nstemi in setting of demand ischemia from sepsis  trend cardiac enzymes  cardiology recs appreciated  cont asa holding b blocker, not on statin Presented with right lobe pneumonia associated and Haemophilus influenzae with lactate 4 and hypotension. UA and RVP neg. s/p 2.5L IVF in ED  -lactate improved to 2.7  -continue CTX

## 2018-05-30 NOTE — PROVIDER CONTACT NOTE (CRITICAL VALUE NOTIFICATION) - BACKGROUND
Patient admitted for Acute Respiratory Distress, Non-ST elevation Myocardial Infarction, Sepsis, Aortic Stenosis, Dementia, Diabetes, Hypertension, Pneumonia.

## 2018-05-30 NOTE — PROGRESS NOTE ADULT - ATTENDING COMMENTS
Patient interviewed and examined.  Chart reviewed and note edited where appropriate.  Case discussed with fellow.  Agree w/ Assessment and Plan as outlined.    Nico Kee MD [facility]  Spectra:  [phone]  Office: [phone]

## 2018-05-30 NOTE — CONSULT NOTE ADULT - ASSESSMENT
87 yr old from nursing home with confusion sob dementia admitted with right lower lobe infiltrate and blood cultures positive for H. flu.   Seems to be improving    Bacteremic H flu pneumonia    agree with ceftriaxone   will be able to transition to po antibiotics in 72 hrs    would check immunoglobulins.
85F with dementia, severe AS, DM, HTN, HLD living in a nursing home presents with fever and respiratory distress. Troponin elevated to 0.12. Nonspecific EKG changes.  Type II NSTEMI in setting of sepsis.    #Type II NSTEMI  -- continue asa/statin  -- when sepsis picture resolves can start metoprolol  -- TTE    #Severe AS  -- outpatient follow up    #HTN  -- hold antihypertensives in setting of sepsis    Salazar Webb MD

## 2018-05-30 NOTE — PROVIDER CONTACT NOTE (CRITICAL VALUE NOTIFICATION) - ASSESSMENT
Patient's Two Lab Culture-Blood from 29-May-2018 Preliminary Results : Growth in aerobic bottle: Gram Negative Coccobacilli. Patient has active orders for IVPB Zosyn and IVPB Vancomycin.

## 2018-05-30 NOTE — PROGRESS NOTE ADULT - ATTENDING COMMENTS
severe sepsis, acute hypoxic respiratory failure and acute encephalopathy 2/2 H. influenza CAP with bacteremia. Clinically improved today, on nasal cannula and off bipap, can narrow antibiotics to ceftriaxone. lactate improved. check repeat blood cx, follow up repeat INR and LFTs to assess for improvement. no longer need to trend trops, they've peaked. resume bblocker. check TTE in light of hx of aortic stenosis and current resp failure. pt with hx of hyperdynamic LV function. appreciate cardiology recs. continue remainder of plan per resident note

## 2018-05-30 NOTE — PROGRESS NOTE ADULT - ASSESSMENT
85F with dementia, severe AS, DM, HTN, HLD living in a nursing home presents with fever and respiratory distress. Troponin elevated to 0.12. Nonspecific EKG changes.  Type II NSTEMI in setting of sepsis.    #Type II NSTEMI  -- continue asa/statin  -- when sepsis picture resolves can start metoprolol  -- TTE    #Severe AS  -- outpatient follow up    #HTN  -- hold antihypertensives in setting of sepsis    Salazar Webb MD 85F with dementia, severe AS, DM, HTN, HLD living in a nursing home presents with fever and respiratory distress. Troponin elevated to 0.12. Nonspecific EKG changes.  Type II NSTEMI in setting of sepsis.    #Type II NSTEMI  -- continue asa/statin  -- when sepsis picture resolves can start metoprolol  -- TTE    #Severe AS  -- outpatient follow up. Not a candidate for TAVR.    #HTN  -- hold antihypertensives in setting of sepsis    Salazar Webb MD

## 2018-05-30 NOTE — PROGRESS NOTE ADULT - SUBJECTIVE AND OBJECTIVE BOX
Internal Medicine Progress Note    Cindy Madrid, PGY1  Internal Medicine, team 1  Pager [phone] / [phone]  After 7PM on weekdays and 12PM on weekends, please page #[phone]    Patient is a 85y old  Female who presents with a chief complaint of Fever (29 May 2018 18:29)      SUBJECTIVE / OVERNIGHT EVENTS: Pt condition improved. Off BIPAP this morning, transitioned to 6L NC satting at 95% with RR 22. She has no complaints. Tm 100.3 at 11pm. Troponin peaked at 0.2, now downtrended to 0.16. Denies F/C, cough, pain, SOB.    MEDICATIONS  (STANDING):  aspirin enteric coated 81 milliGRAM(s) Oral daily  busPIRone 15 milliGRAM(s) Oral two times a day  cyanocobalamin 100 MICROGram(s) Oral daily  dextrose 5%. 1000 milliLiter(s) (50 mL/Hr) IV Continuous <Continuous>  dextrose 50% Injectable 12.5 Gram(s) IV Push once  dextrose 50% Injectable 25 Gram(s) IV Push once  dextrose 50% Injectable 25 Gram(s) IV Push once  docusate sodium 100 milliGRAM(s) Oral two times a day  heparin  Injectable 5000 Unit(s) SubCutaneous every 8 hours  insulin lispro (HumaLOG) corrective regimen sliding scale   SubCutaneous every 6 hours  melatonin 3 milliGRAM(s) Oral at bedtime  pantoprazole  Injectable 40 milliGRAM(s) IV Push daily  piperacillin/tazobactam IVPB. 3.375 Gram(s) IV Intermittent every 8 hours  senna 2 Tablet(s) Oral at bedtime  sertraline 12.5 milliGRAM(s) Oral daily  vancomycin  IVPB 1000 milliGRAM(s) IV Intermittent every 24 hours    MEDICATIONS  (PRN):  dextrose 40% Gel 15 Gram(s) Oral once PRN Blood Glucose LESS THAN 70 milliGRAM(s)/deciliter  glucagon  Injectable 1 milliGRAM(s) IntraMuscular once PRN Glucose LESS THAN 70 milligrams/deciliter      Vital Signs Last 24 Hrs  T(C): 37.5 (30 May 2018 04:28), Max: 40 (29 May 2018 13:48)  T(F): 99.5 (30 May 2018 04:28), Max: 104 (29 May 2018 13:48)  HR: 87 (30 May 2018 06:32) (87 - 127)  BP: 105/68 (30 May 2018 04:28) (78/49 - 127/66)  BP(mean): 59 (29 May 2018 14:20) (59 - 59)  RR: 18 (30 May 2018 04:28) (16 - 30)  SpO2: 95% (30 May 2018 06:32) (70% - 100%)    CAPILLARY BLOOD GLUCOSE      POCT Blood Glucose.: 111 mg/dL (30 May 2018 06:15)  POCT Blood Glucose.: 174 mg/dL (29 May 2018 23:52)  POCT Blood Glucose.: 183 mg/dL (29 May 2018 13:49)      I&O's Summary    PHYSICAL EXAM  GENERAL: NAD  HEAD:  Atraumatic  EYES: EOMI, PERRLA, conjunctiva and sclera clear  NECK: Supple, No JVD, no cervical LAD  CHEST/LUNG: Clear to auscultation bilaterally; No wheeze, respirations nonlabored  HEART: Regular rate and rhythm; No murmurs, rubs, or gallops  ABDOMEN: Soft, Nontender, Nondistended; Bowel sounds present  EXTREMITIES:  2+ Peripheral Pulses, No clubbing, cyanosis, or edema  NEURO/PSYCH: AAOx2 (self, hospital), nonfocal  SKIN: No rashes or lesions      LABS:                        12.4   15.4  )-----------( 159      ( 30 May 2018 06:38 )             37.8     CBC Full  -  ( 30 May 2018 06:38 )  WBC Count : 15.4 K/uL  Hemoglobin : 12.4 g/dL  Hematocrit : 37.8 %  Platelet Count - Automated : 159 K/uL  Mean Cell Volume : 94.1 fl  Mean Cell Hemoglobin : 30.8 pg  Mean Cell Hemoglobin Concentration : 32.7 gm/dL  Auto Neutrophil # : x  Auto Lymphocyte # : x  Auto Monocyte # : x  Auto Eosinophil # : x  Auto Basophil # : x  Auto Neutrophil % : x  Auto Lymphocyte % : x  Auto Monocyte % : x  Auto Eosinophil % : x  Auto Basophil % : x    05-30    133<L>  |  99  |  31<H>  ----------------------------<  111<H>  4.6   |  18<L>  |  1.02    Ca    9.0      30 May 2018 06:38  Phos  2.7     05-30  Mg     1.7     05-30    TPro  6.6  /  Alb  3.3  /  TBili  1.5<H>  /  DBili  x   /  AST  29  /  ALT  34  /  AlkPhos  129<H>  05-    Creatinine Trend: 1.02<--, 1.07<--  LIVER FUNCTIONS - ( 29 May 2018 14:13 )  Alb: 3.3 g/dL / Pro: 6.6 g/dL / ALK PHOS: 129 U/L / ALT: 34 U/L / AST: 29 U/L / GGT: x           PT/INR - ( 29 May 2018 14:13 )   PT: 13.7 sec;   INR: 1.26 ratio         PTT - ( 29 May 2018 14:13 )  PTT:28.3 sec  CARDIAC MARKERS ( 30 May 2018 06:38 )  x     / 0.16 ng/mL / 177 U/L / x     / 6.0 ng/mL  CARDIAC MARKERS ( 29 May 2018 23:39 )  x     / 0.20 ng/mL / 147 U/L / x     / 6.5 ng/mL  CARDIAC MARKERS ( 29 May 2018 14:13 )  x     / 0.12 ng/mL / x     / x     / x            Urinalysis Basic - ( 29 May 2018 14:30 )    Color: PL Yellow / Appearance: Clear / S.008 / pH: x  Gluc: x / Ketone: Negative  / Bili: Negative / Urobili: Negative   Blood: x / Protein: Trace / Nitrite: Negative   Leuk Esterase: Small / RBC: 0-2 /HPF / WBC 6-10 /HPF   Sq Epi: x / Non Sq Epi: OCC /HPF / Bacteria: x        MICROBIOLOGY:    Culture - Blood (collected 29 May 2018 16:22)  Source: .Blood Blood  Gram Stain (30 May 2018 08:36):    Growth in aerobic bottle: Gram Negative Coccobacilli  Preliminary Report (30 May 2018 08:37):    Growth in aerobic bottle: Gram Negative Coccobacilli    Culture - Blood (collected 29 May 2018 16:22)  Source: .Blood Blood  Gram Stain (30 May 2018 08:30):    Growth in aerobic bottle: Gram Negative Coccobacilli  Preliminary Report (30 May 2018 08:34):    Growth in aerobic bottle: Gram Negative Coccobacilli    "Due to technical problems, Proteus sp. will Not be reported as part of    the BCID panel until further notice"    ***Blood Panel PCR results on this specimen are available    approximately 3 hours after the Gram stain result.***    Gram stain, PCR, and/or culture results may not always    correspond due to difference in methodologies.    ************************************************************    This PCR assay was performed using [employer]    The following targets are tested for: Enterococcus,    vancomycin resistant enterococci, Listeria monocytogenes,    coagulase negative staphylococci, S. aureus,    methicillin resistant S. aureus, Streptococcus agalactiae    (Group B), S. pneumoniae, S. pyogenes (Group A),    Acinetobacter baumannii, Enterobacter cloacae, E. coli,    Klebsiella oxytoca, K. pneumoniae, Proteus sp.,    Serratia marcescens, Haemophilus influenzae,    Neisseria meningitidis, Pseudomonas aeruginosa, Candida    albicans, C. glabrata, C krusei, C parapsilosis,    C. tropicalis and the KPC resistance gene.        RADIOLOGY & ADDITIONAL TESTS:     CONSULTS: Internal Medicine Progress Note    Cindy Madrid, PGY1  Internal Medicine, team 1  Pager [phone] / [phone]  After 7PM on weekdays and 12PM on weekends, please page #[phone]    Patient is a 85y old  Female who presents with a chief complaint of Fever (29 May 2018 18:29)      SUBJECTIVE / OVERNIGHT EVENTS: Pt condition improved. Off BIPAP this morning, transitioned to 6L NC satting at 95% with RR 22. She has no complaints. Tm 100.3 at 11pm. Troponin peaked at 0.2, now downtrended to 0.16. Denies F/C, cough, pain, SOB.    MEDICATIONS  (STANDING):  aspirin enteric coated 81 milliGRAM(s) Oral daily  busPIRone 15 milliGRAM(s) Oral two times a day  cyanocobalamin 100 MICROGram(s) Oral daily  dextrose 5%. 1000 milliLiter(s) (50 mL/Hr) IV Continuous <Continuous>  dextrose 50% Injectable 12.5 Gram(s) IV Push once  dextrose 50% Injectable 25 Gram(s) IV Push once  dextrose 50% Injectable 25 Gram(s) IV Push once  docusate sodium 100 milliGRAM(s) Oral two times a day  heparin  Injectable 5000 Unit(s) SubCutaneous every 8 hours  insulin lispro (HumaLOG) corrective regimen sliding scale   SubCutaneous every 6 hours  melatonin 3 milliGRAM(s) Oral at bedtime  pantoprazole  Injectable 40 milliGRAM(s) IV Push daily  piperacillin/tazobactam IVPB. 3.375 Gram(s) IV Intermittent every 8 hours  senna 2 Tablet(s) Oral at bedtime  sertraline 12.5 milliGRAM(s) Oral daily  vancomycin  IVPB 1000 milliGRAM(s) IV Intermittent every 24 hours    MEDICATIONS  (PRN):  dextrose 40% Gel 15 Gram(s) Oral once PRN Blood Glucose LESS THAN 70 milliGRAM(s)/deciliter  glucagon  Injectable 1 milliGRAM(s) IntraMuscular once PRN Glucose LESS THAN 70 milligrams/deciliter      Vital Signs Last 24 Hrs  T(C): 37.5 (30 May 2018 04:28), Max: 40 (29 May 2018 13:48)  T(F): 99.5 (30 May 2018 04:28), Max: 104 (29 May 2018 13:48)  HR: 87 (30 May 2018 06:32) (87 - 127)  BP: 105/68 (30 May 2018 04:28) (78/49 - 127/66)  BP(mean): 59 (29 May 2018 14:20) (59 - 59)  RR: 18 (30 May 2018 04:28) (16 - 30)  SpO2: 95% (30 May 2018 06:32) (70% - 100%)    CAPILLARY BLOOD GLUCOSE      POCT Blood Glucose.: 111 mg/dL (30 May 2018 06:15)  POCT Blood Glucose.: 174 mg/dL (29 May 2018 23:52)  POCT Blood Glucose.: 183 mg/dL (29 May 2018 13:49)      I&O's Summary    PHYSICAL EXAM  GENERAL: NAD  HEAD:  Atraumatic  EYES: EOMI, PERRLA, conjunctiva and sclera clear  NECK: Supple, No JVD, no cervical LAD  CHEST/LUNG: Clear to auscultation bilaterally; No wheeze, respirations nonlabored  HEART: Regular rate and rhythm; No murmurs, rubs, or gallops  ABDOMEN: Soft, Nontender, Nondistended; Bowel sounds present  EXTREMITIES:  2+ Peripheral Pulses, No clubbing, cyanosis, or edema  NEURO/PSYCH: AAOx2 (self, hospital), nonfocal  SKIN: No rashes or lesions      LABS:                        12.4   15.4  )-----------( 159      ( 30 May 2018 06:38 )             37.8     CBC Full  -  ( 30 May 2018 06:38 )  WBC Count : 15.4 K/uL  Hemoglobin : 12.4 g/dL  Hematocrit : 37.8 %  Platelet Count - Automated : 159 K/uL  Mean Cell Volume : 94.1 fl  Mean Cell Hemoglobin : 30.8 pg  Mean Cell Hemoglobin Concentration : 32.7 gm/dL  Auto Neutrophil # : x  Auto Lymphocyte # : x  Auto Monocyte # : x  Auto Eosinophil # : x  Auto Basophil # : x  Auto Neutrophil % : x  Auto Lymphocyte % : x  Auto Monocyte % : x  Auto Eosinophil % : x  Auto Basophil % : x    05-30    133<L>  |  99  |  31<H>  ----------------------------<  111<H>  4.6   |  18<L>  |  1.02    Ca    9.0      30 May 2018 06:38  Phos  2.7     05-30  Mg     1.7     05-30    TPro  6.6  /  Alb  3.3  /  TBili  1.5<H>  /  DBili  x   /  AST  29  /  ALT  34  /  AlkPhos  129<H>  05-    Creatinine Trend: 1.02<--, 1.07<--  LIVER FUNCTIONS - ( 29 May 2018 14:13 )  Alb: 3.3 g/dL / Pro: 6.6 g/dL / ALK PHOS: 129 U/L / ALT: 34 U/L / AST: 29 U/L / GGT: x           PT/INR - ( 29 May 2018 14:13 )   PT: 13.7 sec;   INR: 1.26 ratio         PTT - ( 29 May 2018 14:13 )  PTT:28.3 sec  CARDIAC MARKERS ( 30 May 2018 06:38 )  x     / 0.16 ng/mL / 177 U/L / x     / 6.0 ng/mL  CARDIAC MARKERS ( 29 May 2018 23:39 )  x     / 0.20 ng/mL / 147 U/L / x     / 6.5 ng/mL  CARDIAC MARKERS ( 29 May 2018 14:13 )  x     / 0.12 ng/mL / x     / x     / x            Urinalysis Basic - ( 29 May 2018 14:30 )    Color: PL Yellow / Appearance: Clear / S.008 / pH: x  Gluc: x / Ketone: Negative  / Bili: Negative / Urobili: Negative   Blood: x / Protein: Trace / Nitrite: Negative   Leuk Esterase: Small / RBC: 0-2 /HPF / WBC 6-10 /HPF   Sq Epi: x / Non Sq Epi: OCC /HPF / Bacteria: x        MICROBIOLOGY:    Culture - Blood (collected 29 May 2018 16:22)  Source: .Blood Blood  Gram Stain (30 May 2018 08:36):    Growth in aerobic bottle: Gram Negative Coccobacilli  Preliminary Report (30 May 2018 08:37):    Growth in aerobic bottle: Gram Negative Coccobacilli    Culture - Blood (collected 29 May 2018 16:22)  Source: .Blood Blood  Gram Stain (30 May 2018 08:30):    Growth in aerobic bottle: Gram Negative Coccobacilli  Preliminary Report (30 May 2018 08:34):    Growth in aerobic bottle: Gram Negative Coccobacilli    "Due to technical problems, Proteus sp. will Not be reported as part of    the BCID panel until further notice"    ***Blood Panel PCR results on this specimen are available    approximately 3 hours after the Gram stain result.***    Gram stain, PCR, and/or culture results may not always    correspond due to difference in methodologies.    ************************************************************    This PCR assay was performed using [employer].    The following targets are tested for: Enterococcus,    vancomycin resistant enterococci, Listeria monocytogenes,    coagulase negative staphylococci, S. aureus,    methicillin resistant S. aureus, Streptococcus agalactiae    (Group B), S. pneumoniae, S. pyogenes (Group A),    Acinetobacter baumannii, Enterobacter cloacae, E. coli,    Klebsiella oxytoca, K. pneumoniae, Proteus sp.,    Serratia marcescens, Haemophilus influenzae,    Neisseria meningitidis, Pseudomonas aeruginosa, Candida    albicans, C. glabrata, C krusei, C parapsilosis,    C. tropicalis and the KPC resistance gene.        RADIOLOGY & ADDITIONAL TESTS:     CONSULTS: cards, ID

## 2018-05-30 NOTE — CONSULT NOTE ADULT - SUBJECTIVE AND OBJECTIVE BOX
Patient is a 85y old  Female who presents with a chief complaint of Fever (29 May 2018 18:29)    HPI:  86yo woman with hx dementia, severe AS, HTN, HLD, DM2 presenting from nursing home with T103. At nursing home shortly prior to departing, received CTX 1gm, vancomycin 1gm and zosyn 3.375mg. Pt developed dyspnea and was taken to the ED after son rescinded her Do Not Hospitalize order. EMS placed her on CPAP. She is still DNR/DNI. Pt does not know why she is in the hospital. Says she lives with her son at home. She endorses cough and sore throat. Denies pain, abd pain, CP, N/V/D, rash, headache, dysuria. Per ED provider note, pt was speaking in 2 word sentences and dyspneic. Her son was in the ED, but was not present at time of interview. Per nursing home documentation, pt eats a regular diet and does not require help eating at baseline. Pt had a recent admission to [facility] 1/25 - 1/31/2018 for behavioral disturbance deemed likely due to progression of dementia c/b UTI that admission treated with Macrobid 5-day course. Psychiatry saw her that admission and initiated Seroquel and she was discharged to the nursing home.    ED vitals: T104 rectal, , BP 78/49 improved to 103/57, RR 30, sat 70% on RA, improved to 98% on BIPAP. She received tylenol PO, 500cc LR as she had just received abx at the nursing home.  ROS negative on interview in ED- pt poor historian but will follow simple commands and will awaken to voice command/touch. (29 May 2018 18:29)      PAST MEDICAL & SURGICAL HISTORY:  Obesity  Arthropathy left lower leg  Lumbar Spinal Stenosis  History of Hemorrhoidectomy  Benign Heart Murmur  Closed Fracture Ankle, Bimalleolar: right ankle cast immobilization  Vitreous Detachment: right eye 1990    left eye 1995  Cerebrospinal Meningitis  Renal Calculus  CA - Cancer of Uterus: endometrial  DD (Diverticular Disease)  Osteoarthritis of Lumbar Spine  Hyperlipemia  Diabetes Mellitus Type II  Arthroplasty of the Knee: left TKR 2009  Maxillary Sinus Polyp: polyp exc right side 1973  S/P Cataract Surgery: Left eye with IOL 11/2004    Right eye with IOL 12/2004  Ovidio: left foot  S/P Laminectomy: L3-L5  S/P TKR (Total Knee Replacement): right TKA revision  Carpal Tunnel Syndrome: left  release  Acute Lumbar Back Pain: epidural injections x3 and radiofrequecy ablation with little pain relief  2001 another radiofrequency ablation 2002 additional radiofrequency ablation    Epidural injections: 2003, 2005 2007  History of Arthroscopy: left knee debridement  Arthroplasty of the Knee: right knee revision secondary to fall  History of Laminectomy: with discectomy L4-L6  Arthroplasty of the Knee: right TKA  History of Arthroscopy of Knee: right knee  S/P Total Hysterectomy: for endometrial CA  Abnormal Uterine Bleeding: D&amp;C  Contracted, Tendon Sheath: left ankle  S/P Excision of Acoustic Neuroma: right ear  History of Tonsillectomy      Social history:    FAMILY HISTORY:  No pertinent family history in first degree relatives              P   Allergic/Immunologic:	No hives or rash   Allergies    sulfa  patient stated rash (Unknown)  sulfa drugs (Unknown)    Intolerances        Antimicrobials:    cefTRIAXone   IVPB            Vital Signs Last 24 Hrs  T(C): 37.1 (30 May 2018 12:45), Max: 40 (29 May 2018 13:48)  T(F): 98.7 (30 May 2018 12:45), Max: 104 (29 May 2018 13:48)  HR: 86 (30 May 2018 12:45) (75 - 127)  BP: 125/67 (30 May 2018 12:45) (78/49 - 127/66)  BP(mean): 59 (29 May 2018 14:20) (59 - 59)  RR: 18 (30 May 2018 12:45) (16 - 30)  SpO2: 97% (30 May 2018 12:45) (70% - 100%)    PHYSICAL EXAM:Pleasant patient in no acute distress.      Constitutional:Comfortable.Awake and alert  No cachexia     Eyes:PERRL EOMI.NO discharge or conjunctival injection    ENMT:No sinus tenderness.No thrush.No pharyngeal exudate or erythema.Fair dental hygiene    Neck:Supple,No LN,no JVD      Respiratory:Good air entry bilaterally, dcreased on rt   Gastrointestinal:Soft BS(+) no tenderness no masses ,No rebound or guarding    Genitourinary:No CVA tendereness     Rectal:    Extremities:No cyanosis,clubbing or edema.    Vascular:peripheral pulses felt    Neurological:AAO X 3,No grossly focal deficits    Skin:No rash     Lymph Nodes:No palpable LNs    Musculoskeletal:No joint swelling or LOM    Psychiatric:Affect normal.                                12.4   15.4  )-----------( 159      ( 30 May 2018 06:38 )             37.8         05-30    133<L>  |  99  |  31<H>  ----------------------------<  111<H>  4.6   |  18<L>  |  1.02    Ca    9.0      30 May 2018 06:38  Phos  2.7     05-30  Mg     1.7     05-30    TPro  6.6  /  Alb  3.3  /  TBili  1.5<H>  /  DBili  x   /  AST  29  /  ALT  34  /  AlkPhos  129<H>  05-29      RECENT CULTURES:  05-29 @ 16:22  .Blood Blood  Blood Culture PCR  Blood Culture PCR  PCR    Growth in aerobic bottle: Gram Negative Coccobacilli  "Due to technical problems, Proteus sp. will Not be reported as part of  the BCID panel until further notice"  ***Blood Panel PCR results on this specimen are available  approximately 3 hours after the Gram stain result.***  Gram stain, PCR, and/or culture results may not always  correspond due to difference in methodologies.  ************************************************************  This PCR assay was performed using [employer].  The following targets are tested for: Enterococcus,  vancomycin resistant enterococci, Listeria monocytogenes,  coagulase negative staphylococci, S. aureus,  methicillin resistant S. aureus, Streptococcus agalactiae  (Group B), S. pneumoniae, S. pyogenes (Group A),  Acinetobacter baumannii, Enterobacter cloacae, E. coli,  Klebsiella oxytoca, K. pneumoniae, Proteus sp.,  Serratia marcescens, Haemophilus influenzae,  Neisseria meningitidis, Pseudomonas aeruginosa, Candida  albicans, C. glabrata, C krusei, C parapsilosis,  C. tropicalis and the KPC resistance gene.  --      MICROBIOLOGY:  Culture Results:   Growth in aerobic bottle: Gram Negative Coccobacilli  "Due to technical problems, Proteus sp. will Not be reported as part of  the BCID panel until further notice"  ***Blood Panel PCR results on this specimen are available  approximately 3 hours after the Gram stain result.***  Gram stain, PCR, and/or culture results may not always  correspond due to difference in methodologies.  ************************************************************  This PCR assay was performed using [employer].  The following targets are tested for: Enterococcus,  vancomycin resistant enterococci, Listeria monocytogenes,  coagulase negative staphylococci, S. aureus,  methicillin resistant S. aureus, Streptococcus agalactiae  (Group B), S. pneumoniae, S. pyogenes (Group A),  Acinetobacter baumannii, Enterobacter cloacae, E. coli,  Klebsiella oxytoca, K. pneumoniae, Proteus sp.,  Serratia marcescens, Haemophilus influenzae,  Neisseria meningitidis, Pseudomonas aeruginosa, Candida  albicans, C. glabrata, C krusei, C parapsilosis,  C. tropicalis and the KPC resistance gene. (05-29 @ 16:22)  Culture Results:   Growth in aerobic bottle: Gram Negative Coccobacilli (05-29 @ 16:22)          Radiology:      Assessment:        Recommendations and Plan:    Pager [phone]  After 5 pm/weekends or if no response :[phone]

## 2018-05-31 LAB
-  AMIKACIN: SIGNIFICANT CHANGE UP
-  AMOXICILLIN/CLAVULANIC ACID: SIGNIFICANT CHANGE UP
-  AMPICILLIN/SULBACTAM: SIGNIFICANT CHANGE UP
-  AMPICILLIN: SIGNIFICANT CHANGE UP
-  AZTREONAM: SIGNIFICANT CHANGE UP
-  CEFAZOLIN: SIGNIFICANT CHANGE UP
-  CEFEPIME: SIGNIFICANT CHANGE UP
-  CEFOXITIN: SIGNIFICANT CHANGE UP
-  CEFTRIAXONE: SIGNIFICANT CHANGE UP
-  CIPROFLOXACIN: SIGNIFICANT CHANGE UP
-  ERTAPENEM: SIGNIFICANT CHANGE UP
-  GENTAMICIN: SIGNIFICANT CHANGE UP
-  IMIPENEM: SIGNIFICANT CHANGE UP
-  LEVOFLOXACIN: SIGNIFICANT CHANGE UP
-  MEROPENEM: SIGNIFICANT CHANGE UP
-  NITROFURANTOIN: SIGNIFICANT CHANGE UP
-  PIPERACILLIN/TAZOBACTAM: SIGNIFICANT CHANGE UP
-  TIGECYCLINE: SIGNIFICANT CHANGE UP
-  TOBRAMYCIN: SIGNIFICANT CHANGE UP
-  TRIMETHOPRIM/SULFAMETHOXAZOLE: SIGNIFICANT CHANGE UP
ALBUMIN SERPL ELPH-MCNC: 3.1 G/DL — LOW (ref 3.3–5)
ALP SERPL-CCNC: 115 U/L — SIGNIFICANT CHANGE UP (ref 40–120)
ALT FLD-CCNC: 24 U/L — SIGNIFICANT CHANGE UP (ref 10–45)
ANION GAP SERPL CALC-SCNC: 13 MMOL/L — SIGNIFICANT CHANGE UP (ref 5–17)
AST SERPL-CCNC: 25 U/L — SIGNIFICANT CHANGE UP (ref 10–40)
BILIRUB SERPL-MCNC: 1 MG/DL — SIGNIFICANT CHANGE UP (ref 0.2–1.2)
BUN SERPL-MCNC: 34 MG/DL — HIGH (ref 7–23)
CALCIUM SERPL-MCNC: 9.2 MG/DL — SIGNIFICANT CHANGE UP (ref 8.4–10.5)
CHLORIDE SERPL-SCNC: 100 MMOL/L — SIGNIFICANT CHANGE UP (ref 96–108)
CO2 SERPL-SCNC: 24 MMOL/L — SIGNIFICANT CHANGE UP (ref 22–31)
CREAT SERPL-MCNC: 0.99 MG/DL — SIGNIFICANT CHANGE UP (ref 0.5–1.3)
CULTURE RESULTS: SIGNIFICANT CHANGE UP
GLUCOSE BLDC GLUCOMTR-MCNC: 119 MG/DL — HIGH (ref 70–99)
GLUCOSE BLDC GLUCOMTR-MCNC: 121 MG/DL — HIGH (ref 70–99)
GLUCOSE BLDC GLUCOMTR-MCNC: 139 MG/DL — HIGH (ref 70–99)
GLUCOSE BLDC GLUCOMTR-MCNC: 140 MG/DL — HIGH (ref 70–99)
GLUCOSE BLDC GLUCOMTR-MCNC: 148 MG/DL — HIGH (ref 70–99)
GLUCOSE SERPL-MCNC: 119 MG/DL — HIGH (ref 70–99)
HCT VFR BLD CALC: 35.8 % — SIGNIFICANT CHANGE UP (ref 34.5–45)
HGB BLD-MCNC: 11 G/DL — LOW (ref 11.5–15.5)
LACTATE SERPL-SCNC: 1.2 MMOL/L — SIGNIFICANT CHANGE UP (ref 0.7–2)
MAGNESIUM SERPL-MCNC: 1.9 MG/DL — SIGNIFICANT CHANGE UP (ref 1.6–2.6)
MCHC RBC-ENTMCNC: 28.8 PG — SIGNIFICANT CHANGE UP (ref 27–34)
MCHC RBC-ENTMCNC: 30.8 GM/DL — LOW (ref 32–36)
MCV RBC AUTO: 93.5 FL — SIGNIFICANT CHANGE UP (ref 80–100)
METHOD TYPE: SIGNIFICANT CHANGE UP
ORGANISM # SPEC MICROSCOPIC CNT: SIGNIFICANT CHANGE UP
ORGANISM # SPEC MICROSCOPIC CNT: SIGNIFICANT CHANGE UP
PHOSPHATE SERPL-MCNC: 2.6 MG/DL — SIGNIFICANT CHANGE UP (ref 2.5–4.5)
PLATELET # BLD AUTO: 182 K/UL — SIGNIFICANT CHANGE UP (ref 150–400)
POTASSIUM SERPL-MCNC: 3.8 MMOL/L — SIGNIFICANT CHANGE UP (ref 3.5–5.3)
POTASSIUM SERPL-SCNC: 3.8 MMOL/L — SIGNIFICANT CHANGE UP (ref 3.5–5.3)
PROT SERPL-MCNC: 6.5 G/DL — SIGNIFICANT CHANGE UP (ref 6–8.3)
RBC # BLD: 3.83 M/UL — SIGNIFICANT CHANGE UP (ref 3.8–5.2)
RBC # FLD: 12.4 % — SIGNIFICANT CHANGE UP (ref 10.3–14.5)
SODIUM SERPL-SCNC: 137 MMOL/L — SIGNIFICANT CHANGE UP (ref 135–145)
SPECIMEN SOURCE: SIGNIFICANT CHANGE UP
WBC # BLD: 13.4 K/UL — HIGH (ref 3.8–10.5)
WBC # FLD AUTO: 13.4 K/UL — HIGH (ref 3.8–10.5)

## 2018-05-31 PROCEDURE — 99233 SBSQ HOSP IP/OBS HIGH 50: CPT | Mod: GC

## 2018-05-31 PROCEDURE — 93306 TTE W/DOPPLER COMPLETE: CPT | Mod: 26

## 2018-05-31 PROCEDURE — 99232 SBSQ HOSP IP/OBS MODERATE 35: CPT | Mod: GC

## 2018-05-31 RX ORDER — CEFTRIAXONE 500 MG/1
2 INJECTION, POWDER, FOR SOLUTION INTRAMUSCULAR; INTRAVENOUS EVERY 24 HOURS
Qty: 0 | Refills: 0 | Status: DISCONTINUED | OUTPATIENT
Start: 2018-06-01 | End: 2018-06-01

## 2018-05-31 RX ORDER — CEFTRIAXONE 500 MG/1
1 INJECTION, POWDER, FOR SOLUTION INTRAMUSCULAR; INTRAVENOUS ONCE
Qty: 0 | Refills: 0 | Status: COMPLETED | OUTPATIENT
Start: 2018-05-31 | End: 2018-05-31

## 2018-05-31 RX ORDER — NYSTATIN CREAM 100000 [USP'U]/G
1 CREAM TOPICAL
Qty: 0 | Refills: 0 | Status: DISCONTINUED | OUTPATIENT
Start: 2018-05-31 | End: 2018-06-06

## 2018-05-31 RX ORDER — CEFTRIAXONE 500 MG/1
1 INJECTION, POWDER, FOR SOLUTION INTRAMUSCULAR; INTRAVENOUS ONCE
Qty: 0 | Refills: 0 | Status: DISCONTINUED | OUTPATIENT
Start: 2018-05-31 | End: 2018-05-31

## 2018-05-31 RX ADMIN — Medication 25 MILLIGRAM(S): at 05:28

## 2018-05-31 RX ADMIN — GABAPENTIN 100 MILLIGRAM(S): 400 CAPSULE ORAL at 21:52

## 2018-05-31 RX ADMIN — SERTRALINE 12.5 MILLIGRAM(S): 25 TABLET, FILM COATED ORAL at 12:27

## 2018-05-31 RX ADMIN — Medication 81 MILLIGRAM(S): at 12:28

## 2018-05-31 RX ADMIN — HEPARIN SODIUM 5000 UNIT(S): 5000 INJECTION INTRAVENOUS; SUBCUTANEOUS at 05:25

## 2018-05-31 RX ADMIN — PREGABALIN 100 MICROGRAM(S): 225 CAPSULE ORAL at 16:47

## 2018-05-31 RX ADMIN — Medication 100 MILLIGRAM(S): at 16:47

## 2018-05-31 RX ADMIN — CEFTRIAXONE 100 GRAM(S): 500 INJECTION, POWDER, FOR SOLUTION INTRAMUSCULAR; INTRAVENOUS at 16:48

## 2018-05-31 RX ADMIN — CEFTRIAXONE 100 GRAM(S): 500 INJECTION, POWDER, FOR SOLUTION INTRAMUSCULAR; INTRAVENOUS at 12:28

## 2018-05-31 RX ADMIN — PANTOPRAZOLE SODIUM 40 MILLIGRAM(S): 20 TABLET, DELAYED RELEASE ORAL at 05:27

## 2018-05-31 RX ADMIN — GABAPENTIN 100 MILLIGRAM(S): 400 CAPSULE ORAL at 05:26

## 2018-05-31 RX ADMIN — NYSTATIN CREAM 1 APPLICATION(S): 100000 CREAM TOPICAL at 16:48

## 2018-05-31 RX ADMIN — GABAPENTIN 100 MILLIGRAM(S): 400 CAPSULE ORAL at 12:28

## 2018-05-31 RX ADMIN — Medication 15 MILLIGRAM(S): at 05:25

## 2018-05-31 RX ADMIN — Medication 15 MILLIGRAM(S): at 16:47

## 2018-05-31 RX ADMIN — HEPARIN SODIUM 5000 UNIT(S): 5000 INJECTION INTRAVENOUS; SUBCUTANEOUS at 12:28

## 2018-05-31 RX ADMIN — SENNA PLUS 2 TABLET(S): 8.6 TABLET ORAL at 21:52

## 2018-05-31 RX ADMIN — Medication 3 MILLIGRAM(S): at 21:52

## 2018-05-31 RX ADMIN — Medication 100 MILLIGRAM(S): at 05:25

## 2018-05-31 RX ADMIN — HEPARIN SODIUM 5000 UNIT(S): 5000 INJECTION INTRAVENOUS; SUBCUTANEOUS at 21:52

## 2018-05-31 NOTE — PROGRESS NOTE ADULT - ASSESSMENT
86yo woman with hx dementia, severe AS, HTN, HLD, DM2 presenting from nursing home with severe sepsis likely due to pneumonia. 86yo woman with hx dementia, severe AS, HTN, HLD, DM2 presenting from nursing home with severe sepsis due to Haemophilus pneumoniae bacteremia and pneumonia.

## 2018-05-31 NOTE — PROGRESS NOTE ADULT - SUBJECTIVE AND OBJECTIVE BOX
infectious diseases progress note:    Patient is a 85y old  Female who presents with a chief complaint of Fever (29 May 2018 18:29)        Acute respiratory distress        ROS:  CONSTITUTIONAL:  Negative fever or chills, feels well, good appetite  EYES:  Negative  blurry vision or double vision  CARDIOVASCULAR:  Negative for chest pain or palpitations  RESPIRATORY:  Negative for cough, wheezing, or SOB   GASTROINTESTINAL:  Negative for nausea, vomiting, diarrhea, constipation, or abdominal pain  GENITOURINARY:  Negative frequency, urgency or dysuria  NEUROLOGIC:  No headache, confusion, dizziness, lightheadedness    Allergies    sulfa  patient stated rash (Unknown)  sulfa drugs (Unknown)    Intolerances        ANTIBIOTICS/RELEVANT:  antimicrobials  cefTRIAXone   IVPB 1 Gram(s) IV Intermittent every 24 hours  cefTRIAXone   IVPB        immunologic:    OTHER:  aspirin enteric coated 81 milliGRAM(s) Oral daily  busPIRone 15 milliGRAM(s) Oral two times a day  cyanocobalamin 100 MICROGram(s) Oral daily  dextrose 40% Gel 15 Gram(s) Oral once PRN  dextrose 5%. 1000 milliLiter(s) IV Continuous <Continuous>  dextrose 50% Injectable 12.5 Gram(s) IV Push once  dextrose 50% Injectable 25 Gram(s) IV Push once  dextrose 50% Injectable 25 Gram(s) IV Push once  docusate sodium 100 milliGRAM(s) Oral two times a day  gabapentin 100 milliGRAM(s) Oral three times a day  glucagon  Injectable 1 milliGRAM(s) IntraMuscular once PRN  heparin  Injectable 5000 Unit(s) SubCutaneous every 8 hours  insulin lispro (HumaLOG) corrective regimen sliding scale   SubCutaneous every 6 hours  melatonin 3 milliGRAM(s) Oral at bedtime  metoprolol succinate ER 25 milliGRAM(s) Oral daily  pantoprazole  Injectable 40 milliGRAM(s) IV Push daily  senna 2 Tablet(s) Oral at bedtime  sertraline 12.5 milliGRAM(s) Oral daily      Objective:  Vital Signs Last 24 Hrs  T(C): 37.7 (31 May 2018 05:18), Max: 37.7 (31 May 2018 05:18)  T(F): 99.9 (31 May 2018 05:18), Max: 99.9 (31 May 2018 05:18)  HR: 92 (31 May 2018 05:47) (71 - 94)  BP: 149/74 (31 May 2018 05:18) (111/56 - 149/74)  BP(mean): --  RR: 18 (31 May 2018 05:18) (18 - 18)  SpO2: 97% (31 May 2018 05:47) (95% - 98%)    PHYSICAL EXAM:   well nourished--no acute distress  Eyes:DARREN, EOMI  Ear/Nose/Throat: no oral lesion, no sinus tenderness on percussion	  Neck:no JVD, no lymphadenopathy, supple  Respiratory: CTA sharon  Cardiovascular: S1S2 RRR, no murmurs  Gastrointestinal:soft, (+) BS, no HSM  Extremities:no e/e/c        LABS:                        12.4   15.4  )-----------( 159      ( 30 May 2018 06:38 )             37.8         137  |  100  |  34<H>  ----------------------------<  119<H>  3.8   |  24  |  0.99    Ca    9.2      31 May 2018 07:07  Phos  2.6       Mg     1.9         TPro  6.5  /  Alb  3.1<L>  /  TBili  1.0  /  DBili  x   /  AST  25  /  ALT  24  /  AlkPhos  115      PT/INR - ( 29 May 2018 14:13 )   PT: 13.7 sec;   INR: 1.26 ratio         PTT - ( 29 May 2018 14:13 )  PTT:28.3 sec  Urinalysis Basic - ( 29 May 2018 14:30 )    Color: PL Yellow / Appearance: Clear / S.008 / pH: x  Gluc: x / Ketone: Negative  / Bili: Negative / Urobili: Negative   Blood: x / Protein: Trace / Nitrite: Negative   Leuk Esterase: Small / RBC: 0-2 /HPF / WBC 6-10 /HPF   Sq Epi: x / Non Sq Epi: OCC /HPF / Bacteria: x          MICROBIOLOGY:    RECENT CULTURES:   @ 16:32 .Urine Catheterized                10,000 - 49,000 CFU/mL Escherichia coli     @ 16:22 .Blood Blood   PCR    Growth in aerobic bottle: Gram Negative Coccobacilli  Growth in anaerobic bottle: Gram Negative Coccobacilli  Gram Positive Cocci in Pairs and Chains    Blood Culture PCR  Blood Culture PCR  Blood Culture PCR     Growth in aerobic bottle: Gram Negative Coccobacilli  "Due to technical problems, Proteus sp. will Not be reported as part of  the BCID panel until further notice"  ***Blood Panel PCR results on this specimen are available  approximately 3 hours after the Gram stain result.***  Gram stain, PCR, and/or culture results may not always  correspond due to difference in methodologies.  ************************************************************  This PCR assay was performed using [employer].  The following targets are tested for: Enterococcus,  vancomycin resistant enterococci, Listeria monocytogenes,  coagulase negative staphylococci, S. aureus,  methicillin resistant S. aureus, Streptococcus agalactiae  (Group B), S. pneumoniae, S. pyogenes (Group A),  Acinetobacter baumannii, Enterobacter cloacae, E. coli,  Klebsiella oxytoca, K. pneumoniae, Proteus sp.,  Serratia marcescens, Haemophilus influenzae,  Neisseria meningitidis, Pseudomonas aeruginosa, Candida  albicans, C. glabrata, C krusei, C parapsilosis,  C. tropicalis and the KPC resistance gene.          RESPIRATORY CULTURES:              RADIOLOGY & ADDITIONAL STUDIES:        Pager [phone]  After 5 pm/weekends or if no response :[phone]

## 2018-05-31 NOTE — PROGRESS NOTE ADULT - PROBLEM SELECTOR PLAN 4
Presented with right lobe pneumonia associated and Haemophilus influenzae with lactate 4 and hypotension. UA and RVP neg. s/p 2.5L IVF in ED  -lactate improved to 2.7  -continue CTX Resolved. Presented with right lobe pneumonia associated and Haemophilus influenzae bacteremia with lactate 4 and hypotension. UA and RVP neg. s/p 2.5L IVF in ED  -continue CTX

## 2018-05-31 NOTE — PROGRESS NOTE ADULT - PROBLEM SELECTOR PLAN 1
Haemophilus influenzae bacteremia likely from CAP  -Narrow abx to ceftriaxone  -ID consulted, appreciate recs  -f/u sensitivities, BCx tmrw AM, TTE Haemophilus influenzae bacteremia likely from CAP. 1/4 bottles growing Strep species  -Increase to CTX 2gm daily  -Appreciate ID recs  -Repeat cultures sent  -f/u sensitivities, TTE Haemophilus influenzae bacteremia likely from CAP. 1/4 bottles also growing Strep species  -Increase to CTX 2gm daily   -Appreciate ID recs  -Repeat cultures sent  -f/u sensitivities, TTE

## 2018-05-31 NOTE — PROGRESS NOTE ADULT - ASSESSMENT
87 yr old from nursing home with confusion sob dementia admitted with right lower lobe infiltrate and blood cultures positive for H. flu.   Seems to be improving    Bacteremic H flu pneumonia    agree with ceftriaxone   will be able to transition to po antibiotics in  several day     would check immunoglobulins.  follow up bc pending

## 2018-05-31 NOTE — PROGRESS NOTE ADULT - PROBLEM SELECTOR PLAN 9
Not a TAVR candidate, last in 2012 with hyperdynamic LV func, mild AS. Outpatient follow up  -Restart Toprol 25  -f/u TTE Not a TAVR candidate, last in 2012 with hyperdynamic LV func, mild AS. Outpatient follow up  -Continue Toprol 25  -f/u TTE

## 2018-05-31 NOTE — PROGRESS NOTE ADULT - PROBLEM SELECTOR PLAN 6
Not on home diabetes medication. A1c 6.1  -low ISS  -if FS stable while eating, consider discontinuing FS checks Not on home diabetes medication. A1c 6.1  -FS have been stable, d/c finger sticks

## 2018-05-31 NOTE — PROGRESS NOTE ADULT - PROBLEM SELECTOR PLAN 8
Lives at nursing home since 1/2018. Oriented x 2. QTc 471 (5/29)  -Will obtain baseline mental status from nursing home  -Continue quetiapine and Buspar Lives at nursing home since 1/2018. Oriented x 2. QTc 471 (5/29). Per son, patient is at baseline mental status  -Continue quetiapine and Buspar

## 2018-05-31 NOTE — PROVIDER CONTACT NOTE (OTHER) - RECOMMENDATIONS
Assess patient. Review patient's orders and lab results.
Assess patient. Review patient's lab results and orders.
Assess patient. Review patient's orders and lab results.
Assess patient. Review patient's orders and lab results.

## 2018-05-31 NOTE — PROGRESS NOTE ADULT - ATTENDING COMMENTS
Patient interviewed and examined.  Chart reviewed and note edited where appropriate.  Case discussed with fellow.  Agree w/ Assessment and Plan as outlined.    Nico Kee MD [facility]  Spectra:  [phone]  Office: [phone]

## 2018-05-31 NOTE — PROGRESS NOTE ADULT - PROBLEM SELECTOR PLAN 2
Round to have RLL PNA associated with R pleural effusion concerning for aspiration PNA leading to acute hypoxic respiratory failure requiring BIPAP, improved to nasal cannula 6L. Aspiration possibly related to progessiva dementia vs lethargic due to sepsis. RVP neg  -Continue ceftriaxone  -Titrate oxygen to maintain SpO2 >92%, currently on 6L NC  -Monitor vital signs q4h  -f/u BCx and UCx from nursing home (likely more accurate as drawn before abx) Round to have RLL PNA associated with R pleural effusion concerning for aspiration PNA leading to acute hypoxic respiratory failure requiring BIPAP, improved to nasal cannula 6L. Aspiration possibly related to progessiva dementia vs lethargic due to sepsis. RVP neg  -Continue ceftriaxone  -Titrate oxygen to maintain SpO2 >92%, currently on 4L NC

## 2018-05-31 NOTE — PROGRESS NOTE ADULT - ASSESSMENT
85F with dementia, severe AS, DM, HTN, HLD living in a nursing home presents with fever and respiratory distress. Troponin elevated to 0.12. Nonspecific EKG changes.  Type II NSTEMI in setting of sepsis.    #Type II NSTEMI  -- continue asa/statin  -- continue metoprolol  -- resume statin  -- TTE    #Severe AS  -- outpatient follow up. Not a candidate for TAVR.    #HTN  -- continue metoprolol    Salazar Webb MD 85F with dementia, severe AS, DM, HTN, HLD living in a nursing home presents with fever and respiratory distress. Troponin elevated to 0.12. Nonspecific EKG changes.  Type II NSTEMI in setting of sepsis.    #Type II NSTEMI  -- continue asa/statin  -- continue metoprolol  -- resume statin  -- TTE  -- may require subsequent CALVIN for endocarditis eval    #Severe AS  -- outpatient follow up. Not a candidate for TAVR.    #HTN  -- continue metoprolol    Salazar Webb MD 85F with dementia, severe AS, DM, HTN, HLD living in a nursing home presents with fever and respiratory distress. Troponin elevated to 0.12. Nonspecific EKG changes.  Type II NSTEMI in setting of sepsis.    #Type II NSTEMI  -- continue asa/statin  -- continue metoprolol  -- resume statin  -- TTE  -- may require subsequent CALVIN for endocarditis eval, although PNA appears to be the source of bacteremia    #Severe AS  -- outpatient follow up. Not a candidate for TAVR.    #HTN  -- continue metoprolol    Salazar Webb MD

## 2018-05-31 NOTE — PROGRESS NOTE ADULT - PROBLEM SELECTOR PLAN 5
type II NSTEMI in setting of demand ischemia from sepsis. Trop peaked 0.2, now downtrended  -Cardiology recs appreciated  -Continue ASA, restart Toprol  -Not on a home statin type II NSTEMI in setting of demand ischemia from sepsis. Trop peaked 0.2, now downtrended  -Monitor off tele  -Cardiology recs appreciated  -Continue ASA, Toprol

## 2018-05-31 NOTE — PROGRESS NOTE ADULT - SUBJECTIVE AND OBJECTIVE BOX
Cardiology Progress Note    Interval events: No acute events overnight. Patient off bipap. + blood cultures. No chest pain or SOB.    Tele: sinus 70-80s    Medications:  aspirin enteric coated 81 milliGRAM(s) Oral daily  busPIRone 15 milliGRAM(s) Oral two times a day  cefTRIAXone   IVPB 1 Gram(s) IV Intermittent every 24 hours  cefTRIAXone   IVPB      cyanocobalamin 100 MICROGram(s) Oral daily  dextrose 40% Gel 15 Gram(s) Oral once PRN  dextrose 5%. 1000 milliLiter(s) IV Continuous <Continuous>  dextrose 50% Injectable 12.5 Gram(s) IV Push once  dextrose 50% Injectable 25 Gram(s) IV Push once  dextrose 50% Injectable 25 Gram(s) IV Push once  docusate sodium 100 milliGRAM(s) Oral two times a day  gabapentin 100 milliGRAM(s) Oral three times a day  glucagon  Injectable 1 milliGRAM(s) IntraMuscular once PRN  heparin  Injectable 5000 Unit(s) SubCutaneous every 8 hours  insulin lispro (HumaLOG) corrective regimen sliding scale   SubCutaneous every 6 hours  melatonin 3 milliGRAM(s) Oral at bedtime  metoprolol succinate ER 25 milliGRAM(s) Oral daily  pantoprazole  Injectable 40 milliGRAM(s) IV Push daily  senna 2 Tablet(s) Oral at bedtime  sertraline 12.5 milliGRAM(s) Oral daily      Review of Systems:  Constitutional: [ ] Fever [ ] Chills [ ] Fatigue [ ] Weight change   HEENT: [ ] Blurred vision [ ] Eye Pain [ ] Headache [ ] Runny nose [ ] Sore Throat   Respiratory: [ ] Cough [ ] Wheezing [ ] Shortness of breath  Cardiovascular: [ ] Chest Pain [ ] Palpitations [ ] ROMERO [ ] PND [ ] Orthopnea  Gastrointestinal: [ ] Abdominal Pain [ ] Diarrhea [ ] Constipation [ ] Hemorrhoids [ ] Nausea [ ] Vomiting  Genitourinary: [ ] Nocturia [ ] Dysuria [ ] Incontinence  Extremities: [ ] Swelling [ ] Joint Pain  Neurologic: [ ] Focal deficit [ ] Paresthesias [ ] Syncope  Lymphatic: [ ] Swelling [ ] Lymphadenopathy   Skin: [ ] Rash [ ] Ecchymoses [ ] Wounds [ ] Lesions  Psychiatry: [ ] Depression [ ] Suicidal/Homicidal Ideation [ ] Anxiety [ ] Sleep Disturbances  [x] 10 point review of systems is otherwise negative except as mentioned above            [ ]Unable to obtain    Vitals:  T(C): 37.7 (18 @ 05:18), Max: 37.7 (18 @ 05:18)  HR: 94 (18 @ 05:18) (71 - 94)  BP: 149/74 (18 @ 05:18) (111/56 - 149/74)  BP(mean): --  RR: 18 (18 @ 05:18) (18 - 18)  SpO2: 98% (18 @ 21:18) (95% - 98%)  Wt(kg): --  Daily     Daily Weight in k.4 (31 May 2018 05:18)  I&O's Summary    30 May 2018 07:01  -  31 May 2018 05:22  --------------------------------------------------------  IN: 770 mL / OUT: 925 mL / NET: -155 mL        Physical Exam:  Appearance: NAD  Eyes: PERRL,  HENT: Normal oral [place], NC/AT  Cardiovascular: normal S1 and absent S2, rrr, III/VI crescendo decrescendo murmur mid peaking at aortic area radiating to carotids and subclavians.  Similar mixed frequency/yosef mat apex, no edema, normal JVP  Respiratory: Clear to auscultation bilaterally  Gastrointestinal: Soft, non-tender, non-distended, BS+  Lymphatic: No lymphadenopathy  Psychiatry: AAOx3, mood & affect appropriate  Skin: No rashes, no ecchymoses, no cyanosis    Labs:                        12.4   15.4  )-----------( 159      ( 30 May 2018 06:38 )             37.8     05-30    133<L>  |  99  |  31<H>  ----------------------------<  111<H>  4.6   |  18<L>  |  1.02    Ca    9.0      30 May 2018 06:38  Phos  2.7     05-30  Mg     1.7     05-30    TPro  6.6  /  Alb  3.3  /  TBili  1.5<H>  /  DBili  x   /  AST  29  /  ALT  34  /  AlkPhos  129<H>  05    PT/INR - ( 29 May 2018 14:13 )   PT: 13.7 sec;   INR: 1.26 ratio         PTT - ( 29 May 2018 14:13 )  PTT:28.3 sec  CARDIAC MARKERS ( 30 May 2018 06:38 )  x     / 0.16 ng/mL / 177 U/L / x     / 6.0 ng/mL  CARDIAC MARKERS ( 29 May 2018 23:39 )  x     / 0.20 ng/mL / 147 U/L / x     / 6.5 ng/mL  CARDIAC MARKERS ( 29 May 2018 14:13 )  x     / 0.12 ng/mL / x     / x     / x              Hemoglobin A1C, Whole Blood: 6.1 % ( @ 08:04)      New results/imaging:

## 2018-05-31 NOTE — PROGRESS NOTE ADULT - SUBJECTIVE AND OBJECTIVE BOX
Internal Medicine Progress Note    Cindy Madrid, PGY1  Internal Medicine, team 1  Pager [phone] / [phone]  After 7PM on weekdays and 12PM on weekends, please page #[phone]    Patient is a 85y old  Female who presents with a chief complaint of Fever (29 May 2018 18:29)      SUBJECTIVE / OVERNIGHT EVENTS: Spoke with pt's son yesterday at bedside.    MEDICATIONS  (STANDING):  aspirin enteric coated 81 milliGRAM(s) Oral daily  busPIRone 15 milliGRAM(s) Oral two times a day  cefTRIAXone   IVPB 1 Gram(s) IV Intermittent every 24 hours  cefTRIAXone   IVPB      cyanocobalamin 100 MICROGram(s) Oral daily  dextrose 5%. 1000 milliLiter(s) (50 mL/Hr) IV Continuous <Continuous>  dextrose 50% Injectable 12.5 Gram(s) IV Push once  dextrose 50% Injectable 25 Gram(s) IV Push once  dextrose 50% Injectable 25 Gram(s) IV Push once  docusate sodium 100 milliGRAM(s) Oral two times a day  gabapentin 100 milliGRAM(s) Oral three times a day  heparin  Injectable 5000 Unit(s) SubCutaneous every 8 hours  insulin lispro (HumaLOG) corrective regimen sliding scale   SubCutaneous every 6 hours  melatonin 3 milliGRAM(s) Oral at bedtime  metoprolol succinate ER 25 milliGRAM(s) Oral daily  pantoprazole  Injectable 40 milliGRAM(s) IV Push daily  senna 2 Tablet(s) Oral at bedtime  sertraline 12.5 milliGRAM(s) Oral daily    MEDICATIONS  (PRN):  dextrose 40% Gel 15 Gram(s) Oral once PRN Blood Glucose LESS THAN 70 milliGRAM(s)/deciliter  glucagon  Injectable 1 milliGRAM(s) IntraMuscular once PRN Glucose LESS THAN 70 milligrams/deciliter      Vital Signs Last 24 Hrs  T(C): 37.7 (31 May 2018 05:18), Max: 37.7 (31 May 2018 05:18)  T(F): 99.9 (31 May 2018 05:18), Max: 99.9 (31 May 2018 05:18)  HR: 92 (31 May 2018 05:47) (71 - 94)  BP: 149/74 (31 May 2018 05:18) (111/56 - 149/74)  BP(mean): --  RR: 18 (31 May 2018 05:18) (18 - 18)  SpO2: 97% (31 May 2018 05:47) (95% - 98%)    CAPILLARY BLOOD GLUCOSE      POCT Blood Glucose.: 140 mg/dL (31 May 2018 06:38)  POCT Blood Glucose.: 119 mg/dL (30 May 2018 23:54)  POCT Blood Glucose.: 135 mg/dL (30 May 2018 17:55)  POCT Blood Glucose.: 114 mg/dL (30 May 2018 12:54)  POCT Blood Glucose.: 116 mg/dL (30 May 2018 09:25)      I&O's Summary    30 May 2018 07:01  -  31 May 2018 07:00  --------------------------------------------------------  IN: 950 mL / OUT: 1425 mL / NET: -475 mL      PHYSICAL EXAM  GENERAL: NAD  HEAD:  Atraumatic  EYES: EOMI, PERRLA, conjunctiva and sclera clear  NECK: Supple, No JVD, no cervical LAD  CHEST/LUNG: Clear to auscultation bilaterally; No wheeze, respirations nonlabored  HEART: Regular rate and rhythm; No murmurs, rubs, or gallops  ABDOMEN: Soft, Nontender, Nondistended; Bowel sounds present  EXTREMITIES:  2+ Peripheral Pulses, No clubbing, cyanosis, or edema  NEURO/PSYCH: AAOx2 (self, hospital), nonfocal  SKIN: No rashes or lesions      LABS:                                     12.4   15.4  )-----------( 159      ( 30 May 2018 06:38 )             37.8     CBC Full  -  ( 30 May 2018 06:38 )  WBC Count : 15.4 K/uL  Hemoglobin : 12.4 g/dL  Hematocrit : 37.8 %  Platelet Count - Automated : 159 K/uL  Mean Cell Volume : 94.1 fl  Mean Cell Hemoglobin : 30.8 pg  Mean Cell Hemoglobin Concentration : 32.7 gm/dL  Auto Neutrophil # : x  Auto Lymphocyte # : x  Auto Monocyte # : x  Auto Eosinophil # : x  Auto Basophil # : x  Auto Neutrophil % : x  Auto Lymphocyte % : x  Auto Monocyte % : x  Auto Eosinophil % : x  Auto Basophil % : x    05-30    133<L>  |  99  |  31<H>  ----------------------------<  111<H>  4.6   |  18<L>  |  1.02    Ca    9.0      30 May 2018 06:38  Phos  2.7     05-30  Mg     1.7     05-30    TPro  6.6  /  Alb  3.3  /  TBili  1.5<H>  /  DBili  x   /  AST  29  /  ALT  34  /  AlkPhos  129<H>  05-29    Creatinine Trend: 1.02<--, 1.07<--  LIVER FUNCTIONS - ( 29 May 2018 14:13 )  Alb: 3.3 g/dL / Pro: 6.6 g/dL / ALK PHOS: 129 U/L / ALT: 34 U/L / AST: 29 U/L / GGT: x           PT/INR - ( 29 May 2018 14:13 )   PT: 13.7 sec;   INR: 1.26 ratio         PTT - ( 29 May 2018 14:13 )  PTT:28.3 sec  CARDIAC MARKERS ( 30 May 2018 06:38 )  x     / 0.16 ng/mL / 177 U/L / x     / 6.0 ng/mL  CARDIAC MARKERS ( 29 May 2018 23:39 )  x     / 0.20 ng/mL / 147 U/L / x     / 6.5 ng/mL  CARDIAC MARKERS ( 29 May 2018 14:13 )  x     / 0.12 ng/mL / x     / x     / x            Urinalysis Basic - ( 29 May 2018 14:30 )    Color: PL Yellow / Appearance: Clear / S.008 / pH: x  Gluc: x / Ketone: Negative  / Bili: Negative / Urobili: Negative   Blood: x / Protein: Trace / Nitrite: Negative   Leuk Esterase: Small / RBC: 0-2 /HPF / WBC 6-10 /HPF   Sq Epi: x / Non Sq Epi: OCC /HPF / Bacteria: x        MICROBIOLOGY:    Culture - Urine (collected 29 May 2018 16:32)  Source: .Urine Catheterized  Preliminary Report (30 May 2018 18:35):    10,000 - 49,000 CFU/mL Escherichia coli    Culture - Blood (collected 29 May 2018 16:22)  Source: .Blood Blood  Gram Stain (30 May 2018 14:16):    Growth in aerobic bottle: Gram Negative Coccobacilli    Growth in anaerobic bottle: Gram Negative Coccobacilli  Preliminary Report (30 May 2018 14:16):    Growth in aerobic bottle: Gram Negative Coccobacilli    Growth in anaerobic bottle: Gram Negative Coccobacilli    Culture - Blood (collected 29 May 2018 16:22)  Source: .Blood Blood  Gram Stain (30 May 2018 16:50):    Growth in aerobic bottle: Gram Negative Coccobacilli    Growth in anaerobic bottle: Gram Negative Coccobacilli    Gram Positive Cocci in Pairs and Chains  Preliminary Report (30 May 2018 08:34):    Growth in aerobic bottle: Gram Negative Coccobacilli    "Due to technical problems, Proteus sp. will Not be reported as part of    the BCID panel until further notice"    ***Blood Panel PCR results on this specimen are available    approximately 3 hours after the Gram stain result.***    Gram stain, PCR, and/or culture results may not always    correspond due to difference in methodologies.    ************************************************************    This PCR assay was performed using [employer].    The following targets are tested for: Enterococcus,    vancomycin resistant enterococci, Listeria monocytogenes,    coagulase negative staphylococci, S. aureus,    methicillin resistant S. aureus, Streptococcus agalactiae    (Group B), S. pneumoniae, S. pyogenes (Group A),    Acinetobacter baumannii, Enterobacter cloacae, E. coli,    Klebsiella oxytoca, K. pneumoniae, Proteus sp.,    Serratia marcescens, Haemophilus influenzae,    Neisseria meningitidis, Pseudomonas aeruginosa, Candida    albicans, C. glabrata, C krusei, C parapsilosis,    C. tropicalis and the KPC resistance gene.  Organism: Blood Culture PCR  Blood Culture PCR (30 May 2018 18:25)  Organism: Blood Culture PCR (30 May 2018 18:25)  Organism: Blood Culture PCR (30 May 2018 10:17)      RADIOLOGY & ADDITIONAL TESTS:     CONSULTS: cards, ID Internal Medicine Progress Note    Cindy Madrid, PGY1  Internal Medicine, team 1  Pager [phone] / [phone]  After 7PM on weekdays and 12PM on weekends, please page #[phone]    Patient is a 85y old  Female who presents with a chief complaint of Fever (29 May 2018 18:29)      SUBJECTIVE / OVERNIGHT EVENTS: Spoke with pt's son yesterday at bedside yesterday. Pt growing     MEDICATIONS  (STANDING):  aspirin enteric coated 81 milliGRAM(s) Oral daily  busPIRone 15 milliGRAM(s) Oral two times a day  cefTRIAXone   IVPB 1 Gram(s) IV Intermittent every 24 hours  cefTRIAXone   IVPB      cyanocobalamin 100 MICROGram(s) Oral daily  dextrose 5%. 1000 milliLiter(s) (50 mL/Hr) IV Continuous <Continuous>  dextrose 50% Injectable 12.5 Gram(s) IV Push once  dextrose 50% Injectable 25 Gram(s) IV Push once  dextrose 50% Injectable 25 Gram(s) IV Push once  docusate sodium 100 milliGRAM(s) Oral two times a day  gabapentin 100 milliGRAM(s) Oral three times a day  heparin  Injectable 5000 Unit(s) SubCutaneous every 8 hours  insulin lispro (HumaLOG) corrective regimen sliding scale   SubCutaneous every 6 hours  melatonin 3 milliGRAM(s) Oral at bedtime  metoprolol succinate ER 25 milliGRAM(s) Oral daily  pantoprazole  Injectable 40 milliGRAM(s) IV Push daily  senna 2 Tablet(s) Oral at bedtime  sertraline 12.5 milliGRAM(s) Oral daily    MEDICATIONS  (PRN):  dextrose 40% Gel 15 Gram(s) Oral once PRN Blood Glucose LESS THAN 70 milliGRAM(s)/deciliter  glucagon  Injectable 1 milliGRAM(s) IntraMuscular once PRN Glucose LESS THAN 70 milligrams/deciliter      Vital Signs Last 24 Hrs  T(C): 37.7 (31 May 2018 05:18), Max: 37.7 (31 May 2018 05:18)  T(F): 99.9 (31 May 2018 05:18), Max: 99.9 (31 May 2018 05:18)  HR: 92 (31 May 2018 05:47) (71 - 94)  BP: 149/74 (31 May 2018 05:18) (111/56 - 149/74)  BP(mean): --  RR: 18 (31 May 2018 05:18) (18 - 18)  SpO2: 97% (31 May 2018 05:47) (95% - 98%)    CAPILLARY BLOOD GLUCOSE      POCT Blood Glucose.: 140 mg/dL (31 May 2018 06:38)  POCT Blood Glucose.: 119 mg/dL (30 May 2018 23:54)  POCT Blood Glucose.: 135 mg/dL (30 May 2018 17:55)  POCT Blood Glucose.: 114 mg/dL (30 May 2018 12:54)  POCT Blood Glucose.: 116 mg/dL (30 May 2018 09:25)      I&O's Summary    30 May 2018 07:01  -  31 May 2018 07:00  --------------------------------------------------------  IN: 950 mL / OUT: 1425 mL / NET: -475 mL      PHYSICAL EXAM  GENERAL: NAD  HEAD:  Atraumatic  EYES: EOMI, PERRLA, conjunctiva and sclera clear  NECK: Supple, No JVD, no cervical LAD  CHEST/LUNG: Clear to auscultation bilaterally; No wheeze, respirations nonlabored  HEART: Regular rate and rhythm; No murmurs, rubs, or gallops  ABDOMEN: Soft, Nontender, Nondistended; Bowel sounds present  EXTREMITIES:  2+ Peripheral Pulses, No clubbing, cyanosis, or edema  NEURO/PSYCH: AAOx2 (self, hospital), nonfocal  SKIN: No rashes or lesions      LABS:                         11.0   13.4  )-----------( 182      ( 31 May 2018 07:14 )             35.8     CBC Full  -  ( 31 May 2018 07:14 )  WBC Count : 13.4 K/uL  Hemoglobin : 11.0 g/dL  Hematocrit : 35.8 %  Platelet Count - Automated : 182 K/uL  Mean Cell Volume : 93.5 fl  Mean Cell Hemoglobin : 28.8 pg  Mean Cell Hemoglobin Concentration : 30.8 gm/dL  Auto Neutrophil # : x  Auto Lymphocyte # : x  Auto Monocyte # : x  Auto Eosinophil # : x  Auto Basophil # : x  Auto Neutrophil % : x  Auto Lymphocyte % : x  Auto Monocyte % : x  Auto Eosinophil % : x  Auto Basophil % : x    05-31    137  |  100  |  34<H>  ----------------------------<  119<H>  3.8   |  24  |  0.99    Ca    9.2      31 May 2018 07:07  Phos  2.6     05-31  Mg     1.9     05-31    TPro  6.5  /  Alb  3.1<L>  /  TBili  1.0  /  DBili  x   /  AST  25  /  ALT  24  /  AlkPhos  115  05-31    Creatinine Trend: 0.99<--, 1.02<--, 1.07<--  LIVER FUNCTIONS - ( 31 May 2018 07:07 )  Alb: 3.1 g/dL / Pro: 6.5 g/dL / ALK PHOS: 115 U/L / ALT: 24 U/L / AST: 25 U/L / GGT: x             CARDIAC MARKERS ( 30 May 2018 06:38 )  x     / 0.16 ng/mL / 177 U/L / x     / 6.0 ng/mL  CARDIAC MARKERS ( 29 May 2018 23:39 )  x     / 0.20 ng/mL / 147 U/L / x     / 6.5 ng/mL      MICROBIOLOGY:  Culture - Urine (collected 29 May 2018 16:32)  Source: .Urine Catheterized  Preliminary Report (30 May 2018 18:35):    10,000 - 49,000 CFU/mL Escherichia coli    Culture - Blood (collected 29 May 2018 16:22)  Source: .Blood Blood  Gram Stain (30 May 2018 14:16):    Growth in aerobic bottle: Gram Negative Coccobacilli    Growth in anaerobic bottle: Gram Negative Coccobacilli  Preliminary Report (30 May 2018 14:16):    Growth in aerobic bottle: Gram Negative Coccobacilli    Growth in anaerobic bottle: Gram Negative Coccobacilli    Culture - Blood (collected 29 May 2018 16:22)  Source: .Blood Blood  Gram Stain (30 May 2018 16:50):    Growth in aerobic bottle: Gram Negative Coccobacilli    Growth in anaerobic bottle: Gram Negative Coccobacilli    Gram Positive Cocci in Pairs and Chains  Preliminary Report (31 May 2018 12:48):    Growth in aerobic bottle: Haemophilus influenzae    See previous culture [id number]    Growth in anaerobic bottle: Alpha hemolytic strep    (not Strep. pneumoniae or Enterococcus)    Single set isolate, possible contaminant. Contact    Microbiology if susceptibility testing clinically    indicated.    "Due to technical problems, Proteus sp. will Not be reported as part of    the BCID panel until further notice"    ***Blood Panel PCR results on this specimen are available    approximately 3 hours after the Gram stain result.***    Gram stain, PCR, and/or culture results may not always    correspond due to difference in methodologies.    ************************************************************    This PCR assay was performed using [employer].    The following targets are tested for: Enterococcus,    vancomycin resistant enterococci, Listeria monocytogenes,    coagulase negative staphylococci, S. aureus,    methicillin resistant S. aureus, Streptococcus agalactiae    (Group B), S. pneumoniae, S. pyogenes (GroupA),    Acinetobacter baumannii, Enterobacter cloacae, E. coli,    Klebsiella oxytoca, K. pneumoniae, Proteus sp.,    Serratia marcescens, Haemophilus influenzae,    Neisseria meningitidis, Pseudomonas aeruginosa, Candida    albicans, C. glabrata, C krusei, C parapsilosis,    C. tropicalis and the KPC resistance gene.  Organism: Blood Culture PCR  Blood Culture PCR (30 May 2018 18:25)  Organism: Blood Culture PCR (30 May 2018 18:25)  Organism: Blood Culture PCR (30 May 2018 10:17)    RADIOLOGY & ADDITIONAL TESTS:     CONSULTS: cards, ID Internal Medicine Progress Note    Cindy Madrid, PGY1  Internal Medicine, team 1  Pager [phone] / [phone]  After 7PM on weekdays and 12PM on weekends, please page #[phone]    Patient is a 85y old  Female who presents with a chief complaint of Fever (29 May 2018 18:29)      SUBJECTIVE / OVERNIGHT EVENTS: Spoke with pt's son yesterday at bedside yesterday.   pt feeling better today- denies cp, sob, abdominal pain, N/V/D, fever.    MEDICATIONS  (STANDING):  aspirin enteric coated 81 milliGRAM(s) Oral daily  busPIRone 15 milliGRAM(s) Oral two times a day  cefTRIAXone   IVPB 1 Gram(s) IV Intermittent every 24 hours  cefTRIAXone   IVPB      cyanocobalamin 100 MICROGram(s) Oral daily  dextrose 5%. 1000 milliLiter(s) (50 mL/Hr) IV Continuous <Continuous>  dextrose 50% Injectable 12.5 Gram(s) IV Push once  dextrose 50% Injectable 25 Gram(s) IV Push once  dextrose 50% Injectable 25 Gram(s) IV Push once  docusate sodium 100 milliGRAM(s) Oral two times a day  gabapentin 100 milliGRAM(s) Oral three times a day  heparin  Injectable 5000 Unit(s) SubCutaneous every 8 hours  insulin lispro (HumaLOG) corrective regimen sliding scale   SubCutaneous every 6 hours  melatonin 3 milliGRAM(s) Oral at bedtime  metoprolol succinate ER 25 milliGRAM(s) Oral daily  pantoprazole  Injectable 40 milliGRAM(s) IV Push daily  senna 2 Tablet(s) Oral at bedtime  sertraline 12.5 milliGRAM(s) Oral daily    MEDICATIONS  (PRN):  dextrose 40% Gel 15 Gram(s) Oral once PRN Blood Glucose LESS THAN 70 milliGRAM(s)/deciliter  glucagon  Injectable 1 milliGRAM(s) IntraMuscular once PRN Glucose LESS THAN 70 milligrams/deciliter      Vital Signs Last 24 Hrs  T(C): 37.7 (31 May 2018 05:18), Max: 37.7 (31 May 2018 05:18)  T(F): 99.9 (31 May 2018 05:18), Max: 99.9 (31 May 2018 05:18)  HR: 92 (31 May 2018 05:47) (71 - 94)  BP: 149/74 (31 May 2018 05:18) (111/56 - 149/74)  BP(mean): --  RR: 18 (31 May 2018 05:18) (18 - 18)  SpO2: 97% (31 May 2018 05:47) (95% - 98%)    CAPILLARY BLOOD GLUCOSE      POCT Blood Glucose.: 140 mg/dL (31 May 2018 06:38)  POCT Blood Glucose.: 119 mg/dL (30 May 2018 23:54)  POCT Blood Glucose.: 135 mg/dL (30 May 2018 17:55)  POCT Blood Glucose.: 114 mg/dL (30 May 2018 12:54)  POCT Blood Glucose.: 116 mg/dL (30 May 2018 09:25)      I&O's Summary    30 May 2018 07:01  -  31 May 2018 07:00  --------------------------------------------------------  IN: 950 mL / OUT: 1425 mL / NET: -475 mL      PHYSICAL EXAM  GENERAL: NAD  HEAD:  Atraumatic  EYES: EOMI, PERRLA, conjunctiva and sclera clear  NECK: Supple, No JVD, no cervical LAD  CHEST/LUNG: crackles at R lung base; No wheeze, respirations nonlabored on nasal cannula  HEART: Regular rate and rhythm; No murmurs, rubs, or gallops  ABDOMEN: Soft, Nontender, Nondistended; Bowel sounds present  EXTREMITIES:  2+ Peripheral Pulses, No clubbing, cyanosis, or edema  NEURO/PSYCH: AAOx2 (self, hospital), nonfocal  SKIN: No rashes or lesions      LABS:                         11.0   13.4  )-----------( 182      ( 31 May 2018 07:14 )             35.8     CBC Full  -  ( 31 May 2018 07:14 )  WBC Count : 13.4 K/uL  Hemoglobin : 11.0 g/dL  Hematocrit : 35.8 %  Platelet Count - Automated : 182 K/uL  Mean Cell Volume : 93.5 fl  Mean Cell Hemoglobin : 28.8 pg  Mean Cell Hemoglobin Concentration : 30.8 gm/dL  Auto Neutrophil # : x  Auto Lymphocyte # : x  Auto Monocyte # : x  Auto Eosinophil # : x  Auto Basophil # : x  Auto Neutrophil % : x  Auto Lymphocyte % : x  Auto Monocyte % : x  Auto Eosinophil % : x  Auto Basophil % : x    05-31    137  |  100  |  34<H>  ----------------------------<  119<H>  3.8   |  24  |  0.99    Ca    9.2      31 May 2018 07:07  Phos  2.6     05-31  Mg     1.9     05-31    TPro  6.5  /  Alb  3.1<L>  /  TBili  1.0  /  DBili  x   /  AST  25  /  ALT  24  /  AlkPhos  115  05-31    Creatinine Trend: 0.99<--, 1.02<--, 1.07<--  LIVER FUNCTIONS - ( 31 May 2018 07:07 )  Alb: 3.1 g/dL / Pro: 6.5 g/dL / ALK PHOS: 115 U/L / ALT: 24 U/L / AST: 25 U/L / GGT: x             CARDIAC MARKERS ( 30 May 2018 06:38 )  x     / 0.16 ng/mL / 177 U/L / x     / 6.0 ng/mL  CARDIAC MARKERS ( 29 May 2018 23:39 )  x     / 0.20 ng/mL / 147 U/L / x     / 6.5 ng/mL      MICROBIOLOGY:  Culture - Urine (collected 29 May 2018 16:32)  Source: .Urine Catheterized  Preliminary Report (30 May 2018 18:35):    10,000 - 49,000 CFU/mL Escherichia coli    Culture - Blood (collected 29 May 2018 16:22)  Source: .Blood Blood  Gram Stain (30 May 2018 14:16):    Growth in aerobic bottle: Gram Negative Coccobacilli    Growth in anaerobic bottle: Gram Negative Coccobacilli  Preliminary Report (30 May 2018 14:16):    Growth in aerobic bottle: Gram Negative Coccobacilli    Growth in anaerobic bottle: Gram Negative Coccobacilli    Culture - Blood (collected 29 May 2018 16:22)  Source: .Blood Blood  Gram Stain (30 May 2018 16:50):    Growth in aerobic bottle: Gram Negative Coccobacilli    Growth in anaerobic bottle: Gram Negative Coccobacilli    Gram Positive Cocci in Pairs and Chains  Preliminary Report (31 May 2018 12:48):    Growth in aerobic bottle: Haemophilus influenzae    See previous culture [id number]    Growth in anaerobic bottle: Alpha hemolytic strep    (not Strep. pneumoniae or Enterococcus)    Single set isolate, possible contaminant. Contact    Microbiology if susceptibility testing clinically    indicated.    "Due to technical problems, Proteus sp. will Not be reported as part of    the BCID panel until further notice"    ***Blood Panel PCR results on this specimen are available    approximately 3 hours after the Gram stain result.***    Gram stain, PCR, and/or culture results may not always    correspond due to difference in methodologies.    ************************************************************    This PCR assay was performed using [employer].    The following targets are tested for: Enterococcus,    vancomycin resistant enterococci, Listeria monocytogenes,    coagulase negative staphylococci, S. aureus,    methicillin resistant S. aureus, Streptococcus agalactiae    (Group B), S. pneumoniae, S. pyogenes (GroupA),    Acinetobacter baumannii, Enterobacter cloacae, E. coli,    Klebsiella oxytoca, K. pneumoniae, Proteus sp.,    Serratia marcescens, Haemophilus influenzae,    Neisseria meningitidis, Pseudomonas aeruginosa, Candida    albicans, C. glabrata, C krusei, C parapsilosis,    C. tropicalis and the KPC resistance gene.  Organism: Blood Culture PCR  Blood Culture PCR (30 May 2018 18:25)  Organism: Blood Culture PCR (30 May 2018 18:25)  Organism: Blood Culture PCR (30 May 2018 10:17)    RADIOLOGY & ADDITIONAL TESTS:     CONSULTS: cards, ID

## 2018-05-31 NOTE — PROGRESS NOTE ADULT - ATTENDING COMMENTS
severe sepsis, acute hypoxic respiratory failure and acute encephalopathy 2/2 H. influenza CAP with bacteremia. 1/4 blood cx also growing strep species. Clinically improved today, on nasal cannula, ceftriaxone. repeat blood cx sent. check TTE in light of hx of aortic stenosis and + strep blood cx. unlikely endocarditis but will need to rule out. pt with hx of hyperdynamic LV function. appreciate cardiology recs. elevated trop likely demand ischemia. back on b blocker. continue remainder of plan per resident note . PT consult pending, plan for eventual discharge back to NH pt came from. severe sepsis, acute hypoxic respiratory failure and acute encephalopathy 2/2 H. influenza CAP with bacteremia. 1/4 blood cx also growing strep species. Clinically improved today, on nasal cannula, ceftriaxone. repeat blood cx sent. check TTE in light of hx of aortic stenosis and + strep blood cx. unlikely endocarditis but will need to rule out. pt with hx of hyperdynamic LV function. appreciate cardiology recs. elevated trop likely demand ischemia. back on b blocker. can d/c tele today. continue remainder of plan per resident note . PT consult pending, plan for eventual discharge back to NH pt came from.

## 2018-06-01 LAB
-  AMPICILLIN: SIGNIFICANT CHANGE UP
-  CEFTRIAXONE: SIGNIFICANT CHANGE UP
-  CHLORAMPHENICOL: SIGNIFICANT CHANGE UP
-  LEVOFLOXACIN: SIGNIFICANT CHANGE UP
-  MEROPENEM: SIGNIFICANT CHANGE UP
ANION GAP SERPL CALC-SCNC: 11 MMOL/L — SIGNIFICANT CHANGE UP (ref 5–17)
BUN SERPL-MCNC: 26 MG/DL — HIGH (ref 7–23)
CALCIUM SERPL-MCNC: 8.8 MG/DL — SIGNIFICANT CHANGE UP (ref 8.4–10.5)
CHLORIDE SERPL-SCNC: 99 MMOL/L — SIGNIFICANT CHANGE UP (ref 96–108)
CO2 SERPL-SCNC: 24 MMOL/L — SIGNIFICANT CHANGE UP (ref 22–31)
CREAT SERPL-MCNC: 0.8 MG/DL — SIGNIFICANT CHANGE UP (ref 0.5–1.3)
CULTURE RESULTS: SIGNIFICANT CHANGE UP
CULTURE RESULTS: SIGNIFICANT CHANGE UP
GLUCOSE SERPL-MCNC: 122 MG/DL — HIGH (ref 70–99)
HCT VFR BLD CALC: 32.2 % — LOW (ref 34.5–45)
HGB BLD-MCNC: 10.6 G/DL — LOW (ref 11.5–15.5)
IGA FLD-MCNC: 154 MG/DL — SIGNIFICANT CHANGE UP (ref 84–499)
IGG FLD-MCNC: 552 MG/DL — LOW (ref 610–1660)
IGM SERPL-MCNC: 84 MG/DL — SIGNIFICANT CHANGE UP (ref 35–242)
KAPPA LC SER QL IFE: 2.84 MG/DL — HIGH (ref 0.33–1.94)
KAPPA/LAMBDA FREE LIGHT CHAIN RATIO, SERUM: 0.84 RATIO — SIGNIFICANT CHANGE UP (ref 0.26–1.65)
LAMBDA LC SER QL IFE: 3.39 MG/DL — HIGH (ref 0.57–2.63)
MAGNESIUM SERPL-MCNC: 1.9 MG/DL — SIGNIFICANT CHANGE UP (ref 1.6–2.6)
MCHC RBC-ENTMCNC: 30.5 PG — SIGNIFICANT CHANGE UP (ref 27–34)
MCHC RBC-ENTMCNC: 33 GM/DL — SIGNIFICANT CHANGE UP (ref 32–36)
MCV RBC AUTO: 92.4 FL — SIGNIFICANT CHANGE UP (ref 80–100)
METHOD TYPE: SIGNIFICANT CHANGE UP
ORGANISM # SPEC MICROSCOPIC CNT: SIGNIFICANT CHANGE UP
PHOSPHATE SERPL-MCNC: 2.7 MG/DL — SIGNIFICANT CHANGE UP (ref 2.5–4.5)
PLATELET # BLD AUTO: 177 K/UL — SIGNIFICANT CHANGE UP (ref 150–400)
POTASSIUM SERPL-MCNC: 4 MMOL/L — SIGNIFICANT CHANGE UP (ref 3.5–5.3)
POTASSIUM SERPL-SCNC: 4 MMOL/L — SIGNIFICANT CHANGE UP (ref 3.5–5.3)
RBC # BLD: 3.48 M/UL — LOW (ref 3.8–5.2)
RBC # FLD: 12.2 % — SIGNIFICANT CHANGE UP (ref 10.3–14.5)
SODIUM SERPL-SCNC: 134 MMOL/L — LOW (ref 135–145)
SPECIMEN SOURCE: SIGNIFICANT CHANGE UP
SPECIMEN SOURCE: SIGNIFICANT CHANGE UP
WBC # BLD: 11.4 K/UL — HIGH (ref 3.8–10.5)
WBC # FLD AUTO: 11.4 K/UL — HIGH (ref 3.8–10.5)

## 2018-06-01 PROCEDURE — 99233 SBSQ HOSP IP/OBS HIGH 50: CPT | Mod: GC

## 2018-06-01 PROCEDURE — 99232 SBSQ HOSP IP/OBS MODERATE 35: CPT | Mod: GC

## 2018-06-01 RX ORDER — PANTOPRAZOLE SODIUM 20 MG/1
40 TABLET, DELAYED RELEASE ORAL
Qty: 0 | Refills: 0 | Status: DISCONTINUED | OUTPATIENT
Start: 2018-06-02 | End: 2018-06-06

## 2018-06-01 RX ORDER — POLYETHYLENE GLYCOL 3350 17 G/17G
17 POWDER, FOR SOLUTION ORAL DAILY
Qty: 0 | Refills: 0 | Status: DISCONTINUED | OUTPATIENT
Start: 2018-06-01 | End: 2018-06-06

## 2018-06-01 RX ORDER — CEFTRIAXONE 500 MG/1
1 INJECTION, POWDER, FOR SOLUTION INTRAMUSCULAR; INTRAVENOUS EVERY 24 HOURS
Qty: 0 | Refills: 0 | Status: DISCONTINUED | OUTPATIENT
Start: 2018-06-02 | End: 2018-06-03

## 2018-06-01 RX ADMIN — GABAPENTIN 100 MILLIGRAM(S): 400 CAPSULE ORAL at 14:48

## 2018-06-01 RX ADMIN — Medication 15 MILLIGRAM(S): at 17:37

## 2018-06-01 RX ADMIN — NYSTATIN CREAM 1 APPLICATION(S): 100000 CREAM TOPICAL at 17:38

## 2018-06-01 RX ADMIN — GABAPENTIN 100 MILLIGRAM(S): 400 CAPSULE ORAL at 21:20

## 2018-06-01 RX ADMIN — HEPARIN SODIUM 5000 UNIT(S): 5000 INJECTION INTRAVENOUS; SUBCUTANEOUS at 05:28

## 2018-06-01 RX ADMIN — HEPARIN SODIUM 5000 UNIT(S): 5000 INJECTION INTRAVENOUS; SUBCUTANEOUS at 14:48

## 2018-06-01 RX ADMIN — PANTOPRAZOLE SODIUM 40 MILLIGRAM(S): 20 TABLET, DELAYED RELEASE ORAL at 05:28

## 2018-06-01 RX ADMIN — SERTRALINE 12.5 MILLIGRAM(S): 25 TABLET, FILM COATED ORAL at 11:34

## 2018-06-01 RX ADMIN — Medication 25 MILLIGRAM(S): at 05:29

## 2018-06-01 RX ADMIN — Medication 15 MILLIGRAM(S): at 05:29

## 2018-06-01 RX ADMIN — GABAPENTIN 100 MILLIGRAM(S): 400 CAPSULE ORAL at 05:28

## 2018-06-01 RX ADMIN — NYSTATIN CREAM 1 APPLICATION(S): 100000 CREAM TOPICAL at 06:26

## 2018-06-01 RX ADMIN — HEPARIN SODIUM 5000 UNIT(S): 5000 INJECTION INTRAVENOUS; SUBCUTANEOUS at 21:20

## 2018-06-01 RX ADMIN — PREGABALIN 100 MICROGRAM(S): 225 CAPSULE ORAL at 11:34

## 2018-06-01 RX ADMIN — Medication 3 MILLIGRAM(S): at 21:20

## 2018-06-01 RX ADMIN — Medication 81 MILLIGRAM(S): at 11:35

## 2018-06-01 RX ADMIN — SENNA PLUS 2 TABLET(S): 8.6 TABLET ORAL at 21:20

## 2018-06-01 RX ADMIN — CEFTRIAXONE 100 GRAM(S): 500 INJECTION, POWDER, FOR SOLUTION INTRAMUSCULAR; INTRAVENOUS at 14:48

## 2018-06-01 RX ADMIN — Medication 100 MILLIGRAM(S): at 05:28

## 2018-06-01 NOTE — PROGRESS NOTE ADULT - SUBJECTIVE AND OBJECTIVE BOX
infectious diseases progress note:    Patient is a 85y old  Female who presents with a chief complaint of Fever (29 May 2018 18:29)        Acute respiratory distress        R     Allergies    sulfa  patient stated rash (Unknown)  sulfa drugs (Unknown)    Intolerances        ANTIBIOTICS/RELEVANT:  antimicrobials  cefTRIAXone   IVPB 2 Gram(s) IV Intermittent every 24 hours    immunologic:    OTHER:  aspirin enteric coated 81 milliGRAM(s) Oral daily  busPIRone 15 milliGRAM(s) Oral two times a day  cyanocobalamin 100 MICROGram(s) Oral daily  docusate sodium 100 milliGRAM(s) Oral two times a day  gabapentin 100 milliGRAM(s) Oral three times a day  heparin  Injectable 5000 Unit(s) SubCutaneous every 8 hours  melatonin 3 milliGRAM(s) Oral at bedtime  metoprolol succinate ER 25 milliGRAM(s) Oral daily  nystatin Powder 1 Application(s) Topical two times a day  polyethylene glycol 3350 17 Gram(s) Oral daily  senna 2 Tablet(s) Oral at bedtime  sertraline 12.5 milliGRAM(s) Oral daily      Objective:  Vital Signs Last 24 Hrs  T(C): 37.6 (01 Jun 2018 04:59), Max: 37.6 (01 Jun 2018 04:59)  T(F): 99.6 (01 Jun 2018 04:59), Max: 99.6 (01 Jun 2018 04:59)  HR: 67 (01 Jun 2018 08:25) (67 - 78)  BP: 153/80 (01 Jun 2018 04:59) (111/66 - 153/80)  BP(mean): --  RR: 18 (01 Jun 2018 04:59) (18 - 18)  SpO2: 97% (01 Jun 2018 08:25) (96% - 99%)    PHYSICAL EXAM:     Eyes:DARREN, EOMI  Ear/Nose/Throat: no oral lesion, no sinus tenderness on percussion	  Neck:no JVD, no lymphadenopathy, supple  Respiratory: CTA sharon  Cardiovascular: S1S2 RRR, no murmurs  Gastrointestinal:soft, (+) BS, no HSM  Extremities:no e/e/c        LABS:                        10.6   11.4  )-----------( 177      ( 01 Jun 2018 06:43 )             32.2     06-01    134<L>  |  99  |  26<H>  ----------------------------<  122<H>  4.0   |  24  |  0.80    Ca    8.8      01 Jun 2018 06:42  Phos  2.7     06-01  Mg     1.9     06-01    TPro  6.5  /  Alb  3.1<L>  /  TBili  1.0  /  DBili  x   /  AST  25  /  ALT  24  /  AlkPhos  115  05-31            MICROBIOLOGY:    RECENT CULTURES:  05-31 @ 08:30 .Blood Blood-Peripheral                No growth to date.    05-29 @ 16:32 .Urine Catheterized   AHSAN      Escherichia coli  Escherichia coli     10,000 - 49,000 CFU/mL Escherichia coli    05-29 @ 16:22 .Blood Blood   PCR    Growth in aerobic bottle: Gram Negative Coccobacilli  Growth in anaerobic bottle: Gram Negative Coccobacilli  Gram Positive Cocci in Pairs and Chains    Blood Culture PCR  Blood Culture PCR  Blood Culture PCR     Growth in aerobic and anaerobic bottles: Haemophilus influenzae  See previous culture [id number]  Growth in anaerobic bottle: Alpha hemolytic strep  (not Strep. pneumoniae or Enterococcus)  Single set isolate, possible contaminant. Contact  Microbiology if susceptibility testing clinically  indicated.  "Due to technical problems, Proteus sp. will Not be reported as part of  the BCID panel until further notice"  ***Blood Panel PCR results on this specimen are available  approximately 3 hours after the Gram stain result.***  Gram stain, PCR, and/or culture results may not always  correspond due to difference in methodologies.  ************************************************************  This PCR assay was performed using [employer].  The following targets are tested for: Enterococcus,  vancomycin resistant enterococci, Listeria monocytogenes,  coagulase negative staphylococci, S. aureus,  methicillin resistant S. aureus, Streptococcus agalactiae  (Group B), S. pneumoniae, S. pyogenes (Group A),  Acinetobacter baumannii, Enterobacter cloacae, E. coli,  Klebsiella oxytoca, K. pneumoniae, Proteus sp.,  Serratia marcescens, Haemophilus influenzae,  Neisseria meningitidis, Pseudomonas aeruginosa, Candida  albicans, C. glabrata, C krusei, C parapsilosis,  C. tropicalis and the KPC resistance gene.          RESPIRATORY CULTURES:              RADIOLOGY & ADDITIONAL STUDIES:        Pager [phone]  After 5 pm/weekends or if no response :[phone]

## 2018-06-01 NOTE — PROGRESS NOTE ADULT - PROBLEM SELECTOR PLAN 2
Round to have RLL PNA associated with R pleural effusion concerning for aspiration PNA leading to acute hypoxic respiratory failure requiring BIPAP, improved to nasal cannula 6L. Aspiration possibly related to progessiva dementia vs lethargic due to sepsis. RVP neg  -Continue ceftriaxone  -Titrate oxygen to maintain SpO2 >92%, currently on 4L NC Round to have RLL PNA associated with R pleural effusion concerning for aspiration PNA leading to acute hypoxic respiratory failure requiring BIPAP, improved to nasal cannula. Aspiration possibly related to progessiva dementia vs lethargy due to sepsis. RVP neg  -Continue ceftriaxone  -Titrate oxygen to maintain SpO2 >92%, currently on 4L NC

## 2018-06-01 NOTE — PROGRESS NOTE ADULT - ASSESSMENT
88yo woman with hx dementia, severe AS, HTN, HLD, DM2 presenting from nursing home with severe sepsis due to Haemophilus pneumoniae bacteremia and aspiration pneumonia. 88yo woman with hx dementia, severe AS, HTN, HLD, DM2 presenting from nursing home with severe sepsis and acute hypoxic respiratory failure due to Haemophilus pneumoniae bacteremia and aspiration pneumonia.

## 2018-06-01 NOTE — PROGRESS NOTE ADULT - ATTENDING COMMENTS
Patient interviewed and examined.  Chart reviewed and note edited where appropriate.  Case discussed with fellow.  Agree w/ Assessment and Plan as outlined.    Nico Kee MD [facility]  Spectra:  [phone]  Office: [phone]

## 2018-06-01 NOTE — PROGRESS NOTE ADULT - PROBLEM SELECTOR PLAN 9
Not a TAVR candidate, last in 2012 with hyperdynamic LV func, mild AS. Outpatient follow up  -Continue Toprol 25  -f/u TTE Not a TAVR candidate, last in 2012 with hyperdynamic LV func, mild AS. Outpatient follow up  -TTE 5/31: LVEF 70-75%, no segmental wall motion abnormalities, severe AS, mild AR, nl LV and RV function  -Continue Toprol 25

## 2018-06-01 NOTE — PROGRESS NOTE ADULT - PROBLEM SELECTOR PLAN 4
Resolved. Presented with right lobe pneumonia associated and Haemophilus influenzae bacteremia with lactate 4 and hypotension. UA and RVP neg. s/p 2.5L IVF in ED  -continue CTX

## 2018-06-01 NOTE — PROGRESS NOTE ADULT - ASSESSMENT
87 yr old from nursing home with confusion sob dementia admitted with right lower lobe infiltrate and blood cultures positive for H. flu.   Seems to be improving    Bacteremic H flu pneumonia    agree with ceftriaxone   will be able to transition to po antibiotics in  several day        follow up bc  negative and 1/4 bc with alpha strep is not significant.    Day 4 of antibiotics today.  needs to complete 8 days for h. flu pna  can continue ceftriaxone but if she is otherwise ready to go back  to rehab, the 8 day course can be complete with po Levaquin 250 q day  please call for any questions

## 2018-06-01 NOTE — PROGRESS NOTE ADULT - SUBJECTIVE AND OBJECTIVE BOX
Cardiology Progress Note    Interval events: No acute events overnight. No complaints this morning. On BiPAP. No chest pain or SOB.    Tele: no tele    Medications:  aspirin enteric coated 81 milliGRAM(s) Oral daily  busPIRone 15 milliGRAM(s) Oral two times a day  cefTRIAXone   IVPB 2 Gram(s) IV Intermittent every 24 hours  cyanocobalamin 100 MICROGram(s) Oral daily  docusate sodium 100 milliGRAM(s) Oral two times a day  gabapentin 100 milliGRAM(s) Oral three times a day  heparin  Injectable 5000 Unit(s) SubCutaneous every 8 hours  melatonin 3 milliGRAM(s) Oral at bedtime  metoprolol succinate ER 25 milliGRAM(s) Oral daily  nystatin Powder 1 Application(s) Topical two times a day  pantoprazole  Injectable 40 milliGRAM(s) IV Push daily  senna 2 Tablet(s) Oral at bedtime  sertraline 12.5 milliGRAM(s) Oral daily      Review of Systems:  Constitutional: [ ] Fever [ ] Chills [ ] Fatigue [ ] Weight change   HEENT: [ ] Blurred vision [ ] Eye Pain [ ] Headache [ ] Runny nose [ ] Sore Throat   Respiratory: [ ] Cough [ ] Wheezing [ ] Shortness of breath  Cardiovascular: [ ] Chest Pain [ ] Palpitations [ ] ROMERO [ ] PND [ ] Orthopnea  Gastrointestinal: [ ] Abdominal Pain [ ] Diarrhea [ ] Constipation [ ] Hemorrhoids [ ] Nausea [ ] Vomiting  Genitourinary: [ ] Nocturia [ ] Dysuria [ ] Incontinence  Extremities: [ ] Swelling [ ] Joint Pain  Neurologic: [ ] Focal deficit [ ] Paresthesias [ ] Syncope  Lymphatic: [ ] Swelling [ ] Lymphadenopathy   Skin: [ ] Rash [ ] Ecchymoses [ ] Wounds [ ] Lesions  Psychiatry: [ ] Depression [ ] Suicidal/Homicidal Ideation [ ] Anxiety [ ] Sleep Disturbances  [x] 10 point review of systems is otherwise negative except as mentioned above            [ ]Unable to obtain    Vitals:  T(C): 37.6 (06-01-18 @ 04:59), Max: 37.6 (06-01-18 @ 04:59)  HR: 70 (06-01-18 @ 04:59) (67 - 92)  BP: 153/80 (06-01-18 @ 04:59) (111/66 - 153/80)  BP(mean): --  RR: 18 (06-01-18 @ 04:59) (18 - 18)  SpO2: 97% (06-01-18 @ 04:59) (96% - 99%)  Wt(kg): --  Daily     Daily   I&O's Summary    30 May 2018 07:01  -  31 May 2018 07:00  --------------------------------------------------------  IN: 950 mL / OUT: 1425 mL / NET: -475 mL    31 May 2018 07:01  -  01 Jun 2018 05:21  --------------------------------------------------------  IN: 1120 mL / OUT: 1350 mL / NET: -230 mL        Physical Exam:  Appearance: NAD  Eyes: PERRL,  HENT: Normal oral [place], NC/AT  Cardiovascular: normal S1 and absent S2, rrr, III/VI crescendo decrescendo murmur mid peaking at aortic area radiating to carotids and subclavians.  Similar mixed frequency/yosef mat apex, no edema, normal JVP  Respiratory: Clear to auscultation bilaterally  Gastrointestinal: Soft, non-tender, non-distended, BS+  Lymphatic: No lymphadenopathy  Psychiatry: AAOx3, mood & affect appropriate  Skin: No rashes, no ecchymoses, no cyanosis      Labs:                        11.0   13.4  )-----------( 182      ( 31 May 2018 07:14 )             35.8     05-31    137  |  100  |  34<H>  ----------------------------<  119<H>  3.8   |  24  |  0.99    Ca    9.2      31 May 2018 07:07  Phos  2.6     05-31  Mg     1.9     05-31    TPro  6.5  /  Alb  3.1<L>  /  TBili  1.0  /  DBili  x   /  AST  25  /  ALT  24  /  AlkPhos  115  05-31      CARDIAC MARKERS ( 30 May 2018 06:38 )  x     / 0.16 ng/mL / 177 U/L / x     / 6.0 ng/mL              New results/imaging:

## 2018-06-01 NOTE — PROGRESS NOTE ADULT - ASSESSMENT
85F with dementia, severe AS, DM, HTN, HLD living in a nursing home presents with fever and respiratory distress. Troponin elevated to 0.12. Nonspecific EKG changes.  Type II NSTEMI in setting of sepsis.    #Type II NSTEMI  -- continue asa/statin  -- continue metoprolol  -- resume statin  -- TTE    #Severe AS  -- outpatient follow up. Not a candidate for TAVR.    #HTN  -- continue metoprolol    Salazar Webb MD 85F with dementia, severe AS, DM, HTN, HLD living in a nursing home presents with fever and respiratory distress. Troponin elevated to 0.12. Nonspecific EKG changes.  Type II NSTEMI in setting of sepsis.  Doubt IE.    #Type II NSTEMI  -- continue asa/statin  -- continue metoprolol  -- resume statin  -- TTE    #Severe AS  -- outpatient follow up. Not a candidate for TAVR.    #HTN  -- continue metoprolol    Salazar Webb MD

## 2018-06-01 NOTE — PROGRESS NOTE ADULT - PROBLEM SELECTOR PLAN 5
type II NSTEMI in setting of demand ischemia from sepsis. Trop peaked 0.2, now downtrended  -Monitor off tele  -Cardiology recs appreciated  -Continue ASA, Toprol

## 2018-06-01 NOTE — PHYSICAL THERAPY INITIAL EVALUATION ADULT - CRITERIA FOR SKILLED THERAPEUTIC INTERVENTIONS
impairments found/anticipated equipment needs at discharge/functional limitations in following categories/risk reduction/prevention/therapy frequency/anticipated discharge recommendation/rehab potential/predicted duration of therapy intervention

## 2018-06-01 NOTE — PROGRESS NOTE ADULT - PROBLEM SELECTOR PLAN 8
Lives at nursing home since 1/2018. Oriented x 2. QTc 471 (5/29). Per son, patient is at baseline mental status  -Continue quetiapine and Buspar

## 2018-06-01 NOTE — PHYSICAL THERAPY INITIAL EVALUATION ADULT - PERTINENT HX OF CURRENT PROBLEM, REHAB EVAL
86yo woman with hx dementia, severe AS, HTN, HLD, DM2 presenting from nursing home with severe sepsis and acute hypoxic respiratory failure due to Haemophilus pneumoniae bacteremia and aspiration pneumonia. Round to have RLL PNA associated with R pleural effusion concerning for aspiration PNA leading to acute hypoxic respiratory failure requiring BIPAP

## 2018-06-01 NOTE — PROGRESS NOTE ADULT - ATTENDING COMMENTS
severe sepsis, acute hypoxic respiratory failure and acute encephalopathy 2/2 H. influenza CAP with bacteremia. 1/4 blood cx also growing strep species, repeat blood cx NGTD (suspected contaminant). Clinically improved, on nasal cannula, ceftriaxone. check TTE in light of hx of aortic stenosis and + strep blood cx. unlikely endocarditis but will need to rule out. pt with hx of hyperdynamic LV function. appreciate cardiology recs. elevated trop likely demand ischemia. back on b blocker. off tele. continue remainder of plan per resident note . Suspect discharge back to NH on monday.

## 2018-06-01 NOTE — PHYSICAL THERAPY INITIAL EVALUATION ADULT - DISCHARGE PLANNER MADE AWARE
Eye Block Checklist  Preanesthetic Checklist:  Patient Identified, Patient Hemodynamically Stable, Timeout Performed, Risks and Benefits Discussed, Monitors and Equipment Checked and Site Marked  Patient Position:  Supine  Monitors Checked:  EKG, NIBP, Pulse Oximetry and ET CO2  Oxygen Supplement:  Face Mask  Skin Prep:  Alcohol    Eye Block Details  Eye Block Type:  Retrobulbar  Laterality:  Left  Local Anesthetic:  1:1 2% Lidocaine with 1:200,000 epinephrine and 0.75% bupivicaine with hyaluronidase  Local Anesthetic Volume:  5 mL  LID Block:  No  Quality of Block:  Adequate    Eye Block Note         yes

## 2018-06-01 NOTE — PROGRESS NOTE ADULT - SUBJECTIVE AND OBJECTIVE BOX
Internal Medicine Progress Note    Cindy Madrid, PGY1  Internal Medicine, team 1  Pager [phone] / [phone]  After 7PM on weekdays and 12PM on weekends, please page #[phone]    Patient is a 85y old  Female who presents with a chief complaint of Fever (29 May 2018 18:29)      SUBJECTIVE / OVERNIGHT EVENTS:     MEDICATIONS  (STANDING):  aspirin enteric coated 81 milliGRAM(s) Oral daily  busPIRone 15 milliGRAM(s) Oral two times a day  cefTRIAXone   IVPB 2 Gram(s) IV Intermittent every 24 hours  cyanocobalamin 100 MICROGram(s) Oral daily  docusate sodium 100 milliGRAM(s) Oral two times a day  gabapentin 100 milliGRAM(s) Oral three times a day  heparin  Injectable 5000 Unit(s) SubCutaneous every 8 hours  melatonin 3 milliGRAM(s) Oral at bedtime  metoprolol succinate ER 25 milliGRAM(s) Oral daily  nystatin Powder 1 Application(s) Topical two times a day  pantoprazole  Injectable 40 milliGRAM(s) IV Push daily  senna 2 Tablet(s) Oral at bedtime  sertraline 12.5 milliGRAM(s) Oral daily    MEDICATIONS  (PRN):      Vital Signs Last 24 Hrs  T(C): 37.6 (01 Jun 2018 04:59), Max: 37.6 (01 Jun 2018 04:59)  T(F): 99.6 (01 Jun 2018 04:59), Max: 99.6 (01 Jun 2018 04:59)  HR: 71 (01 Jun 2018 06:26) (67 - 78)  BP: 153/80 (01 Jun 2018 04:59) (111/66 - 153/80)  BP(mean): --  RR: 18 (01 Jun 2018 04:59) (18 - 18)  SpO2: 97% (01 Jun 2018 06:26) (96% - 99%)    CAPILLARY BLOOD GLUCOSE      POCT Blood Glucose.: 148 mg/dL (31 May 2018 18:02)  POCT Blood Glucose.: 139 mg/dL (31 May 2018 13:00)  POCT Blood Glucose.: 121 mg/dL (31 May 2018 09:06)      I&O's Summary    31 May 2018 07:01  -  01 Jun 2018 07:00  --------------------------------------------------------  IN: 1240 mL / OUT: 1350 mL / NET: -110 mL      PHYSICAL EXAM  GENERAL: NAD  HEAD:  Atraumatic  EYES: EOMI, PERRLA, conjunctiva and sclera clear  NECK: Supple, No JVD, no cervical LAD  CHEST/LUNG: crackles at R lung base; No wheeze, respirations nonlabored on nasal cannula  HEART: Regular rate and rhythm; No murmurs, rubs, or gallops  ABDOMEN: Soft, Nontender, Nondistended; Bowel sounds present  EXTREMITIES:  2+ Peripheral Pulses, No clubbing, cyanosis, or edema  NEURO/PSYCH: AAOx2 (self, hospital), nonfocal  SKIN: No rashes or lesions      LABS:                        11.0   13.4  )-----------( 182      ( 31 May 2018 07:14 )             35.8     CBC Full  -  ( 31 May 2018 07:14 )  WBC Count : 13.4 K/uL  Hemoglobin : 11.0 g/dL  Hematocrit : 35.8 %  Platelet Count - Automated : 182 K/uL  Mean Cell Volume : 93.5 fl  Mean Cell Hemoglobin : 28.8 pg  Mean Cell Hemoglobin Concentration : 30.8 gm/dL  Auto Neutrophil # : x  Auto Lymphocyte # : x  Auto Monocyte # : x  Auto Eosinophil # : x  Auto Basophil # : x  Auto Neutrophil % : x  Auto Lymphocyte % : x  Auto Monocyte % : x  Auto Eosinophil % : x  Auto Basophil % : x    06-01    134<L>  |  99  |  26<H>  ----------------------------<  122<H>  4.0   |  24  |  0.80    Ca    8.8      01 Jun 2018 06:42  Phos  2.7     06-01  Mg     1.9     06-01    TPro  6.5  /  Alb  3.1<L>  /  TBili  1.0  /  DBili  x   /  AST  25  /  ALT  24  /  AlkPhos  115  05-31    Creatinine Trend: 0.80<--, 0.99<--, 1.02<--, 1.07<--  LIVER FUNCTIONS - ( 31 May 2018 07:07 )  Alb: 3.1 g/dL / Pro: 6.5 g/dL / ALK PHOS: 115 U/L / ALT: 24 U/L / AST: 25 U/L / GGT: x                       MICROBIOLOGY:    Culture - Urine (collected 29 May 2018 16:32)  Source: .Urine Catheterized  Final Report (31 May 2018 17:29):    10,000 - 49,000 CFU/mL Escherichia coli  Organism: Escherichia coli (31 May 2018 17:29)  Organism: Escherichia coli (31 May 2018 17:29)    Culture - Blood (collected 29 May 2018 16:22)  Source: .Blood Blood  Gram Stain (30 May 2018 14:16):    Growth in aerobic bottle: Gram Negative Coccobacilli    Growth in anaerobic bottle: Gram Negative Coccobacilli  Preliminary Report (31 May 2018 15:02):    Growth in aerobic and anaerobic bottles: Haemophilus influenzae    Culture - Blood (collected 29 May 2018 16:22)  Source: .Blood Blood  Gram Stain (30 May 2018 16:50):    Growth in aerobic bottle: Gram Negative Coccobacilli    Growth in anaerobic bottle: Gram Negative Coccobacilli    Gram Positive Cocci in Pairs and Chains  Preliminary Report (31 May 2018 12:48):    Growth in aerobic bottle: Haemophilus influenzae    See previous culture [id number]    Growth in anaerobic bottle: Alpha hemolytic strep    (not Strep. pneumoniae or Enterococcus)    Single set isolate, possible contaminant. Contact    Microbiology if susceptibility testing clinically    indicated.    "Due to technical problems, Proteus sp. will Not be reported as part of    the BCID panel until further notice"    ***Blood Panel PCR results on this specimen are available    approximately 3 hours after the Gram stain result.***    Gram stain, PCR, and/or culture results may not always    correspond due to difference in methodologies.    ************************************************************    This PCR assay was performed using [employer].    The following targets are tested for: Enterococcus,    vancomycin resistant enterococci, Listeria monocytogenes,    coagulase negative staphylococci, S. aureus,    methicillin resistant S. aureus, Streptococcus agalactiae    (Group B), S. pneumoniae, S. pyogenes (GroupA),    Acinetobacter baumannii, Enterobacter cloacae, E. coli,    Klebsiella oxytoca, K. pneumoniae, Proteus sp.,    Serratia marcescens, Haemophilus influenzae,    Neisseria meningitidis, Pseudomonas aeruginosa, Candida    albicans, C. glabrata, C krusei, C parapsilosis,    C. tropicalis and the KPC resistance gene.  Organism: Blood Culture PCR  Blood Culture PCR (30 May 2018 18:25)  Organism: Blood Culture PCR (30 May 2018 18:25)  Organism: Blood Culture PCR (30 May 2018 10:17)        RADIOLOGY & ADDITIONAL TESTS:     CONSULTS: cards, ID Internal Medicine Progress Note    Cindy Madrid, PGY1  Internal Medicine, team 1  Pager [phone] / [phone]  After 7PM on weekdays and 12PM on weekends, please page #[phone]    Patient is a 85y old  Female who presents with a chief complaint of Fever (29 May 2018 18:29)      SUBJECTIVE / OVERNIGHT EVENTS: Has been on 4L NC overnight with good saturation. Per nurse, no events overnight. Pt has no complaints this morning. Denies SOB, CP, F/C, abd pain. Last BM recorded 5/30. Alpha-hemolytic Strep growth in 1/4 bottles. E coli 10-49cfu in urine sensitive to CTX.    MEDICATIONS  (STANDING):  aspirin enteric coated 81 milliGRAM(s) Oral daily  busPIRone 15 milliGRAM(s) Oral two times a day  cefTRIAXone   IVPB 2 Gram(s) IV Intermittent every 24 hours  cyanocobalamin 100 MICROGram(s) Oral daily  docusate sodium 100 milliGRAM(s) Oral two times a day  gabapentin 100 milliGRAM(s) Oral three times a day  heparin  Injectable 5000 Unit(s) SubCutaneous every 8 hours  melatonin 3 milliGRAM(s) Oral at bedtime  metoprolol succinate ER 25 milliGRAM(s) Oral daily  nystatin Powder 1 Application(s) Topical two times a day  pantoprazole  Injectable 40 milliGRAM(s) IV Push daily  senna 2 Tablet(s) Oral at bedtime  sertraline 12.5 milliGRAM(s) Oral daily    MEDICATIONS  (PRN):      Vital Signs Last 24 Hrs  T(C): 37.6 (01 Jun 2018 04:59), Max: 37.6 (01 Jun 2018 04:59)  T(F): 99.6 (01 Jun 2018 04:59), Max: 99.6 (01 Jun 2018 04:59)  HR: 71 (01 Jun 2018 06:26) (67 - 78)  BP: 153/80 (01 Jun 2018 04:59) (111/66 - 153/80)  BP(mean): --  RR: 18 (01 Jun 2018 04:59) (18 - 18)  SpO2: 97% (01 Jun 2018 06:26) (96% - 99%)    CAPILLARY BLOOD GLUCOSE      POCT Blood Glucose.: 148 mg/dL (31 May 2018 18:02)  POCT Blood Glucose.: 139 mg/dL (31 May 2018 13:00)  POCT Blood Glucose.: 121 mg/dL (31 May 2018 09:06)      I&O's Summary    31 May 2018 07:01  -  01 Jun 2018 07:00  --------------------------------------------------------  IN: 1240 mL / OUT: 1350 mL / NET: -110 mL      PHYSICAL EXAM  GENERAL: NAD  HEAD:  Atraumatic  EYES: EOMI, PERRLA, conjunctiva and sclera clear  NECK: Supple, No JVD, no cervical LAD  CHEST/LUNG: crackles at R lung base with decreased air movement; No wheeze, respirations nonlabored on nasal cannula  HEART: Regular rate and rhythm; No murmurs, rubs, or gallops  ABDOMEN: Soft, Nontender, Nondistended; Bowel sounds present  EXTREMITIES:  2+ Peripheral Pulses, No clubbing, cyanosis, or edema  NEURO/PSYCH: AAOx2 (self, hospital), nonfocal  SKIN: No rashes or lesions      LABS:                        10.6   11.4  )-----------( 177      ( 01 Jun 2018 06:43 )             32.2     CBC Full  -  ( 01 Jun 2018 06:43 )  WBC Count : 11.4 K/uL  Hemoglobin : 10.6 g/dL  Hematocrit : 32.2 %  Platelet Count - Automated : 177 K/uL  Mean Cell Volume : 92.4 fl  Mean Cell Hemoglobin : 30.5 pg  Mean Cell Hemoglobin Concentration : 33.0 gm/dL  Auto Neutrophil # : x  Auto Lymphocyte # : x  Auto Monocyte # : x  Auto Eosinophil # : x  Auto Basophil # : x  Auto Neutrophil % : x  Auto Lymphocyte % : x  Auto Monocyte % : x  Auto Eosinophil % : x  Auto Basophil % : x    06-01    134<L>  |  99  |  26<H>  ----------------------------<  122<H>  4.0   |  24  |  0.80    Ca    8.8      01 Jun 2018 06:42  Phos  2.7     06-01  Mg     1.9     06-01    TPro  6.5  /  Alb  3.1<L>  /  TBili  1.0  /  DBili  x   /  AST  25  /  ALT  24  /  AlkPhos  115  05-31    Creatinine Trend: 0.80<--, 0.99<--, 1.02<--, 1.07<--  LIVER FUNCTIONS - ( 31 May 2018 07:07 )  Alb: 3.1 g/dL / Pro: 6.5 g/dL / ALK PHOS: 115 U/L / ALT: 24 U/L / AST: 25 U/L / GGT: x                       MICROBIOLOGY:    Culture - Blood (collected 31 May 2018 08:30)  Source: .Blood Blood-Venous  Preliminary Report (01 Jun 2018 09:01):    No growth to date.    Culture - Blood (collected 31 May 2018 08:30)  Source: .Blood Blood-Peripheral  Preliminary Report (01 Jun 2018 09:01):    No growth to date.    Culture - Urine (collected 29 May 2018 16:32)  Source: .Urine Catheterized  Final Report (31 May 2018 17:29):    10,000 - 49,000 CFU/mL Escherichia coli  Organism: Escherichia coli (31 May 2018 17:29)  Organism: Escherichia coli (31 May 2018 17:29)    Culture - Blood (collected 29 May 2018 16:22)  Source: .Blood Blood  Gram Stain (30 May 2018 14:16):    Growth in aerobic bottle: Gram Negative Coccobacilli    Growth in anaerobic bottle: Gram Negative Coccobacilli  Final Report (01 Jun 2018 09:03):    Growth in aerobic and anaerobic bottles: Haemophilus influenzae  Organism: Haemophilus influenzae (01 Jun 2018 09:03)  Organism: Haemophilus influenzae (01 Jun 2018 09:03)    Culture - Blood (collected 29 May 2018 16:22)  Source: .Blood Blood  Gram Stain (30 May 2018 16:50):    Growth in aerobic bottle: Gram Negative Coccobacilli    Growth in anaerobic bottle: Gram Negative Coccobacilli    Gram Positive Cocci in Pairs and Chains  Final Report (01 Jun 2018 09:07):    Growth in aerobic and anaerobic bottles: Haemophilus influenzae    See previous culture [id number]    Growth in anaerobic bottle: Alpha hemolytic strep    (not Strep. pneumoniae or Enterococcus)    Single set isolate, possible contaminant. Contact    Microbiology if susceptibility testing clinically    indicated.    "Due to technical problems, Proteus sp. will Not be reported as part of    the BCID panel until further notice"    ***Blood Panel PCR results on this specimen are available    approximately 3 hours after the Gram stain result.***    Gram stain, PCR, and/or culture results may not always    correspond due to difference in methodologies.    ************************************************************    This PCR assay was performed using [employer].    The following targets are tested for: Enterococcus,    vancomycin resistant enterococci, Listeria monocytogenes,    coagulase negative staphylococci, S. aureus,    methicillin resistant S. aureus, Streptococcus agalactiae    (Group B), S. pneumoniae, S. pyogenes (Group A),    Acinetobacter baumannii, Enterobacter cloacae, E. coli,    Klebsiella oxytoca, K. pneumoniae, Proteus sp.,    Serratia marcescens, Haemophilus influenzae,    Neisseria meningitidis, Pseudomonas aeruginosa, Candida    albicans, C. glabrata, C krusei, C parapsilosis,    C. tropicalis and the KPC resistance gene.  Organism: Blood Culture PCR  Blood Culture PCR (01 Jun 2018 09:07)  Organism: Blood Culture PCR (01 Jun 2018 09:07)  Organism: Blood Culture PCR (01 Jun 2018 09:07)          RADIOLOGY & ADDITIONAL TESTS:  < from: Transthoracic Echocardiogram (05.31.18 @ 20:38) >  Conclusions: LVEF 70-75%  1. Mitral annular calcification and calcified mitral  leaflets with normal diastolic opening. Mild mitral  regurgitation.  2. Calcified trileaflet aortic valve with decreased  opening. Peak transaortic valve gradient equals 96 mm Hg,  mean transaortic valve gradient equals 57 mm Hg, estimated  aortic valve area equals 0.8 sqcm (by continuity equation),  aortic valve velocity time integral equals 115 cm,  consistent with severe aortic stenosis. Mild aortic  regurgitation.  3. Moderately dilated left atrium.  LA volume index = 43  cc/m2.  4. Mild concentric left ventricular hypertrophy.  5. Normal left ventricular systolic function. No segmental  wall motion abnormalities.  6. Normal right ventricular size and function.  7. Estimated right ventricular systolic pressure equals 39  mm Hg, assuming right atrial pressure equals 8 mm Hg,  consistent with borderline pulmonary hypertension.  *** No previous Echo exam.    < end of copied text >       CONSULTS: cards, ID

## 2018-06-01 NOTE — PROGRESS NOTE ADULT - PROBLEM SELECTOR PLAN 1
Haemophilus influenzae bacteremia likely from CAP. 1/4 bottles also growing Strep species  -Increase to CTX 2gm daily   -Appreciate ID recs  -Repeat cultures sent  -f/u sensitivities, TTE Haemophilus influenzae bacteremia likely from CAP. 1/4 bottles also growing Alpha-hemolytic Strep species  -Repeat BCx no growth  -Increase to CTX 2gm daily   -TTE no sign of vegetation  -Appreciate ID recs Haemophilus influenzae bacteremia likely from CAP. 1/4 bottles also growing Alpha-hemolytic Strep species likely contaminant  -Repeat BCx no growth  -Increase to CTX 2gm daily   -TTE no sign of vegetation  -Appreciate ID recs

## 2018-06-01 NOTE — PHYSICAL THERAPY INITIAL EVALUATION ADULT - ADDITIONAL COMMENTS
85 year old female who PTA was living at [facility]. Pt was ambulatory with a walker and required assistance for ADL's.

## 2018-06-02 LAB
ANION GAP SERPL CALC-SCNC: 12 MMOL/L — SIGNIFICANT CHANGE UP (ref 5–17)
BUN SERPL-MCNC: 22 MG/DL — SIGNIFICANT CHANGE UP (ref 7–23)
CALCIUM SERPL-MCNC: 8.7 MG/DL — SIGNIFICANT CHANGE UP (ref 8.4–10.5)
CHLORIDE SERPL-SCNC: 99 MMOL/L — SIGNIFICANT CHANGE UP (ref 96–108)
CO2 SERPL-SCNC: 24 MMOL/L — SIGNIFICANT CHANGE UP (ref 22–31)
CREAT SERPL-MCNC: 0.8 MG/DL — SIGNIFICANT CHANGE UP (ref 0.5–1.3)
GLUCOSE SERPL-MCNC: 122 MG/DL — HIGH (ref 70–99)
HCT VFR BLD CALC: 30.8 % — LOW (ref 34.5–45)
HGB BLD-MCNC: 9.9 G/DL — LOW (ref 11.5–15.5)
MAGNESIUM SERPL-MCNC: 1.9 MG/DL — SIGNIFICANT CHANGE UP (ref 1.6–2.6)
MCHC RBC-ENTMCNC: 29.9 PG — SIGNIFICANT CHANGE UP (ref 27–34)
MCHC RBC-ENTMCNC: 32.1 GM/DL — SIGNIFICANT CHANGE UP (ref 32–36)
MCV RBC AUTO: 93.1 FL — SIGNIFICANT CHANGE UP (ref 80–100)
PHOSPHATE SERPL-MCNC: 2.6 MG/DL — SIGNIFICANT CHANGE UP (ref 2.5–4.5)
PLATELET # BLD AUTO: 207 K/UL — SIGNIFICANT CHANGE UP (ref 150–400)
POTASSIUM SERPL-MCNC: 4 MMOL/L — SIGNIFICANT CHANGE UP (ref 3.5–5.3)
POTASSIUM SERPL-SCNC: 4 MMOL/L — SIGNIFICANT CHANGE UP (ref 3.5–5.3)
RBC # BLD: 3.31 M/UL — LOW (ref 3.8–5.2)
RBC # FLD: 12.2 % — SIGNIFICANT CHANGE UP (ref 10.3–14.5)
SODIUM SERPL-SCNC: 135 MMOL/L — SIGNIFICANT CHANGE UP (ref 135–145)
WBC # BLD: 10.8 K/UL — HIGH (ref 3.8–10.5)
WBC # FLD AUTO: 10.8 K/UL — HIGH (ref 3.8–10.5)

## 2018-06-02 PROCEDURE — 99233 SBSQ HOSP IP/OBS HIGH 50: CPT

## 2018-06-02 RX ADMIN — GABAPENTIN 100 MILLIGRAM(S): 400 CAPSULE ORAL at 13:05

## 2018-06-02 RX ADMIN — SENNA PLUS 2 TABLET(S): 8.6 TABLET ORAL at 21:40

## 2018-06-02 RX ADMIN — HEPARIN SODIUM 5000 UNIT(S): 5000 INJECTION INTRAVENOUS; SUBCUTANEOUS at 05:04

## 2018-06-02 RX ADMIN — HEPARIN SODIUM 5000 UNIT(S): 5000 INJECTION INTRAVENOUS; SUBCUTANEOUS at 21:40

## 2018-06-02 RX ADMIN — PREGABALIN 100 MICROGRAM(S): 225 CAPSULE ORAL at 13:06

## 2018-06-02 RX ADMIN — Medication 81 MILLIGRAM(S): at 09:03

## 2018-06-02 RX ADMIN — Medication 25 MILLIGRAM(S): at 05:04

## 2018-06-02 RX ADMIN — PANTOPRAZOLE SODIUM 40 MILLIGRAM(S): 20 TABLET, DELAYED RELEASE ORAL at 05:04

## 2018-06-02 RX ADMIN — Medication 15 MILLIGRAM(S): at 17:24

## 2018-06-02 RX ADMIN — GABAPENTIN 100 MILLIGRAM(S): 400 CAPSULE ORAL at 05:04

## 2018-06-02 RX ADMIN — HEPARIN SODIUM 5000 UNIT(S): 5000 INJECTION INTRAVENOUS; SUBCUTANEOUS at 13:06

## 2018-06-02 RX ADMIN — Medication 3 MILLIGRAM(S): at 21:40

## 2018-06-02 RX ADMIN — SERTRALINE 12.5 MILLIGRAM(S): 25 TABLET, FILM COATED ORAL at 09:02

## 2018-06-02 RX ADMIN — Medication 15 MILLIGRAM(S): at 05:04

## 2018-06-02 RX ADMIN — NYSTATIN CREAM 1 APPLICATION(S): 100000 CREAM TOPICAL at 17:25

## 2018-06-02 RX ADMIN — GABAPENTIN 100 MILLIGRAM(S): 400 CAPSULE ORAL at 21:40

## 2018-06-02 RX ADMIN — NYSTATIN CREAM 1 APPLICATION(S): 100000 CREAM TOPICAL at 05:09

## 2018-06-02 RX ADMIN — CEFTRIAXONE 100 GRAM(S): 500 INJECTION, POWDER, FOR SOLUTION INTRAMUSCULAR; INTRAVENOUS at 13:05

## 2018-06-02 NOTE — PROGRESS NOTE ADULT - ASSESSMENT
86yo woman with hx dementia, severe AS, HTN, HLD, DM2 presenting from nursing home with severe sepsis and acute hypoxic respiratory failure due to Haemophilus pneumoniae bacteremia and aspiration pneumonia.

## 2018-06-02 NOTE — PROGRESS NOTE ADULT - PROBLEM SELECTOR PLAN 10
DVT ppx: HSQ  Diet: regular diet  Dispo; likely back to NH; PT consult DVT ppx: HSQ  Diet: regular diet  Dispo; likely back to NH DVT ppx: HSQ  Diet: regular diet  Dispo; back to NH

## 2018-06-02 NOTE — PROGRESS NOTE ADULT - SUBJECTIVE AND OBJECTIVE BOX
Internal Medicine Progress Note    Cindy Madrid, PGY1  Internal Medicine, team 1  Pager [phone] / [phone]  After 7PM on weekdays and 12PM on weekends, please page #[phone]    Patient is a 85y old  Female who presents with a chief complaint of Fever (29 May 2018 18:29)      SUBJECTIVE / OVERNIGHT EVENTS:     MEDICATIONS  (STANDING):  aspirin enteric coated 81 milliGRAM(s) Oral daily  busPIRone 15 milliGRAM(s) Oral two times a day  cefTRIAXone   IVPB 1 Gram(s) IV Intermittent every 24 hours  cyanocobalamin 100 MICROGram(s) Oral daily  docusate sodium 100 milliGRAM(s) Oral two times a day  gabapentin 100 milliGRAM(s) Oral three times a day  heparin  Injectable 5000 Unit(s) SubCutaneous every 8 hours  melatonin 3 milliGRAM(s) Oral at bedtime  metoprolol succinate ER 25 milliGRAM(s) Oral daily  nystatin Powder 1 Application(s) Topical two times a day  pantoprazole    Tablet 40 milliGRAM(s) Oral before breakfast  polyethylene glycol 3350 17 Gram(s) Oral daily  senna 2 Tablet(s) Oral at bedtime  sertraline 12.5 milliGRAM(s) Oral daily    MEDICATIONS  (PRN):      Vital Signs Last 24 Hrs  T(C): 36.4 (02 Jun 2018 04:38), Max: 37.7 (01 Jun 2018 21:28)  T(F): 97.6 (02 Jun 2018 04:38), Max: 99.8 (01 Jun 2018 21:28)  HR: 69 (02 Jun 2018 05:49) (61 - 78)  BP: 135/72 (02 Jun 2018 04:38) (112/70 - 135/72)  BP(mean): --  RR: 18 (02 Jun 2018 06:33) (18 - 18)  SpO2: 96% (02 Jun 2018 06:33) (92% - 98%)    CAPILLARY BLOOD GLUCOSE          I&O's Summary    31 May 2018 07:01  -  01 Jun 2018 07:00  --------------------------------------------------------  IN: 1240 mL / OUT: 1350 mL / NET: -110 mL    01 Jun 2018 07:01  -  02 Jun 2018 06:49  --------------------------------------------------------  IN: 600 mL / OUT: 0 mL / NET: 600 mL        PHYSICAL EXAM  GENERAL: NAD  HEAD:  Atraumatic  EYES: EOMI, PERRLA, conjunctiva and sclera clear  NECK: Supple, No JVD, no cervical LAD  CHEST/LUNG: crackles at R lung base with decreased air movement; No wheeze, respirations nonlabored on nasal cannula  HEART: Regular rate and rhythm; No murmurs, rubs, or gallops  ABDOMEN: Soft, Nontender, Nondistended; Bowel sounds present  EXTREMITIES:  2+ Peripheral Pulses, No clubbing, cyanosis, or edema  NEURO/PSYCH: AAOx2 (self, hospital), nonfocal  SKIN: No rashes or lesions      LABS:                          10.6   11.4  )-----------( 177      ( 01 Jun 2018 06:43 )             32.2     CBC Full  -  ( 01 Jun 2018 06:43 )  WBC Count : 11.4 K/uL  Hemoglobin : 10.6 g/dL  Hematocrit : 32.2 %  Platelet Count - Automated : 177 K/uL  Mean Cell Volume : 92.4 fl  Mean Cell Hemoglobin : 30.5 pg  Mean Cell Hemoglobin Concentration : 33.0 gm/dL  Auto Neutrophil # : x  Auto Lymphocyte # : x  Auto Monocyte # : x  Auto Eosinophil # : x  Auto Basophil # : x  Auto Neutrophil % : x  Auto Lymphocyte % : x  Auto Monocyte % : x  Auto Eosinophil % : x  Auto Basophil % : x    06-01    134<L>  |  99  |  26<H>  ----------------------------<  122<H>  4.0   |  24  |  0.80    Ca    8.8      01 Jun 2018 06:42  Phos  2.7     06-01  Mg     1.9     06-01    TPro  6.5  /  Alb  3.1<L>  /  TBili  1.0  /  DBili  x   /  AST  25  /  ALT  24  /  AlkPhos  115  05-31    Creatinine Trend: 0.80<--, 0.99<--, 1.02<--, 1.07<--  LIVER FUNCTIONS - ( 31 May 2018 07:07 )  Alb: 3.1 g/dL / Pro: 6.5 g/dL / ALK PHOS: 115 U/L / ALT: 24 U/L / AST: 25 U/L / GGT: x                       MICROBIOLOGY:    Culture - Blood (collected 31 May 2018 08:30)  Source: .Blood Blood-Venous  Preliminary Report (01 Jun 2018 09:01):    No growth to date.    Culture - Blood (collected 31 May 2018 08:30)  Source: .Blood Blood-Peripheral  Preliminary Report (01 Jun 2018 09:01):    No growth to date.      Culture - Urine (collected 29 May 2018 16:32)  Source: .Urine Catheterized  Final Report (31 May 2018 17:29):    10,000 - 49,000 CFU/mL Escherichia coli  Organism: Escherichia coli (31 May 2018 17:29)  Organism: Escherichia coli (31 May 2018 17:29)    Culture - Blood (collected 29 May 2018 16:22)  Source: .Blood Blood  Gram Stain (30 May 2018 14:16):    Growth in aerobic bottle: Gram Negative Coccobacilli    Growth in anaerobic bottle: Gram Negative Coccobacilli  Final Report (01 Jun 2018 09:03):    Growth in aerobic and anaerobic bottles: Haemophilus influenzae  Organism: Haemophilus influenzae (01 Jun 2018 09:03)  Organism: Haemophilus influenzae (01 Jun 2018 09:03)    Culture - Blood (collected 29 May 2018 16:22)  Source: .Blood Blood  Gram Stain (30 May 2018 16:50):    Growth in aerobic bottle: Gram Negative Coccobacilli    Growth in anaerobic bottle: Gram Negative Coccobacilli    Gram Positive Cocci in Pairs and Chains  Final Report (01 Jun 2018 09:07):    Growth in aerobic and anaerobic bottles: Haemophilus influenzae    See previous culture [id number]    Growth in anaerobic bottle: Alpha hemolytic strep    (not Strep. pneumoniae or Enterococcus)    Single set isolate, possible contaminant. Contact    Microbiology if susceptibility testing clinically    indicated.    "Due to technical problems, Proteus sp. will Not be reported as part of    the BCID panel until further notice"    ***Blood Panel PCR results on this specimen are available    approximately 3 hours after the Gram stain result.***    Gram stain, PCR, and/or culture results may not always    correspond due to difference in methodologies.    ************************************************************    This PCR assay was performed using [employer].    The following targets are tested for: Enterococcus,    vancomycin resistant enterococci, Listeria monocytogenes,    coagulase negative staphylococci, S. aureus,    methicillin resistant S. aureus, Streptococcus agalactiae    (Group B), S. pneumoniae, S. pyogenes (Group A),    Acinetobacter baumannii, Enterobacter cloacae, E. coli,    Klebsiella oxytoca, K. pneumoniae, Proteus sp.,    Serratia marcescens, Haemophilus influenzae,    Neisseria meningitidis, Pseudomonas aeruginosa, Candida    albicans, C. glabrata, C krusei, C parapsilosis,    C. tropicalis and the KPC resistance gene.  Organism: Blood Culture PCR  Blood Culture PCR (01 Jun 2018 09:07)  Organism: Blood Culture PCR (01 Jun 2018 09:07)  Organism: Blood Culture PCR (01 Jun 2018 09:07)      RADIOLOGY & ADDITIONAL TESTS:  < from: Transthoracic Echocardiogram (05.31.18 @ 20:38) >  Conclusions: LVEF 70-75%  1. Mitral annular calcification and calcified mitral  leaflets with normal diastolic opening. Mild mitral  regurgitation.  2. Calcified trileaflet aortic valve with decreased  opening. Peak transaortic valve gradient equals 96 mm Hg,  mean transaortic valve gradient equals 57 mm Hg, estimated  aortic valve area equals 0.8 sqcm (by continuity equation),  aortic valve velocity time integral equals 115 cm,  consistent with severe aortic stenosis. Mild aortic  regurgitation.  3. Moderately dilated left atrium.  LA volume index = 43  cc/m2.  4. Mild concentric left ventricular hypertrophy.  5. Normal left ventricular systolic function. No segmental  wall motion abnormalities.  6. Normal right ventricular size and function.  7. Estimated right ventricular systolic pressure equals 39  mm Hg, assuming right atrial pressure equals 8 mm Hg,  consistent with borderline pulmonary hypertension.  *** No previous Echo exam.    < end of copied text >       CONSULTS: cards, ID Internal Medicine Progress Note    Cindy Madrid, PGY1  Internal Medicine, team 1  Pager [phone] / [phone]  After 7PM on weekdays and 12PM on weekends, please page #[phone]    Patient is a 85y old  Female who presents with a chief complaint of Fever (29 May 2018 18:29)      SUBJECTIVE / OVERNIGHT EVENTS: On 4L NC. Pt had smear of brown stool this AM. Denies SOB, CP, pain.    MEDICATIONS  (STANDING):  aspirin enteric coated 81 milliGRAM(s) Oral daily  busPIRone 15 milliGRAM(s) Oral two times a day  cefTRIAXone   IVPB 1 Gram(s) IV Intermittent every 24 hours  cyanocobalamin 100 MICROGram(s) Oral daily  docusate sodium 100 milliGRAM(s) Oral two times a day  gabapentin 100 milliGRAM(s) Oral three times a day  heparin  Injectable 5000 Unit(s) SubCutaneous every 8 hours  melatonin 3 milliGRAM(s) Oral at bedtime  metoprolol succinate ER 25 milliGRAM(s) Oral daily  nystatin Powder 1 Application(s) Topical two times a day  pantoprazole    Tablet 40 milliGRAM(s) Oral before breakfast  polyethylene glycol 3350 17 Gram(s) Oral daily  senna 2 Tablet(s) Oral at bedtime  sertraline 12.5 milliGRAM(s) Oral daily    MEDICATIONS  (PRN):      Vital Signs Last 24 Hrs  T(C): 36.4 (02 Jun 2018 04:38), Max: 37.7 (01 Jun 2018 21:28)  T(F): 97.6 (02 Jun 2018 04:38), Max: 99.8 (01 Jun 2018 21:28)  HR: 74 (02 Jun 2018 07:36) (61 - 78)  BP: 135/72 (02 Jun 2018 04:38) (112/70 - 135/72)  BP(mean): --  RR: 18 (02 Jun 2018 06:33) (18 - 18)  SpO2: 98% (02 Jun 2018 07:36) (92% - 98%)    CAPILLARY BLOOD GLUCOSE          I&O's Summary    01 Jun 2018 07:01  -  02 Jun 2018 07:00  --------------------------------------------------------  IN: 600 mL / OUT: 0 mL / NET: 600 mL    02 Jun 2018 07:01  -  02 Jun 2018 10:03  --------------------------------------------------------  IN: 120 mL / OUT: 200 mL / NET: -80 mL      PHYSICAL EXAM  GENERAL: NAD  HEAD:  Atraumatic  EYES: EOMI, PERRLA, conjunctiva and sclera clear  NECK: Supple, No JVD, no cervical LAD  CHEST/LUNG: crackles at R lung base with decreased air movement; No wheeze, respirations nonlabored on nasal cannula  HEART: Regular rate and rhythm; No murmurs, rubs, or gallops  ABDOMEN: Soft, Nontender, Nondistended; Bowel sounds present  EXTREMITIES:  2+ Peripheral Pulses, No clubbing, cyanosis, or edema  NEURO/PSYCH: AAOx2 (self, hospital), nonfocal  SKIN: No rashes or lesions      LABS:                         9.9    10.8  )-----------( 207      ( 02 Jun 2018 07:02 )             30.8     CBC Full  -  ( 02 Jun 2018 07:02 )  WBC Count : 10.8 K/uL  Hemoglobin : 9.9 g/dL  Hematocrit : 30.8 %  Platelet Count - Automated : 207 K/uL  Mean Cell Volume : 93.1 fl  Mean Cell Hemoglobin : 29.9 pg  Mean Cell Hemoglobin Concentration : 32.1 gm/dL  Auto Neutrophil # : x  Auto Lymphocyte # : x  Auto Monocyte # : x  Auto Eosinophil # : x  Auto Basophil # : x  Auto Neutrophil % : x  Auto Lymphocyte % : x  Auto Monocyte % : x  Auto Eosinophil % : x  Auto Basophil % : x    06-02    135  |  99  |  22  ----------------------------<  122<H>  4.0   |  24  |  0.80    Ca    8.7      02 Jun 2018 07:02  Phos  2.6     06-02  Mg     1.9     06-02      Creatinine Trend: 0.80<--, 0.80<--, 0.99<--, 1.02<--, 1.07<--          MICROBIOLOGY:    Culture - Blood (collected 31 May 2018 08:30)  Source: .Blood Blood-Venous  Preliminary Report (01 Jun 2018 09:01):    No growth to date.    Culture - Blood (collected 31 May 2018 08:30)  Source: .Blood Blood-Peripheral  Preliminary Report (01 Jun 2018 09:01):    No growth to date.      Culture - Urine (collected 29 May 2018 16:32)  Source: .Urine Catheterized  Final Report (31 May 2018 17:29):    10,000 - 49,000 CFU/mL Escherichia coli  Organism: Escherichia coli (31 May 2018 17:29)  Organism: Escherichia coli (31 May 2018 17:29)    Culture - Blood (collected 29 May 2018 16:22)  Source: .Blood Blood  Gram Stain (30 May 2018 14:16):    Growth in aerobic bottle: Gram Negative Coccobacilli    Growth in anaerobic bottle: Gram Negative Coccobacilli  Final Report (01 Jun 2018 09:03):    Growth in aerobic and anaerobic bottles: Haemophilus influenzae  Organism: Haemophilus influenzae (01 Jun 2018 09:03)  Organism: Haemophilus influenzae (01 Jun 2018 09:03)    Culture - Blood (collected 29 May 2018 16:22)  Source: .Blood Blood  Gram Stain (30 May 2018 16:50):    Growth in aerobic bottle: Gram Negative Coccobacilli    Growth in anaerobic bottle: Gram Negative Coccobacilli    Gram Positive Cocci in Pairs and Chains  Final Report (01 Jun 2018 09:07):    Growth in aerobic and anaerobic bottles: Haemophilus influenzae    See previous culture [id number]    Growth in anaerobic bottle: Alpha hemolytic strep    (not Strep. pneumoniae or Enterococcus)    Single set isolate, possible contaminant. Contact    Microbiology if susceptibility testing clinically    indicated.    "Due to technical problems, Proteus sp. will Not be reported as part of    the BCID panel until further notice"    ***Blood Panel PCR results on this specimen are available    approximately 3 hours after the Gram stain result.***    Gram stain, PCR, and/or culture results may not always    correspond due to difference in methodologies.    ************************************************************    This PCR assay was performed using [employer].    The following targets are tested for: Enterococcus,    vancomycin resistant enterococci, Listeria monocytogenes,    coagulase negative staphylococci, S. aureus,    methicillin resistant S. aureus, Streptococcus agalactiae    (Group B), S. pneumoniae, S. pyogenes (Group A),    Acinetobacter baumannii, Enterobacter cloacae, E. coli,    Klebsiella oxytoca, K. pneumoniae, Proteus sp.,    Serratia marcescens, Haemophilus influenzae,    Neisseria meningitidis, Pseudomonas aeruginosa, Candida    albicans, C. glabrata, C krusei, C parapsilosis,    C. tropicalis and the KPC resistance gene.  Organism: Blood Culture PCR  Blood Culture PCR (01 Jun 2018 09:07)  Organism: Blood Culture PCR (01 Jun 2018 09:07)  Organism: Blood Culture PCR (01 Jun 2018 09:07)      RADIOLOGY & ADDITIONAL TESTS:  < from: Transthoracic Echocardiogram (05.31.18 @ 20:38) >  Conclusions: LVEF 70-75%  1. Mitral annular calcification and calcified mitral  leaflets with normal diastolic opening. Mild mitral  regurgitation.  2. Calcified trileaflet aortic valve with decreased  opening. Peak transaortic valve gradient equals 96 mm Hg,  mean transaortic valve gradient equals 57 mm Hg, estimated  aortic valve area equals 0.8 sqcm (by continuity equation),  aortic valve velocity time integral equals 115 cm,  consistent with severe aortic stenosis. Mild aortic  regurgitation.  3. Moderately dilated left atrium.  LA volume index = 43  cc/m2.  4. Mild concentric left ventricular hypertrophy.  5. Normal left ventricular systolic function. No segmental  wall motion abnormalities.  6. Normal right ventricular size and function.  7. Estimated right ventricular systolic pressure equals 39  mm Hg, assuming right atrial pressure equals 8 mm Hg,  consistent with borderline pulmonary hypertension.  *** No previous Echo exam.    < end of copied text >       CONSULTS: cards, ID

## 2018-06-02 NOTE — PROGRESS NOTE ADULT - PROBLEM SELECTOR PLAN 9
Not a TAVR candidate, last in 2012 with hyperdynamic LV func, mild AS. Outpatient follow up  -TTE 5/31: LVEF 70-75%, no segmental wall motion abnormalities, severe AS, mild AR, nl LV and RV function  -Continue Toprol 25

## 2018-06-02 NOTE — PROGRESS NOTE ADULT - ATTENDING COMMENTS
feeling improved  cont abx per ID, f/u re: course of abx for bacteremia/pna  dispo back to nursing home hopefully monday

## 2018-06-02 NOTE — PROGRESS NOTE ADULT - PROBLEM SELECTOR PLAN 2
Round to have RLL PNA associated with R pleural effusion concerning for aspiration PNA leading to acute hypoxic respiratory failure requiring BIPAP, improved to nasal cannula. Aspiration possibly related to progessiva dementia vs lethargy due to sepsis. RVP neg  -Continue ceftriaxone  -Titrate oxygen to maintain SpO2 >92%, currently on 4L NC

## 2018-06-02 NOTE — PROGRESS NOTE ADULT - PROBLEM SELECTOR PLAN 1
Haemophilus influenzae bacteremia likely from CAP. 1/4 bottles also growing Alpha-hemolytic Strep species likely contaminant  -Repeat BCx no growth  -Increase to CTX 2gm daily   -TTE no sign of vegetation  -Appreciate ID recs Haemophilus influenzae bacteremia likely from CAP. 1/4 bottles also growing Alpha-hemolytic Strep species likely contaminant  -Repeat BCx no growth  -zosyn and vanc (5/29 - 5/30), CTX (5/30 - )   -TTE no sign of vegetation  -Appreciate ID recs

## 2018-06-03 LAB
ANION GAP SERPL CALC-SCNC: 14 MMOL/L — SIGNIFICANT CHANGE UP (ref 5–17)
BUN SERPL-MCNC: 21 MG/DL — SIGNIFICANT CHANGE UP (ref 7–23)
CALCIUM SERPL-MCNC: 8.7 MG/DL — SIGNIFICANT CHANGE UP (ref 8.4–10.5)
CHLORIDE SERPL-SCNC: 98 MMOL/L — SIGNIFICANT CHANGE UP (ref 96–108)
CO2 SERPL-SCNC: 23 MMOL/L — SIGNIFICANT CHANGE UP (ref 22–31)
CREAT SERPL-MCNC: 0.74 MG/DL — SIGNIFICANT CHANGE UP (ref 0.5–1.3)
GLUCOSE SERPL-MCNC: 124 MG/DL — HIGH (ref 70–99)
HCT VFR BLD CALC: 34 % — LOW (ref 34.5–45)
HGB BLD-MCNC: 10.8 G/DL — LOW (ref 11.5–15.5)
MAGNESIUM SERPL-MCNC: 1.9 MG/DL — SIGNIFICANT CHANGE UP (ref 1.6–2.6)
MCHC RBC-ENTMCNC: 29.2 PG — SIGNIFICANT CHANGE UP (ref 27–34)
MCHC RBC-ENTMCNC: 31.8 GM/DL — LOW (ref 32–36)
MCV RBC AUTO: 92.1 FL — SIGNIFICANT CHANGE UP (ref 80–100)
PHOSPHATE SERPL-MCNC: 3.1 MG/DL — SIGNIFICANT CHANGE UP (ref 2.5–4.5)
PLATELET # BLD AUTO: 237 K/UL — SIGNIFICANT CHANGE UP (ref 150–400)
POTASSIUM SERPL-MCNC: 4.3 MMOL/L — SIGNIFICANT CHANGE UP (ref 3.5–5.3)
POTASSIUM SERPL-SCNC: 4.3 MMOL/L — SIGNIFICANT CHANGE UP (ref 3.5–5.3)
RBC # BLD: 3.7 M/UL — LOW (ref 3.8–5.2)
RBC # FLD: 12.3 % — SIGNIFICANT CHANGE UP (ref 10.3–14.5)
SODIUM SERPL-SCNC: 135 MMOL/L — SIGNIFICANT CHANGE UP (ref 135–145)
WBC # BLD: 10.6 K/UL — HIGH (ref 3.8–10.5)
WBC # FLD AUTO: 10.6 K/UL — HIGH (ref 3.8–10.5)

## 2018-06-03 PROCEDURE — 99233 SBSQ HOSP IP/OBS HIGH 50: CPT | Mod: GC

## 2018-06-03 RX ADMIN — HEPARIN SODIUM 5000 UNIT(S): 5000 INJECTION INTRAVENOUS; SUBCUTANEOUS at 21:47

## 2018-06-03 RX ADMIN — Medication 100 MILLIGRAM(S): at 06:25

## 2018-06-03 RX ADMIN — HEPARIN SODIUM 5000 UNIT(S): 5000 INJECTION INTRAVENOUS; SUBCUTANEOUS at 13:15

## 2018-06-03 RX ADMIN — GABAPENTIN 100 MILLIGRAM(S): 400 CAPSULE ORAL at 13:15

## 2018-06-03 RX ADMIN — SENNA PLUS 2 TABLET(S): 8.6 TABLET ORAL at 21:46

## 2018-06-03 RX ADMIN — PANTOPRAZOLE SODIUM 40 MILLIGRAM(S): 20 TABLET, DELAYED RELEASE ORAL at 06:25

## 2018-06-03 RX ADMIN — GABAPENTIN 100 MILLIGRAM(S): 400 CAPSULE ORAL at 06:25

## 2018-06-03 RX ADMIN — NYSTATIN CREAM 1 APPLICATION(S): 100000 CREAM TOPICAL at 17:24

## 2018-06-03 RX ADMIN — Medication 3 MILLIGRAM(S): at 21:46

## 2018-06-03 RX ADMIN — Medication 81 MILLIGRAM(S): at 10:38

## 2018-06-03 RX ADMIN — PREGABALIN 100 MICROGRAM(S): 225 CAPSULE ORAL at 10:38

## 2018-06-03 RX ADMIN — Medication 15 MILLIGRAM(S): at 17:21

## 2018-06-03 RX ADMIN — Medication 25 MILLIGRAM(S): at 06:25

## 2018-06-03 RX ADMIN — Medication 15 MILLIGRAM(S): at 06:25

## 2018-06-03 RX ADMIN — POLYETHYLENE GLYCOL 3350 17 GRAM(S): 17 POWDER, FOR SOLUTION ORAL at 10:38

## 2018-06-03 RX ADMIN — GABAPENTIN 100 MILLIGRAM(S): 400 CAPSULE ORAL at 21:46

## 2018-06-03 RX ADMIN — Medication 100 MILLIGRAM(S): at 17:22

## 2018-06-03 RX ADMIN — SERTRALINE 12.5 MILLIGRAM(S): 25 TABLET, FILM COATED ORAL at 10:37

## 2018-06-03 RX ADMIN — NYSTATIN CREAM 1 APPLICATION(S): 100000 CREAM TOPICAL at 06:35

## 2018-06-03 RX ADMIN — HEPARIN SODIUM 5000 UNIT(S): 5000 INJECTION INTRAVENOUS; SUBCUTANEOUS at 06:26

## 2018-06-03 NOTE — PROGRESS NOTE ADULT - PROBLEM SELECTOR PLAN 1
Haemophilus influenzae bacteremia likely from CAP. 1/4 bottles also growing Alpha-hemolytic Strep species likely contaminant  -Repeat BCx no growth  -zosyn and vanc (5/29 - 5/30), CTX (5/30 - )   -TTE no sign of vegetation  -Appreciate ID recs

## 2018-06-03 NOTE — PROVIDER CONTACT NOTE (OTHER) - BACKGROUND
Patient admitted for Acute Respiratory Distress, Non-ST elevation Myocardial Infarction, Sepsis, Aortic Stenosis, Dementia, Diabetes, Hypertension, Pneumonia.
Patient with admitting diagnosis of acute respiratory distress with BIPAP at nights.

## 2018-06-03 NOTE — PROVIDER CONTACT NOTE (OTHER) - ASSESSMENT
Patient's Lab Culture - Blood Final Results from 29-May-2018: Growth in aerobic bottle: Gram Negative Coccobacilli. Growth in anaerobic bottle: Gram Negative Coccobacilli. Patient has an active order for Rocephin IVPB.
Patient A&Ox3, refusing BIPAP, O2 sat on nasal cannula 95%. No acute distress noted or reported.
Patient's Lab Culture - Blood Final Results from 29-May-2018: Growth in aerobic bottle: Gram Negative Coccobacilli. Growth in anaerobic bottle: Gram Negative Coccobacilli. Gram Positive Cocci in Pairs and Chains. Patient has an active order for Rocephin IVPB.
Patient's Lab Culture - Blood from 29-May-2018 Results: Growth in aerobic bottle: Haemophilus influenzae. Growth in anaerobic bottle: Alpha hemolytic strep  (not Strep. pneumoniae or Enterococcus). Single set isolate, possible contaminant. Patient has an active order for Rocehpin IVPB 1 Gram every 24 hours.
Patient's Lab Results: Creatine Kinase, Serum 177. CKMB Units 6.0. Troponin T, Serum 0.16. Patient free from symptomatic arrhythmias and respiratory distress. Patient denies chest pain and shortness of breath.

## 2018-06-03 NOTE — PROGRESS NOTE ADULT - PROBLEM SELECTOR PLAN 2
Round to have RLL PNA associated with R pleural effusion concerning for aspiration PNA leading to acute hypoxic respiratory failure requiring BIPAP, improved to nasal cannula. Aspiration possibly related to progessiva dementia vs lethargy due to sepsis. RVP neg  -Continue ceftriaxone  -Titrate oxygen to maintain SpO2 >92%, currently on 4L NC Round to have RLL PNA associated with R pleural effusion concerning for aspiration PNA leading to acute hypoxic respiratory failure requiring BIPAP, improved to nasal cannula. Aspiration possibly related to progessiva dementia vs lethargy due to sepsis. RVP neg  -Continue ceftriaxone  -Titrate oxygen to maintain SpO2 >92%, currently on 2L NC

## 2018-06-03 NOTE — PROVIDER CONTACT NOTE (OTHER) - ACTION/TREATMENT ORDERED:
Continue to monitor.
MD aware and reviewed all of patient's orders and lab results. Infectious Disease was consulted. No new orders at this time.
MD aware & reviewed all of patient's Lab Culture - Blood Results, all other lab results and orders. NP increased dosage of Rocephin IVPB.
MD aware and reviewed all of patient's lab results, orders and plan of care. MD at patient's bedside assessing patient. No new orders at this time. Continue to monitor.
MD aware and reviewed all of patient's orders and lab results. Infectious Disease was consulted. No new orders at this time.

## 2018-06-03 NOTE — PROVIDER CONTACT NOTE (OTHER) - SITUATION
Patient's Lab Culture - Blood from 29-May-2018 resulted.
Patient ordered for BIPAP but refusing.
Patient's Lab Culture - Blood Resulted.
Patient's Lab Culture - Blood from 29-May-2018 Resulted.
Patient's Labs: Creatine Kinase, CKMB Units and Troponin T resulted.

## 2018-06-03 NOTE — PROGRESS NOTE ADULT - SUBJECTIVE AND OBJECTIVE BOX
Internal Medicine Progress Note    Cindy Madrid, PGY1  Internal Medicine, team 1  Pager [phone] / [phone]  After 7PM on weekdays and 12PM on weekends, please page #[phone]    Patient is a 85y old  Female who presents with a chief complaint of Fever (29 May 2018 18:29)      SUBJECTIVE / OVERNIGHT EVENTS: Titrated 4L NC to room air. Refused BIPAP overnight after a couple hours.    MEDICATIONS  (STANDING):  aspirin enteric coated 81 milliGRAM(s) Oral daily  busPIRone 15 milliGRAM(s) Oral two times a day  cefTRIAXone   IVPB 1 Gram(s) IV Intermittent every 24 hours  cyanocobalamin 100 MICROGram(s) Oral daily  docusate sodium 100 milliGRAM(s) Oral two times a day  gabapentin 100 milliGRAM(s) Oral three times a day  heparin  Injectable 5000 Unit(s) SubCutaneous every 8 hours  melatonin 3 milliGRAM(s) Oral at bedtime  metoprolol succinate ER 25 milliGRAM(s) Oral daily  nystatin Powder 1 Application(s) Topical two times a day  pantoprazole    Tablet 40 milliGRAM(s) Oral before breakfast  polyethylene glycol 3350 17 Gram(s) Oral daily  senna 2 Tablet(s) Oral at bedtime  sertraline 12.5 milliGRAM(s) Oral daily    MEDICATIONS  (PRN):      Vital Signs Last 24 Hrs  T(C): 37.1 (03 Jun 2018 05:04), Max: 37.1 (03 Jun 2018 05:04)  T(F): 98.7 (03 Jun 2018 05:04), Max: 98.7 (03 Jun 2018 05:04)  HR: 72 (03 Jun 2018 05:27) (55 - 88)  BP: 145/75 (03 Jun 2018 05:04) (110/61 - 145/75)  BP(mean): --  RR: 18 (03 Jun 2018 05:04) (17 - 18)  SpO2: 97% (03 Jun 2018 05:27) (92% - 98%)    CAPILLARY BLOOD GLUCOSE        I&O's Summary    02 Jun 2018 07:01  -  03 Jun 2018 07:00  --------------------------------------------------------  IN: 410 mL / OUT: 400 mL / NET: 10 mL          PHYSICAL EXAM  GENERAL: NAD  HEAD:  Atraumatic  EYES: EOMI, PERRLA, conjunctiva and sclera clear  NECK: Supple, No JVD, no cervical LAD  CHEST/LUNG: crackles at R lung base with decreased air movement; No wheeze, respirations nonlabored on nasal cannula  HEART: Regular rate and rhythm; No murmurs, rubs, or gallops  ABDOMEN: Soft, Nontender, Nondistended; Bowel sounds present  EXTREMITIES:  2+ Peripheral Pulses, No clubbing, cyanosis, or edema  NEURO/PSYCH: AAOx2 (self, hospital), nonfocal  SKIN: No rashes or lesions      LABS:                        10.8   10.6  )-----------( 237      ( 03 Jun 2018 06:28 )             34.0     CBC Full  -  ( 03 Jun 2018 06:28 )  WBC Count : 10.6 K/uL  Hemoglobin : 10.8 g/dL  Hematocrit : 34.0 %  Platelet Count - Automated : 237 K/uL  Mean Cell Volume : 92.1 fl  Mean Cell Hemoglobin : 29.2 pg  Mean Cell Hemoglobin Concentration : 31.8 gm/dL  Auto Neutrophil # : x  Auto Lymphocyte # : x  Auto Monocyte # : x  Auto Eosinophil # : x  Auto Basophil # : x  Auto Neutrophil % : x  Auto Lymphocyte % : x  Auto Monocyte % : x  Auto Eosinophil % : x  Auto Basophil % : x    06-03    135  |  98  |  21  ----------------------------<  124<H>  4.3   |  23  |  0.74    Ca    8.7      03 Jun 2018 06:27  Phos  3.1     06-03  Mg     1.9     06-03      Creatinine Trend: 0.74<--, 0.80<--, 0.80<--, 0.99<--, 1.02<--, 1.07<--                MICROBIOLOGY:    Culture - Blood (collected 31 May 2018 08:30)  Source: .Blood Blood-Venous  Preliminary Report (01 Jun 2018 09:01):    No growth to date.    Culture - Blood (collected 31 May 2018 08:30)  Source: .Blood Blood-Peripheral  Preliminary Report (01 Jun 2018 09:01):    No growth to date.      Culture - Urine (collected 29 May 2018 16:32)  Source: .Urine Catheterized  Final Report (31 May 2018 17:29):    10,000 - 49,000 CFU/mL Escherichia coli  Organism: Escherichia coli (31 May 2018 17:29)  Organism: Escherichia coli (31 May 2018 17:29)    Culture - Blood (collected 29 May 2018 16:22)  Source: .Blood Blood  Gram Stain (30 May 2018 14:16):    Growth in aerobic bottle: Gram Negative Coccobacilli    Growth in anaerobic bottle: Gram Negative Coccobacilli  Final Report (01 Jun 2018 09:03):    Growth in aerobic and anaerobic bottles: Haemophilus influenzae  Organism: Haemophilus influenzae (01 Jun 2018 09:03)  Organism: Haemophilus influenzae (01 Jun 2018 09:03)    Culture - Blood (collected 29 May 2018 16:22)  Source: .Blood Blood  Gram Stain (30 May 2018 16:50):    Growth in aerobic bottle: Gram Negative Coccobacilli    Growth in anaerobic bottle: Gram Negative Coccobacilli    Gram Positive Cocci in Pairs and Chains  Final Report (01 Jun 2018 09:07):    Growth in aerobic and anaerobic bottles: Haemophilus influenzae    See previous culture [id number]    Growth in anaerobic bottle: Alpha hemolytic strep    (not Strep. pneumoniae or Enterococcus)    Single set isolate, possible contaminant. Contact    Microbiology if susceptibility testing clinically    indicated.    "Due to technical problems, Proteus sp. will Not be reported as part of    the BCID panel until further notice"    ***Blood Panel PCR results on this specimen are available    approximately 3 hours after the Gram stain result.***    Gram stain, PCR, and/or culture results may not always    correspond due to difference in methodologies.    ************************************************************    This PCR assay was performed using [employer].    The following targets are tested for: Enterococcus,    vancomycin resistant enterococci, Listeria monocytogenes,    coagulase negative staphylococci, S. aureus,    methicillin resistant S. aureus, Streptococcus agalactiae    (Group B), S. pneumoniae, S. pyogenes (Group A),    Acinetobacter baumannii, Enterobacter cloacae, E. coli,    Klebsiella oxytoca, K. pneumoniae, Proteus sp.,    Serratia marcescens, Haemophilus influenzae,    Neisseria meningitidis, Pseudomonas aeruginosa, Candida    albicans, C. glabrata, C krusei, C parapsilosis,    C. tropicalis and the KPC resistance gene.  Organism: Blood Culture PCR  Blood Culture PCR (01 Jun 2018 09:07)  Organism: Blood Culture PCR (01 Jun 2018 09:07)  Organism: Blood Culture PCR (01 Jun 2018 09:07)      RADIOLOGY & ADDITIONAL TESTS:  < from: Transthoracic Echocardiogram (05.31.18 @ 20:38) >  Conclusions: LVEF 70-75%  1. Mitral annular calcification and calcified mitral  leaflets with normal diastolic opening. Mild mitral  regurgitation.  2. Calcified trileaflet aortic valve with decreased  opening. Peak transaortic valve gradient equals 96 mm Hg,  mean transaortic valve gradient equals 57 mm Hg, estimated  aortic valve area equals 0.8 sqcm (by continuity equation),  aortic valve velocity time integral equals 115 cm,  consistent with severe aortic stenosis. Mild aortic  regurgitation.  3. Moderately dilated left atrium.  LA volume index = 43  cc/m2.  4. Mild concentric left ventricular hypertrophy.  5. Normal left ventricular systolic function. No segmental  wall motion abnormalities.  6. Normal right ventricular size and function.  7. Estimated right ventricular systolic pressure equals 39  mm Hg, assuming right atrial pressure equals 8 mm Hg,  consistent with borderline pulmonary hypertension.  *** No previous Echo exam.       CONSULTS: cards, ID Internal Medicine Progress Note    Cindy Madrid, PGY1  Internal Medicine, team 1  Pager [phone] / [phone]  After 7PM on weekdays and 12PM on weekends, please page #[phone]    Patient is a 85y old  Female who presents with a chief complaint of Fever (29 May 2018 18:29)      SUBJECTIVE / OVERNIGHT EVENTS: Titrated 4L NC to room air. Refused BIPAP overnight after a couple hours. Try to titrate to RA today. Pt has no complaints.    MEDICATIONS  (STANDING):  aspirin enteric coated 81 milliGRAM(s) Oral daily  busPIRone 15 milliGRAM(s) Oral two times a day  cefTRIAXone   IVPB 1 Gram(s) IV Intermittent every 24 hours  cyanocobalamin 100 MICROGram(s) Oral daily  docusate sodium 100 milliGRAM(s) Oral two times a day  gabapentin 100 milliGRAM(s) Oral three times a day  heparin  Injectable 5000 Unit(s) SubCutaneous every 8 hours  melatonin 3 milliGRAM(s) Oral at bedtime  metoprolol succinate ER 25 milliGRAM(s) Oral daily  nystatin Powder 1 Application(s) Topical two times a day  pantoprazole    Tablet 40 milliGRAM(s) Oral before breakfast  polyethylene glycol 3350 17 Gram(s) Oral daily  senna 2 Tablet(s) Oral at bedtime  sertraline 12.5 milliGRAM(s) Oral daily    MEDICATIONS  (PRN):      Vital Signs Last 24 Hrs  T(C): 37.3 (03 Jun 2018 12:38), Max: 37.3 (03 Jun 2018 12:38)  T(F): 99.1 (03 Jun 2018 12:38), Max: 99.1 (03 Jun 2018 12:38)  HR: 62 (03 Jun 2018 12:38) (62 - 88)  BP: 118/64 (03 Jun 2018 12:38) (118/64 - 145/75)  BP(mean): --  RR: 18 (03 Jun 2018 12:38) (16 - 18)  SpO2: 94% (03 Jun 2018 12:38) (90% - 98%)    CAPILLARY BLOOD GLUCOSE          I&O's Summary    02 Jun 2018 07:01  -  03 Jun 2018 07:00  --------------------------------------------------------  IN: 410 mL / OUT: 400 mL / NET: 10 mL    03 Jun 2018 07:01  -  03 Jun 2018 15:06  --------------------------------------------------------  IN: 480 mL / OUT: 400 mL / NET: 80 mL      Vital Signs Last 24 Hrs  T(C): 37.1 (03 Jun 2018 05:04), Max: 37.1 (03 Jun 2018 05:04)  T(F): 98.7 (03 Jun 2018 05:04), Max: 98.7 (03 Jun 2018 05:04)  HR: 72 (03 Jun 2018 05:27) (55 - 88)  BP: 145/75 (03 Jun 2018 05:04) (110/61 - 145/75)  BP(mean): --  RR: 18 (03 Jun 2018 05:04) (17 - 18)  SpO2: 97% (03 Jun 2018 05:27) (92% - 98%)    CAPILLARY BLOOD GLUCOSE        I&O's Summary    02 Jun 2018 07:01  -  03 Jun 2018 07:00  --------------------------------------------------------  IN: 410 mL / OUT: 400 mL / NET: 10 mL        PHYSICAL EXAM  GENERAL: NAD  HEAD:  Atraumatic  EYES: EOMI, PERRLA, conjunctiva and sclera clear  NECK: Supple, No JVD, no cervical LAD  CHEST/LUNG: crackles at R lung base with decreased air movement; No wheeze, respirations nonlabored on nasal cannula  HEART: Regular rate and rhythm; No murmurs, rubs, or gallops  ABDOMEN: Soft, Nontender, Nondistended; Bowel sounds present  EXTREMITIES:  2+ Peripheral Pulses, No clubbing, cyanosis, or edema  NEURO/PSYCH: AAOx2 (self, hospital), nonfocal  SKIN: No rashes or lesions      LABS:                         10.8   10.6  )-----------( 237      ( 03 Jun 2018 06:28 )             34.0     CBC Full  -  ( 03 Jun 2018 06:28 )  WBC Count : 10.6 K/uL  Hemoglobin : 10.8 g/dL  Hematocrit : 34.0 %  Platelet Count - Automated : 237 K/uL  Mean Cell Volume : 92.1 fl  Mean Cell Hemoglobin : 29.2 pg  Mean Cell Hemoglobin Concentration : 31.8 gm/dL  Auto Neutrophil # : x  Auto Lymphocyte # : x  Auto Monocyte # : x  Auto Eosinophil # : x  Auto Basophil # : x  Auto Neutrophil % : x  Auto Lymphocyte % : x  Auto Monocyte % : x  Auto Eosinophil % : x  Auto Basophil % : x    06-03    135  |  98  |  21  ----------------------------<  124<H>  4.3   |  23  |  0.74    Ca    8.7      03 Jun 2018 06:27  Phos  3.1     06-03  Mg     1.9     06-03      Creatinine Trend: 0.74<--, 0.80<--, 0.80<--, 0.99<--, 1.02<--, 1.07<--      MICROBIOLOGY:    Culture - Blood (collected 31 May 2018 08:30)  Source: .Blood Blood-Venous  Preliminary Report (01 Jun 2018 09:01):    No growth to date.    Culture - Blood (collected 31 May 2018 08:30)  Source: .Blood Blood-Peripheral  Preliminary Report (01 Jun 2018 09:01):    No growth to date.      Culture - Urine (collected 29 May 2018 16:32)  Source: .Urine Catheterized  Final Report (31 May 2018 17:29):    10,000 - 49,000 CFU/mL Escherichia coli  Organism: Escherichia coli (31 May 2018 17:29)  Organism: Escherichia coli (31 May 2018 17:29)    Culture - Blood (collected 29 May 2018 16:22)  Source: .Blood Blood  Gram Stain (30 May 2018 14:16):    Growth in aerobic bottle: Gram Negative Coccobacilli    Growth in anaerobic bottle: Gram Negative Coccobacilli  Final Report (01 Jun 2018 09:03):    Growth in aerobic and anaerobic bottles: Haemophilus influenzae  Organism: Haemophilus influenzae (01 Jun 2018 09:03)  Organism: Haemophilus influenzae (01 Jun 2018 09:03)    Culture - Blood (collected 29 May 2018 16:22)  Source: .Blood Blood  Gram Stain (30 May 2018 16:50):    Growth in aerobic bottle: Gram Negative Coccobacilli    Growth in anaerobic bottle: Gram Negative Coccobacilli    Gram Positive Cocci in Pairs and Chains  Final Report (01 Jun 2018 09:07):    Growth in aerobic and anaerobic bottles: Haemophilus influenzae    See previous culture [id number]    Growth in anaerobic bottle: Alpha hemolytic strep    (not Strep. pneumoniae or Enterococcus)    Single set isolate, possible contaminant. Contact    Microbiology if susceptibility testing clinically    indicated.    "Due to technical problems, Proteus sp. will Not be reported as part of    the BCID panel until further notice"    ***Blood Panel PCR results on this specimen are available    approximately 3 hours after the Gram stain result.***    Gram stain, PCR, and/or culture results may not always    correspond due to difference in methodologies.    ************************************************************    This PCR assay was performed using [email or web address].    The following targets are tested for: Enterococcus,    vancomycin resistant enterococci, Listeria monocytogenes,    coagulase negative staphylococci, S. aureus,    methicillin resistant S. aureus, Streptococcus agalactiae    (Group B), S. pneumoniae, S. pyogenes (Group A),    Acinetobacter baumannii, Enterobacter cloacae, E. coli,    Klebsiella oxytoca, K. pneumoniae, Proteus sp.,    Serratia marcescens, Haemophilus influenzae,    Neisseria meningitidis, Pseudomonas aeruginosa, Candida    albicans, C. glabrata, C krusei, C parapsilosis,    C. tropicalis and the KPC resistance gene.  Organism: Blood Culture PCR  Blood Culture PCR (01 Jun 2018 09:07)  Organism: Blood Culture PCR (01 Jun 2018 09:07)  Organism: Blood Culture PCR (01 Jun 2018 09:07)      RADIOLOGY & ADDITIONAL TESTS:  < from: Transthoracic Echocardiogram (05.31.18 @ 20:38) >  Conclusions: LVEF 70-75%  1. Mitral annular calcification and calcified mitral  leaflets with normal diastolic opening. Mild mitral  regurgitation.  2. Calcified trileaflet aortic valve with decreased  opening. Peak transaortic valve gradient equals 96 mm Hg,  mean transaortic valve gradient equals 57 mm Hg, estimated  aortic valve area equals 0.8 sqcm (by continuity equation),  aortic valve velocity time integral equals 115 cm,  consistent with severe aortic stenosis. Mild aortic  regurgitation.  3. Moderately dilated left atrium.  LA volume index = 43  cc/m2.  4. Mild concentric left ventricular hypertrophy.  5. Normal left ventricular systolic function. No segmental  wall motion abnormalities.  6. Normal right ventricular size and function.  7. Estimated right ventricular systolic pressure equals 39  mm Hg, assuming right atrial pressure equals 8 mm Hg,  consistent with borderline pulmonary hypertension.  *** No previous Echo exam.       CONSULTS: cards, ID

## 2018-06-04 DIAGNOSIS — J96.01 ACUTE RESPIRATORY FAILURE WITH HYPOXIA: ICD-10-CM

## 2018-06-04 LAB
ANION GAP SERPL CALC-SCNC: 12 MMOL/L — SIGNIFICANT CHANGE UP (ref 5–17)
BUN SERPL-MCNC: 20 MG/DL — SIGNIFICANT CHANGE UP (ref 7–23)
CALCIUM SERPL-MCNC: 8.8 MG/DL — SIGNIFICANT CHANGE UP (ref 8.4–10.5)
CHLORIDE SERPL-SCNC: 97 MMOL/L — SIGNIFICANT CHANGE UP (ref 96–108)
CO2 SERPL-SCNC: 24 MMOL/L — SIGNIFICANT CHANGE UP (ref 22–31)
CREAT SERPL-MCNC: 0.75 MG/DL — SIGNIFICANT CHANGE UP (ref 0.5–1.3)
GLUCOSE SERPL-MCNC: 100 MG/DL — HIGH (ref 70–99)
HCT VFR BLD CALC: 31.2 % — LOW (ref 34.5–45)
HGB BLD-MCNC: 10.6 G/DL — LOW (ref 11.5–15.5)
MAGNESIUM SERPL-MCNC: 2 MG/DL — SIGNIFICANT CHANGE UP (ref 1.6–2.6)
MCHC RBC-ENTMCNC: 31.1 PG — SIGNIFICANT CHANGE UP (ref 27–34)
MCHC RBC-ENTMCNC: 33.8 GM/DL — SIGNIFICANT CHANGE UP (ref 32–36)
MCV RBC AUTO: 92.1 FL — SIGNIFICANT CHANGE UP (ref 80–100)
PHOSPHATE SERPL-MCNC: 3.5 MG/DL — SIGNIFICANT CHANGE UP (ref 2.5–4.5)
PLATELET # BLD AUTO: 263 K/UL — SIGNIFICANT CHANGE UP (ref 150–400)
POTASSIUM SERPL-MCNC: 4.3 MMOL/L — SIGNIFICANT CHANGE UP (ref 3.5–5.3)
POTASSIUM SERPL-SCNC: 4.3 MMOL/L — SIGNIFICANT CHANGE UP (ref 3.5–5.3)
RBC # BLD: 3.39 M/UL — LOW (ref 3.8–5.2)
RBC # FLD: 12.5 % — SIGNIFICANT CHANGE UP (ref 10.3–14.5)
SODIUM SERPL-SCNC: 133 MMOL/L — LOW (ref 135–145)
WBC # BLD: 9.1 K/UL — SIGNIFICANT CHANGE UP (ref 3.8–10.5)
WBC # FLD AUTO: 9.1 K/UL — SIGNIFICANT CHANGE UP (ref 3.8–10.5)

## 2018-06-04 PROCEDURE — 99233 SBSQ HOSP IP/OBS HIGH 50: CPT | Mod: GC

## 2018-06-04 PROCEDURE — 71045 X-RAY EXAM CHEST 1 VIEW: CPT | Mod: 26

## 2018-06-04 RX ORDER — FUROSEMIDE 40 MG
40 TABLET ORAL ONCE
Qty: 0 | Refills: 0 | Status: COMPLETED | OUTPATIENT
Start: 2018-06-04 | End: 2018-06-04

## 2018-06-04 RX ORDER — FUROSEMIDE 40 MG
20 TABLET ORAL ONCE
Qty: 0 | Refills: 0 | Status: DISCONTINUED | OUTPATIENT
Start: 2018-06-04 | End: 2018-06-04

## 2018-06-04 RX ADMIN — Medication 81 MILLIGRAM(S): at 11:10

## 2018-06-04 RX ADMIN — SERTRALINE 12.5 MILLIGRAM(S): 25 TABLET, FILM COATED ORAL at 11:10

## 2018-06-04 RX ADMIN — NYSTATIN CREAM 1 APPLICATION(S): 100000 CREAM TOPICAL at 17:18

## 2018-06-04 RX ADMIN — GABAPENTIN 100 MILLIGRAM(S): 400 CAPSULE ORAL at 13:51

## 2018-06-04 RX ADMIN — Medication 100 MILLIGRAM(S): at 06:14

## 2018-06-04 RX ADMIN — HEPARIN SODIUM 5000 UNIT(S): 5000 INJECTION INTRAVENOUS; SUBCUTANEOUS at 21:31

## 2018-06-04 RX ADMIN — HEPARIN SODIUM 5000 UNIT(S): 5000 INJECTION INTRAVENOUS; SUBCUTANEOUS at 13:51

## 2018-06-04 RX ADMIN — Medication 15 MILLIGRAM(S): at 17:14

## 2018-06-04 RX ADMIN — Medication 15 MILLIGRAM(S): at 06:15

## 2018-06-04 RX ADMIN — PANTOPRAZOLE SODIUM 40 MILLIGRAM(S): 20 TABLET, DELAYED RELEASE ORAL at 06:14

## 2018-06-04 RX ADMIN — Medication 25 MILLIGRAM(S): at 06:14

## 2018-06-04 RX ADMIN — Medication 40 MILLIGRAM(S): at 15:50

## 2018-06-04 RX ADMIN — GABAPENTIN 100 MILLIGRAM(S): 400 CAPSULE ORAL at 06:15

## 2018-06-04 RX ADMIN — PREGABALIN 100 MICROGRAM(S): 225 CAPSULE ORAL at 11:10

## 2018-06-04 RX ADMIN — Medication 100 MILLIGRAM(S): at 17:17

## 2018-06-04 RX ADMIN — GABAPENTIN 100 MILLIGRAM(S): 400 CAPSULE ORAL at 21:31

## 2018-06-04 RX ADMIN — SENNA PLUS 2 TABLET(S): 8.6 TABLET ORAL at 21:31

## 2018-06-04 RX ADMIN — HEPARIN SODIUM 5000 UNIT(S): 5000 INJECTION INTRAVENOUS; SUBCUTANEOUS at 06:15

## 2018-06-04 RX ADMIN — Medication 3 MILLIGRAM(S): at 21:31

## 2018-06-04 RX ADMIN — NYSTATIN CREAM 1 APPLICATION(S): 100000 CREAM TOPICAL at 06:19

## 2018-06-04 NOTE — PROGRESS NOTE ADULT - SUBJECTIVE AND OBJECTIVE BOX
24H hour events: No acute events overnight. No complaints.     MEDICATIONS:  aspirin enteric coated 81 milliGRAM(s) Oral daily  heparin  Injectable 5000 Unit(s) SubCutaneous every 8 hours  metoprolol succinate ER 25 milliGRAM(s) Oral daily    levoFLOXacin  Tablet 250 milliGRAM(s) Oral every 24 hours      busPIRone 15 milliGRAM(s) Oral two times a day  gabapentin 100 milliGRAM(s) Oral three times a day  melatonin 3 milliGRAM(s) Oral at bedtime  sertraline 12.5 milliGRAM(s) Oral daily    docusate sodium 100 milliGRAM(s) Oral two times a day  pantoprazole    Tablet 40 milliGRAM(s) Oral before breakfast  polyethylene glycol 3350 17 Gram(s) Oral daily  senna 2 Tablet(s) Oral at bedtime      cyanocobalamin 100 MICROGram(s) Oral daily  nystatin Powder 1 Application(s) Topical two times a day      REVIEW OF SYSTEMS: Unable to obtain due to Dementia.     PHYSICAL EXAM:  T(C): 36.7 (06-04-18 @ 04:40), Max: 37.3 (06-03-18 @ 12:38)  HR: 64 (06-04-18 @ 04:40) (56 - 64)  BP: 130/78 (06-04-18 @ 04:40) (108/65 - 130/78)  RR: 18 (06-04-18 @ 10:04) (16 - 18)  SpO2: 86% (06-04-18 @ 10:04) (86% - 94%)  Wt(kg): --  I&O's Summary    03 Jun 2018 07:01  -  04 Jun 2018 07:00  --------------------------------------------------------  IN: 530 mL / OUT: 400 mL / NET: 130 mL        Appearance: Normal	  HEENT:   Normal oral mucosa  Cardiovascular: RRR, +High-Pitched Systolic Murmur, No JVD, No edema  Respiratory: Lungs clear to auscultation	  Psychiatry: Mood & affect appropriate  Gastrointestinal:  Soft  Skin: No rashes, No ecchymoses, No cyanosis	  Neurologic: Non-focal  Extremities: No clubbing, cyanosis or edema  Vascular: Peripheral pulses palpable         LABS:	 	    CBC Full  -  ( 04 Jun 2018 06:56 )  WBC Count : 9.1 K/uL  Hemoglobin : 10.6 g/dL  Hematocrit : 31.2 %  Platelet Count - Automated : 263 K/uL  Mean Cell Volume : 92.1 fl  Mean Cell Hemoglobin : 31.1 pg  Mean Cell Hemoglobin Concentration : 33.8 gm/dL  Auto Neutrophil # : x  Auto Lymphocyte # : x  Auto Monocyte # : x  Auto Eosinophil # : x  Auto Basophil # : x  Auto Neutrophil % : x  Auto Lymphocyte % : x  Auto Monocyte % : x  Auto Eosinophil % : x  Auto Basophil % : x    06-04    133<L>  |  97  |  20  ----------------------------<  100<H>  4.3   |  24  |  0.75  06-03    135  |  98  |  21  ----------------------------<  124<H>  4.3   |  23  |  0.74    Ca    8.8      04 Jun 2018 06:55  Ca    8.7      03 Jun 2018 06:27  Phos  3.5     06-04  Phos  3.1     06-03  Mg     2.0     06-04  Mg     1.9     06-03   	    ASSESSMENT/PLAN: 86 y/o woman with h/o Dementia, Severe AS, DM, HTN, HLD who lives in a NH and presented with Fever and Respiratory Distress, with CTNT elevation    1) Type II NSTEMI - occurred in the setting of Sepsis, now appears to be resolved   - continue Aspirin/statin  -- continue metoprolol  -- resume statin  -- TTE  -- may require subsequent CALVIN for endocarditis eval, although PNA appears to be the source of bacteremia  	          Zane Whitt  Cardiology Fellow  Consult Fellow carries in-house phone [phone]) from 7:30 am - 5:00 pm M - F  For non-urgent issues outside of the above time period, please feel free to contact me directly by text or call at [phone] 24H hour events: No acute events overnight. No complaints.     MEDICATIONS:  aspirin enteric coated 81 milliGRAM(s) Oral daily  heparin  Injectable 5000 Unit(s) SubCutaneous every 8 hours  metoprolol succinate ER 25 milliGRAM(s) Oral daily    levoFLOXacin  Tablet 250 milliGRAM(s) Oral every 24 hours      busPIRone 15 milliGRAM(s) Oral two times a day  gabapentin 100 milliGRAM(s) Oral three times a day  melatonin 3 milliGRAM(s) Oral at bedtime  sertraline 12.5 milliGRAM(s) Oral daily    docusate sodium 100 milliGRAM(s) Oral two times a day  pantoprazole    Tablet 40 milliGRAM(s) Oral before breakfast  polyethylene glycol 3350 17 Gram(s) Oral daily  senna 2 Tablet(s) Oral at bedtime      cyanocobalamin 100 MICROGram(s) Oral daily  nystatin Powder 1 Application(s) Topical two times a day      REVIEW OF SYSTEMS: Unable to obtain due to Dementia.     PHYSICAL EXAM:  T(C): 36.7 (06-04-18 @ 04:40), Max: 37.3 (06-03-18 @ 12:38)  HR: 64 (06-04-18 @ 04:40) (56 - 64)  BP: 130/78 (06-04-18 @ 04:40) (108/65 - 130/78)  RR: 18 (06-04-18 @ 10:04) (16 - 18)  SpO2: 86% (06-04-18 @ 10:04) (86% - 94%)  Wt(kg): --  I&O's Summary    03 Jun 2018 07:01  -  04 Jun 2018 07:00  --------------------------------------------------------  IN: 530 mL / OUT: 400 mL / NET: 130 mL        Appearance: Normal	  HEENT:   Normal oral mucosa  Cardiovascular: RRR, +High-Pitched Systolic Murmur, No JVD, No edema  Respiratory: Lungs clear to auscultation	  Psychiatry: Mood & affect appropriate  Gastrointestinal:  Soft  Skin: No rashes, No ecchymoses, No cyanosis	  Neurologic: Non-focal  Extremities: No clubbing, cyanosis or edema  Vascular: Peripheral pulses palpable         LABS:	 	    CBC Full  -  ( 04 Jun 2018 06:56 )  WBC Count : 9.1 K/uL  Hemoglobin : 10.6 g/dL  Hematocrit : 31.2 %  Platelet Count - Automated : 263 K/uL  Mean Cell Volume : 92.1 fl  Mean Cell Hemoglobin : 31.1 pg  Mean Cell Hemoglobin Concentration : 33.8 gm/dL  Auto Neutrophil # : x  Auto Lymphocyte # : x  Auto Monocyte # : x  Auto Eosinophil # : x  Auto Basophil # : x  Auto Neutrophil % : x  Auto Lymphocyte % : x  Auto Monocyte % : x  Auto Eosinophil % : x  Auto Basophil % : x    06-04    133<L>  |  97  |  20  ----------------------------<  100<H>  4.3   |  24  |  0.75  06-03    135  |  98  |  21  ----------------------------<  124<H>  4.3   |  23  |  0.74    Ca    8.8      04 Jun 2018 06:55  Ca    8.7      03 Jun 2018 06:27  Phos  3.5     06-04  Phos  3.1     06-03  Mg     2.0     06-04  Mg     1.9     06-03   	    ASSESSMENT/PLAN: 84 y/o woman with h/o Dementia, Severe AS, DM, HTN, HLD who lives in a NH and presented with Fever and Respiratory Distress, with CTNT elevation    1) Type II NSTEMI - occurred in the setting of Sepsis, now appears to be resolved   - continue Aspirin 81 mg PO Daily, Metoprolol XL 25 mg PO Daily   - would restart statin    2) Severe AS - previously determined not to be a candidate for TAVR	          Zane Whitt  Cardiology Fellow  Consult Fellow carries in-house phone [phone]) from 7:30 am - 5:00 pm M - F  For non-urgent issues outside of the above time period, please feel free to contact me directly by text or call at [phone] 24H hour events: No acute events overnight. No complaints.     MEDICATIONS:  aspirin enteric coated 81 milliGRAM(s) Oral daily  heparin  Injectable 5000 Unit(s) SubCutaneous every 8 hours  metoprolol succinate ER 25 milliGRAM(s) Oral daily    levoFLOXacin  Tablet 250 milliGRAM(s) Oral every 24 hours      busPIRone 15 milliGRAM(s) Oral two times a day  gabapentin 100 milliGRAM(s) Oral three times a day  melatonin 3 milliGRAM(s) Oral at bedtime  sertraline 12.5 milliGRAM(s) Oral daily    docusate sodium 100 milliGRAM(s) Oral two times a day  pantoprazole    Tablet 40 milliGRAM(s) Oral before breakfast  polyethylene glycol 3350 17 Gram(s) Oral daily  senna 2 Tablet(s) Oral at bedtime  cyanocobalamin 100 MICROGram(s) Oral daily  nystatin Powder 1 Application(s) Topical two times a day      REVIEW OF SYSTEMS: Unable to obtain due to Dementia.     PHYSICAL EXAM:  T(C): 36.7 (06-04-18 @ 04:40), Max: 37.3 (06-03-18 @ 12:38)  HR: 64 (06-04-18 @ 04:40) (56 - 64)  BP: 130/78 (06-04-18 @ 04:40) (108/65 - 130/78)  RR: 18 (06-04-18 @ 10:04) (16 - 18)  SpO2: 86% (06-04-18 @ 10:04) (86% - 94%)  Wt(kg): --  I&O's Summary    03 Jun 2018 07:01  -  04 Jun 2018 07:00  --------------------------------------------------------  IN: 530 mL / OUT: 400 mL / NET: 130 mL        Appearance: Normal	  HEENT:   Normal oral mucosa  Cardiovascular: RRR, gr iii/vi harsh late peaking crescendo-decrescendo systolic murmur base to neck, No JVD, No edema  Respiratory: Lungs clear to auscultation	  Psychiatry: Mood & affect appropriate  Gastrointestinal:  Soft  Skin: No rashes, No ecchymoses, No cyanosis	  Neurologic: Non-focal  Extremities: No clubbing, cyanosis or edema  Vascular: Peripheral pulses palpable         LABS:	 	    CBC Full  -  ( 04 Jun 2018 06:56 )  WBC Count : 9.1 K/uL  Hemoglobin : 10.6 g/dL  Hematocrit : 31.2 %  Platelet Count - Automated : 263 K/uL  Mean Cell Volume : 92.1 fl  Mean Cell Hemoglobin : 31.1 pg  Mean Cell Hemoglobin Concentration : 33.8 gm/dL  Auto Neutrophil # : x  Auto Lymphocyte # : x  Auto Monocyte # : x  Auto Eosinophil # : x  Auto Basophil # : x  Auto Neutrophil % : x  Auto Lymphocyte % : x  Auto Monocyte % : x  Auto Eosinophil % : x  Auto Basophil % : x    06-04    133<L>  |  97  |  20  ----------------------------<  100<H>  4.3   |  24  |  0.75  06-03    135  |  98  |  21  ----------------------------<  124<H>  4.3   |  23  |  0.74    Ca    8.8      04 Jun 2018 06:55  Ca    8.7      03 Jun 2018 06:27  Phos  3.5     06-04  Phos  3.1     06-03  Mg     2.0     06-04  Mg     1.9     06-03   	    ASSESSMENT/PLAN: 86 y/o woman with h/o Dementia, Severe AS, DM, HTN, HLD who lives in a NH and presented with Fever and Respiratory Distress, with CTNT elevation    1) Type II NSTEMI - occurred in the setting of Sepsis, now appears to be resolved   - continue Aspirin 81 mg PO Daily, Metoprolol XL 25 mg PO Daily   - would restart statin    2) Severe AS - previously determined not to be a candidate for TAVR	          Zane Whitt  Cardiology Fellow  Consult Fellow carries in-house phone [phone]) from 7:30 am - 5:00 pm M - F  For non-urgent issues outside of the above time period, please feel free to contact me directly by text or call at [phone]

## 2018-06-04 NOTE — PROGRESS NOTE ADULT - PROBLEM SELECTOR PLAN 2
Round to have RLL PNA associated with R pleural effusion concerning for aspiration PNA leading to acute hypoxic respiratory failure requiring BIPAP, improved to nasal cannula. Aspiration possibly related to progessiva dementia vs lethargy due to sepsis. RVP neg  -Continue ceftriaxone  -Titrate oxygen to maintain SpO2 >92%, currently on 2L NC Haemophilus influenzae bacteremia likely from CAP. 1/4 bottles also growing Alpha-hemolytic Strep species likely contaminant  -Repeat BCx no growth  -continue levaquin, s/p zosyn and vanc (5/29 - 5/30), CTX (5/30 - 6/3)   -TTE no sign of vegetation  -Appreciate ID recs

## 2018-06-04 NOTE — PROGRESS NOTE ADULT - PROBLEM SELECTOR PLAN 5
type II NSTEMI in setting of demand ischemia from sepsis. Trop peaked 0.2, now downtrended  -Monitor off tele  -Cardiology recs appreciated  -Continue ASA, Toprol Resolved. Presented with right lobe pneumonia associated and Haemophilus influenzae bacteremia with lactate 4 and hypotension. UA and RVP neg. s/p 2.5L IVF in ED  -continue antibiotics  -f/u abx dose and length of treatment with ID

## 2018-06-04 NOTE — PROGRESS NOTE ADULT - ATTENDING COMMENTS
85 year old woman with severe AS, based on all clinical circumstances medical management most appropriate. Currently well compensated.

## 2018-06-04 NOTE — PROGRESS NOTE ADULT - PROBLEM SELECTOR PLAN 4
Resolved. Presented with right lobe pneumonia associated and Haemophilus influenzae bacteremia with lactate 4 and hypotension. UA and RVP neg. s/p 2.5L IVF in ED  -continue CTX -continue Toprol

## 2018-06-04 NOTE — PROGRESS NOTE ADULT - PROBLEM SELECTOR PLAN 1
Haemophilus influenzae bacteremia likely from CAP. 1/4 bottles also growing Alpha-hemolytic Strep species likely contaminant  -Repeat BCx no growth  -zosyn and vanc (5/29 - 5/30), CTX (5/30 - )   -TTE no sign of vegetation  -Appreciate ID recs 2/2 PNA and R parapneumonic effusion. Presented requiring BIPAP, quickly improved with antibiotics.  -continue antibiotics  -repeat CXR shows increased R pleural effusion, lasix 20IV x 1

## 2018-06-04 NOTE — PROGRESS NOTE ADULT - ASSESSMENT
86yo woman with hx dementia, severe AS, HTN, HLD, DM2 presenting from nursing home with severe sepsis and acute hypoxic respiratory failure due to Haemophilus pneumoniae bacteremia and aspiration pneumonia. 88yo woman with hx dementia, severe AS, HTN, HLD, DM2 presenting from nursing home with severe sepsis and acute hypoxic respiratory failure due to Haemophilus pneumoniae bacteremia and aspiration pneumonia. Now with continuing hypoxia

## 2018-06-04 NOTE — PROGRESS NOTE ADULT - PROBLEM SELECTOR PLAN 3
-continue Toprol Found to have RLL PNA associated with R pleural effusion concerning for aspiration PNA leading to acute hypoxic respiratory failure requiring BIPAP, improved to nasal cannula. Aspiration possibly related to progessiva dementia vs lethargy due to sepsis. RVP neg  -continue antibiotics  -Titrate oxygen to maintain SpO2 >92%, currently on 2L NC

## 2018-06-04 NOTE — PROGRESS NOTE ADULT - SUBJECTIVE AND OBJECTIVE BOX
Internal Medicine Progress Note    Cindy Madrid, PGY1  Internal Medicine, team 1  Pager [phone] / [phone]  After 7PM on weekdays and 12PM on weekends, please page #[phone]    Patient is a 85y old  Female who presents with a chief complaint of Fever (29 May 2018 18:29)      SUBJECTIVE / OVERNIGHT EVENTS:     MEDICATIONS  (STANDING):  aspirin enteric coated 81 milliGRAM(s) Oral daily  busPIRone 15 milliGRAM(s) Oral two times a day  cyanocobalamin 100 MICROGram(s) Oral daily  docusate sodium 100 milliGRAM(s) Oral two times a day  gabapentin 100 milliGRAM(s) Oral three times a day  heparin  Injectable 5000 Unit(s) SubCutaneous every 8 hours  levoFLOXacin  Tablet 250 milliGRAM(s) Oral every 24 hours  melatonin 3 milliGRAM(s) Oral at bedtime  metoprolol succinate ER 25 milliGRAM(s) Oral daily  nystatin Powder 1 Application(s) Topical two times a day  pantoprazole    Tablet 40 milliGRAM(s) Oral before breakfast  polyethylene glycol 3350 17 Gram(s) Oral daily  senna 2 Tablet(s) Oral at bedtime  sertraline 12.5 milliGRAM(s) Oral daily    MEDICATIONS  (PRN):      Vital Signs Last 24 Hrs  T(C): 36.7 (04 Jun 2018 04:40), Max: 37.3 (03 Jun 2018 12:38)  T(F): 98.1 (04 Jun 2018 04:40), Max: 99.1 (03 Jun 2018 12:38)  HR: 64 (04 Jun 2018 04:40) (56 - 64)  BP: 130/78 (04 Jun 2018 04:40) (108/65 - 130/78)  BP(mean): --  RR: 18 (04 Jun 2018 04:40) (16 - 18)  SpO2: 91% (04 Jun 2018 04:40) (89% - 94%)    CAPILLARY BLOOD GLUCOSE          I&O's Summary    03 Jun 2018 07:01  -  04 Jun 2018 07:00  --------------------------------------------------------  IN: 530 mL / OUT: 400 mL / NET: 130 mL      PHYSICAL EXAM  GENERAL: NAD  HEAD:  Atraumatic  EYES: EOMI, PERRLA, conjunctiva and sclera clear  NECK: Supple, No JVD, no cervical LAD  CHEST/LUNG: crackles at R lung base with decreased air movement; No wheeze, respirations nonlabored on nasal cannula  HEART: Regular rate and rhythm; No murmurs, rubs, or gallops  ABDOMEN: Soft, Nontender, Nondistended; Bowel sounds present  EXTREMITIES:  2+ Peripheral Pulses, No clubbing, cyanosis, or edema  NEURO/PSYCH: AAOx2 (self, hospital), nonfocal  SKIN: No rashes or lesions      LABS:  no AM labs    MICROBIOLOGY:    Culture - Blood (collected 31 May 2018 08:30)  Source: .Blood Blood-Venous  Preliminary Report (01 Jun 2018 09:01):    No growth to date.    Culture - Blood (collected 31 May 2018 08:30)  Source: .Blood Blood-Peripheral  Preliminary Report (01 Jun 2018 09:01):    No growth to date.      Culture - Urine (collected 29 May 2018 16:32)  Source: .Urine Catheterized  Final Report (31 May 2018 17:29):    10,000 - 49,000 CFU/mL Escherichia coli  Organism: Escherichia coli (31 May 2018 17:29)  Organism: Escherichia coli (31 May 2018 17:29)    Culture - Blood (collected 29 May 2018 16:22)  Source: .Blood Blood  Gram Stain (30 May 2018 14:16):    Growth in aerobic bottle: Gram Negative Coccobacilli    Growth in anaerobic bottle: Gram Negative Coccobacilli  Final Report (01 Jun 2018 09:03):    Growth in aerobic and anaerobic bottles: Haemophilus influenzae  Organism: Haemophilus influenzae (01 Jun 2018 09:03)  Organism: Haemophilus influenzae (01 Jun 2018 09:03)    Culture - Blood (collected 29 May 2018 16:22)  Source: .Blood Blood  Gram Stain (30 May 2018 16:50):    Growth in aerobic bottle: Gram Negative Coccobacilli    Growth in anaerobic bottle: Gram Negative Coccobacilli    Gram Positive Cocci in Pairs and Chains  Final Report (01 Jun 2018 09:07):    Growth in aerobic and anaerobic bottles: Haemophilus influenzae    See previous culture [id number]    Growth in anaerobic bottle: Alpha hemolytic strep    (not Strep. pneumoniae or Enterococcus)    Single set isolate, possible contaminant. Contact    Microbiology if susceptibility testing clinically    indicated.    "Due to technical problems, Proteus sp. will Not be reported as part of    the BCID panel until further notice"    ***Blood Panel PCR results on this specimen are available    approximately 3 hours after the Gram stain result.***    Gram stain, PCR, and/or culture results may not always    correspond due to difference in methodologies.    ************************************************************    This PCR assay was performed using [employer].    The following targets are tested for: Enterococcus,    vancomycin resistant enterococci, Listeria monocytogenes,    coagulase negative staphylococci, S. aureus,    methicillin resistant S. aureus, Streptococcus agalactiae    (Group B), S. pneumoniae, S. pyogenes (Group A),    Acinetobacter baumannii, Enterobacter cloacae, E. coli,    Klebsiella oxytoca, K. pneumoniae, Proteus sp.,    Serratia marcescens, Haemophilus influenzae,    Neisseria meningitidis, Pseudomonas aeruginosa, Candida    albicans, C. glabrata, C krusei, C parapsilosis,    C. tropicalis and the KPC resistance gene.  Organism: Blood Culture PCR  Blood Culture PCR (01 Jun 2018 09:07)  Organism: Blood Culture PCR (01 Jun 2018 09:07)  Organism: Blood Culture PCR (01 Jun 2018 09:07)      RADIOLOGY & ADDITIONAL TESTS:  < from: Transthoracic Echocardiogram (05.31.18 @ 20:38) >  Conclusions: LVEF 70-75%  1. Mitral annular calcification and calcified mitral  leaflets with normal diastolic opening. Mild mitral  regurgitation.  2. Calcified trileaflet aortic valve with decreased  opening. Peak transaortic valve gradient equals 96 mm Hg,  mean transaortic valve gradient equals 57 mm Hg, estimated  aortic valve area equals 0.8 sqcm (by continuity equation),  aortic valve velocity time integral equals 115 cm,  consistent with severe aortic stenosis. Mild aortic  regurgitation.  3. Moderately dilated left atrium.  LA volume index = 43  cc/m2.  4. Mild concentric left ventricular hypertrophy.  5. Normal left ventricular systolic function. No segmental  wall motion abnormalities.  6. Normal right ventricular size and function.  7. Estimated right ventricular systolic pressure equals 39  mm Hg, assuming right atrial pressure equals 8 mm Hg,  consistent with borderline pulmonary hypertension.  *** No previous Echo exam.       CONSULTS: cards, ID Internal Medicine Progress Note    Cindy Madrid, PGY1  Internal Medicine, team 1  Pager [phone] / [phone]  After 7PM on weekdays and 12PM on weekends, please page #[phone]    Patient is a 85y old  Female who presents with a chief complaint of Fever (29 May 2018 18:29)      SUBJECTIVE / OVERNIGHT EVENTS:     MEDICATIONS  (STANDING):  aspirin enteric coated 81 milliGRAM(s) Oral daily  busPIRone 15 milliGRAM(s) Oral two times a day  cyanocobalamin 100 MICROGram(s) Oral daily  docusate sodium 100 milliGRAM(s) Oral two times a day  gabapentin 100 milliGRAM(s) Oral three times a day  heparin  Injectable 5000 Unit(s) SubCutaneous every 8 hours  levoFLOXacin  Tablet 250 milliGRAM(s) Oral every 24 hours  melatonin 3 milliGRAM(s) Oral at bedtime  metoprolol succinate ER 25 milliGRAM(s) Oral daily  nystatin Powder 1 Application(s) Topical two times a day  pantoprazole    Tablet 40 milliGRAM(s) Oral before breakfast  polyethylene glycol 3350 17 Gram(s) Oral daily  senna 2 Tablet(s) Oral at bedtime  sertraline 12.5 milliGRAM(s) Oral daily    MEDICATIONS  (PRN):      Vital Signs Last 24 Hrs  T(C): 36.7 (04 Jun 2018 04:40), Max: 37.3 (03 Jun 2018 12:38)  T(F): 98.1 (04 Jun 2018 04:40), Max: 99.1 (03 Jun 2018 12:38)  HR: 64 (04 Jun 2018 04:40) (56 - 64)  BP: 130/78 (04 Jun 2018 04:40) (108/65 - 130/78)  BP(mean): --  RR: 18 (04 Jun 2018 04:40) (16 - 18)  SpO2: 91% (04 Jun 2018 04:40) (89% - 94%)    CAPILLARY BLOOD GLUCOSE          I&O's Summary    03 Jun 2018 07:01  -  04 Jun 2018 07:00  --------------------------------------------------------  IN: 530 mL / OUT: 400 mL / NET: 130 mL      PHYSICAL EXAM  GENERAL: NAD  HEAD:  Atraumatic  EYES: EOMI, PERRLA, conjunctiva and sclera clear  NECK: Supple, No JVD, no cervical LAD  CHEST/LUNG: crackles at R lung base with decreased air movement; No wheeze, respirations nonlabored on nasal cannula  HEART: Regular rate and rhythm; No murmurs, rubs, or gallops  ABDOMEN: Soft, Nontender, Nondistended; Bowel sounds present  EXTREMITIES:  2+ Peripheral Pulses, No clubbing, cyanosis, or edema  NEURO/PSYCH: AAOx2 (self, hospital), nonfocal  SKIN: No rashes or lesions      LABS:                        10.6   9.1   )-----------( 263      ( 04 Jun 2018 06:56 )             31.2     CBC Full  -  ( 04 Jun 2018 06:56 )  WBC Count : 9.1 K/uL  Hemoglobin : 10.6 g/dL  Hematocrit : 31.2 %  Platelet Count - Automated : 263 K/uL  Mean Cell Volume : 92.1 fl  Mean Cell Hemoglobin : 31.1 pg  Mean Cell Hemoglobin Concentration : 33.8 gm/dL  Auto Neutrophil # : x  Auto Lymphocyte # : x  Auto Monocyte # : x  Auto Eosinophil # : x  Auto Basophil # : x  Auto Neutrophil % : x  Auto Lymphocyte % : x  Auto Monocyte % : x  Auto Eosinophil % : x  Auto Basophil % : x    06-04    133<L>  |  97  |  20  ----------------------------<  100<H>  4.3   |  24  |  0.75    Ca    8.8      04 Jun 2018 06:55  Phos  3.5     06-04  Mg     2.0     06-04      Creatinine Trend: 0.75<--, 0.74<--, 0.80<--, 0.80<--, 0.99<--, 1.02<--                MICROBIOLOGY:      MICROBIOLOGY:    Culture - Blood (collected 31 May 2018 08:30)  Source: .Blood Blood-Venous  Preliminary Report (01 Jun 2018 09:01):    No growth to date.    Culture - Blood (collected 31 May 2018 08:30)  Source: .Blood Blood-Peripheral  Preliminary Report (01 Jun 2018 09:01):    No growth to date.      Culture - Urine (collected 29 May 2018 16:32)  Source: .Urine Catheterized  Final Report (31 May 2018 17:29):    10,000 - 49,000 CFU/mL Escherichia coli  Organism: Escherichia coli (31 May 2018 17:29)  Organism: Escherichia coli (31 May 2018 17:29)    Culture - Blood (collected 29 May 2018 16:22)  Source: .Blood Blood  Gram Stain (30 May 2018 14:16):    Growth in aerobic bottle: Gram Negative Coccobacilli    Growth in anaerobic bottle: Gram Negative Coccobacilli  Final Report (01 Jun 2018 09:03):    Growth in aerobic and anaerobic bottles: Haemophilus influenzae  Organism: Haemophilus influenzae (01 Jun 2018 09:03)  Organism: Haemophilus influenzae (01 Jun 2018 09:03)    Culture - Blood (collected 29 May 2018 16:22)  Source: .Blood Blood  Gram Stain (30 May 2018 16:50):    Growth in aerobic bottle: Gram Negative Coccobacilli    Growth in anaerobic bottle: Gram Negative Coccobacilli    Gram Positive Cocci in Pairs and Chains  Final Report (01 Jun 2018 09:07):    Growth in aerobic and anaerobic bottles: Haemophilus influenzae    See previous culture [id number]    Growth in anaerobic bottle: Alpha hemolytic strep    (not Strep. pneumoniae or Enterococcus)    Single set isolate, possible contaminant. Contact    Microbiology if susceptibility testing clinically    indicated.    "Due to technical problems, Proteus sp. will Not be reported as part of    the BCID panel until further notice"    ***Blood Panel PCR results on this specimen are available    approximately 3 hours after the Gram stain result.***    Gram stain, PCR, and/or culture results may not always    correspond due to difference in methodologies.    ************************************************************    This PCR assay was performed using [employer].    The following targets are tested for: Enterococcus,    vancomycin resistant enterococci, Listeria monocytogenes,    coagulase negative staphylococci, S. aureus,    methicillin resistant S. aureus, Streptococcus agalactiae    (Group B), S. pneumoniae, S. pyogenes (Group A),    Acinetobacter baumannii, Enterobacter cloacae, E. coli,    Klebsiella oxytoca, K. pneumoniae, Proteus sp.,    Serratia marcescens, Haemophilus influenzae,    Neisseria meningitidis, Pseudomonas aeruginosa, Candida    albicans, C. glabrata, C krusei, C parapsilosis,    C. tropicalis and the KPC resistance gene.  Organism: Blood Culture PCR  Blood Culture PCR (01 Jun 2018 09:07)  Organism: Blood Culture PCR (01 Jun 2018 09:07)  Organism: Blood Culture PCR (01 Jun 2018 09:07)      RADIOLOGY & ADDITIONAL TESTS:  < from: Transthoracic Echocardiogram (05.31.18 @ 20:38) >  Conclusions: LVEF 70-75%  1. Mitral annular calcification and calcified mitral  leaflets with normal diastolic opening. Mild mitral  regurgitation.  2. Calcified trileaflet aortic valve with decreased  opening. Peak transaortic valve gradient equals 96 mm Hg,  mean transaortic valve gradient equals 57 mm Hg, estimated  aortic valve area equals 0.8 sqcm (by continuity equation),  aortic valve velocity time integral equals 115 cm,  consistent with severe aortic stenosis. Mild aortic  regurgitation.  3. Moderately dilated left atrium.  LA volume index = 43  cc/m2.  4. Mild concentric left ventricular hypertrophy.  5. Normal left ventricular systolic function. No segmental  wall motion abnormalities.  6. Normal right ventricular size and function.  7. Estimated right ventricular systolic pressure equals 39  mm Hg, assuming right atrial pressure equals 8 mm Hg,  consistent with borderline pulmonary hypertension.  *** No previous Echo exam.       CONSULTS: cards, ID Internal Medicine Progress Note    Cindy Madrid, PGY1  Internal Medicine, team 1  Pager [phone] / [phone]  After 7PM on weekdays and 12PM on weekends, please page #[phone]    Patient is a 85y old  Female who presents with a chief complaint of Fever (29 May 2018 18:29)      SUBJECTIVE / OVERNIGHT EVENTS: sat 86% on RA, continue 2L NC. Pt has no complaints. Denies CP, SOB, cough, abd pain, dysuria.    MEDICATIONS  (STANDING):  aspirin enteric coated 81 milliGRAM(s) Oral daily  busPIRone 15 milliGRAM(s) Oral two times a day  cyanocobalamin 100 MICROGram(s) Oral daily  docusate sodium 100 milliGRAM(s) Oral two times a day  gabapentin 100 milliGRAM(s) Oral three times a day  heparin  Injectable 5000 Unit(s) SubCutaneous every 8 hours  levoFLOXacin  Tablet 250 milliGRAM(s) Oral every 24 hours  melatonin 3 milliGRAM(s) Oral at bedtime  metoprolol succinate ER 25 milliGRAM(s) Oral daily  nystatin Powder 1 Application(s) Topical two times a day  pantoprazole    Tablet 40 milliGRAM(s) Oral before breakfast  polyethylene glycol 3350 17 Gram(s) Oral daily  senna 2 Tablet(s) Oral at bedtime  sertraline 12.5 milliGRAM(s) Oral daily    MEDICATIONS  (PRN):      Vital Signs Last 24 Hrs  T(C): 36.6 (04 Jun 2018 12:39), Max: 36.7 (03 Jun 2018 21:20)  T(F): 97.9 (04 Jun 2018 12:39), Max: 98.1 (03 Jun 2018 21:20)  HR: 63 (04 Jun 2018 12:39) (56 - 64)  BP: 121/72 (04 Jun 2018 12:39) (108/65 - 130/78)  BP(mean): --  RR: 18 (04 Jun 2018 12:39) (18 - 18)  SpO2: 93% (04 Jun 2018 12:39) (86% - 93%)    CAPILLARY BLOOD GLUCOSE          I&O's Summary    03 Jun 2018 07:01  -  04 Jun 2018 07:00  --------------------------------------------------------  IN: 530 mL / OUT: 400 mL / NET: 130 mL      PHYSICAL EXAM  GENERAL: NAD  HEAD:  Atraumatic  EYES: EOMI, PERRLA, conjunctiva and sclera clear  NECK: Supple, No JVD, no cervical LAD  CHEST/LUNG: crackles at R lung base with decreased air movement; No wheeze, respirations nonlabored on nasal cannula  HEART: Regular rate and rhythm; No murmurs, rubs, or gallops  ABDOMEN: Soft, Nontender, Nondistended; Bowel sounds present  EXTREMITIES:  2+ Peripheral Pulses, No clubbing, cyanosis, or edema  NEURO/PSYCH: AAOx2 (self, hospital), nonfocal  SKIN: No rashes or lesions      LABS:                        10.6   9.1   )-----------( 263      ( 04 Jun 2018 06:56 )             31.2     CBC Full  -  ( 04 Jun 2018 06:56 )  WBC Count : 9.1 K/uL  Hemoglobin : 10.6 g/dL  Hematocrit : 31.2 %  Platelet Count - Automated : 263 K/uL  Mean Cell Volume : 92.1 fl  Mean Cell Hemoglobin : 31.1 pg  Mean Cell Hemoglobin Concentration : 33.8 gm/dL  Auto Neutrophil # : x  Auto Lymphocyte # : x  Auto Monocyte # : x  Auto Eosinophil # : x  Auto Basophil # : x  Auto Neutrophil % : x  Auto Lymphocyte % : x  Auto Monocyte % : x  Auto Eosinophil % : x  Auto Basophil % : x    06-04    133<L>  |  97  |  20  ----------------------------<  100<H>  4.3   |  24  |  0.75    Ca    8.8      04 Jun 2018 06:55  Phos  3.5     06-04  Mg     2.0     06-04      Creatinine Trend: 0.75<--, 0.74<--, 0.80<--, 0.80<--, 0.99<--, 1.02<--                MICROBIOLOGY:      MICROBIOLOGY:    Culture - Blood (collected 31 May 2018 08:30)  Source: .Blood Blood-Venous  Preliminary Report (01 Jun 2018 09:01):    No growth to date.    Culture - Blood (collected 31 May 2018 08:30)  Source: .Blood Blood-Peripheral  Preliminary Report (01 Jun 2018 09:01):    No growth to date.      Culture - Urine (collected 29 May 2018 16:32)  Source: .Urine Catheterized  Final Report (31 May 2018 17:29):    10,000 - 49,000 CFU/mL Escherichia coli  Organism: Escherichia coli (31 May 2018 17:29)  Organism: Escherichia coli (31 May 2018 17:29)    Culture - Blood (collected 29 May 2018 16:22)  Source: .Blood Blood  Gram Stain (30 May 2018 14:16):    Growth in aerobic bottle: Gram Negative Coccobacilli    Growth in anaerobic bottle: Gram Negative Coccobacilli  Final Report (01 Jun 2018 09:03):    Growth in aerobic and anaerobic bottles: Haemophilus influenzae  Organism: Haemophilus influenzae (01 Jun 2018 09:03)  Organism: Haemophilus influenzae (01 Jun 2018 09:03)    Culture - Blood (collected 29 May 2018 16:22)  Source: .Blood Blood  Gram Stain (30 May 2018 16:50):    Growth in aerobic bottle: Gram Negative Coccobacilli    Growth in anaerobic bottle: Gram Negative Coccobacilli    Gram Positive Cocci in Pairs and Chains  Final Report (01 Jun 2018 09:07):    Growth in aerobic and anaerobic bottles: Haemophilus influenzae    See previous culture [id number]    Growth in anaerobic bottle: Alpha hemolytic strep    (not Strep. pneumoniae or Enterococcus)    Single set isolate, possible contaminant. Contact    Microbiology if susceptibility testing clinically    indicated.    "Due to technical problems, Proteus sp. will Not be reported as part of    the BCID panel until further notice"    ***Blood Panel PCR results on this specimen are available    approximately 3 hours after the Gram stain result.***    Gram stain, PCR, and/or culture results may not always    correspond due to difference in methodologies.    ************************************************************    This PCR assay was performed using [employer].    The following targets are tested for: Enterococcus,    vancomycin resistant enterococci, Listeria monocytogenes,    coagulase negative staphylococci, S. aureus,    methicillin resistant S. aureus, Streptococcus agalactiae    (Group B), S. pneumoniae, S. pyogenes (Group A),    Acinetobacter baumannii, Enterobacter cloacae, E. coli,    Klebsiella oxytoca, K. pneumoniae, Proteus sp.,    Serratia marcescens, Haemophilus influenzae,    Neisseria meningitidis, Pseudomonas aeruginosa, Candida    albicans, C. glabrata, C krusei, C parapsilosis,    C. tropicalis and the KPC resistance gene.  Organism: Blood Culture PCR  Blood Culture PCR (01 Jun 2018 09:07)  Organism: Blood Culture PCR (01 Jun 2018 09:07)  Organism: Blood Culture PCR (01 Jun 2018 09:07)      RADIOLOGY & ADDITIONAL TESTS:  < from: Transthoracic Echocardiogram (05.31.18 @ 20:38) >  Conclusions: LVEF 70-75%  1. Mitral annular calcification and calcified mitral  leaflets with normal diastolic opening. Mild mitral  regurgitation.  2. Calcified trileaflet aortic valve with decreased  opening. Peak transaortic valve gradient equals 96 mm Hg,  mean transaortic valve gradient equals 57 mm Hg, estimated  aortic valve area equals 0.8 sqcm (by continuity equation),  aortic valve velocity time integral equals 115 cm,  consistent with severe aortic stenosis. Mild aortic  regurgitation.  3. Moderately dilated left atrium.  LA volume index = 43  cc/m2.  4. Mild concentric left ventricular hypertrophy.  5. Normal left ventricular systolic function. No segmental  wall motion abnormalities.  6. Normal right ventricular size and function.  7. Estimated right ventricular systolic pressure equals 39  mm Hg, assuming right atrial pressure equals 8 mm Hg,  consistent with borderline pulmonary hypertension.  *** No previous Echo exam.       CONSULTS: cards, ID

## 2018-06-04 NOTE — PROGRESS NOTE ADULT - PROBLEM SELECTOR PLAN 7
-ASA 81 daily Not on home diabetes medication. A1c 6.1, serum glucose stable  Hyperlipidemia: c/w ASA

## 2018-06-04 NOTE — PROGRESS NOTE ADULT - PROBLEM SELECTOR PLAN 6
Not on home diabetes medication. A1c 6.1  -FS have been stable, d/c finger sticks type II NSTEMI in setting of demand ischemia from sepsis. Trop peaked 0.2, now downtrended  -Monitor off tele  -Cardiology recs appreciated  -Continue ASA, Toprol

## 2018-06-05 LAB
ANION GAP SERPL CALC-SCNC: 14 MMOL/L — SIGNIFICANT CHANGE UP (ref 5–17)
BUN SERPL-MCNC: 20 MG/DL — SIGNIFICANT CHANGE UP (ref 7–23)
CALCIUM SERPL-MCNC: 8.7 MG/DL — SIGNIFICANT CHANGE UP (ref 8.4–10.5)
CHLORIDE SERPL-SCNC: 94 MMOL/L — LOW (ref 96–108)
CO2 SERPL-SCNC: 25 MMOL/L — SIGNIFICANT CHANGE UP (ref 22–31)
CREAT SERPL-MCNC: 0.92 MG/DL — SIGNIFICANT CHANGE UP (ref 0.5–1.3)
CULTURE RESULTS: SIGNIFICANT CHANGE UP
CULTURE RESULTS: SIGNIFICANT CHANGE UP
GLUCOSE SERPL-MCNC: 115 MG/DL — HIGH (ref 70–99)
MAGNESIUM SERPL-MCNC: 1.9 MG/DL — SIGNIFICANT CHANGE UP (ref 1.6–2.6)
PHOSPHATE SERPL-MCNC: 4.4 MG/DL — SIGNIFICANT CHANGE UP (ref 2.5–4.5)
POTASSIUM SERPL-MCNC: 4.2 MMOL/L — SIGNIFICANT CHANGE UP (ref 3.5–5.3)
POTASSIUM SERPL-SCNC: 4.2 MMOL/L — SIGNIFICANT CHANGE UP (ref 3.5–5.3)
SODIUM SERPL-SCNC: 133 MMOL/L — LOW (ref 135–145)
SPECIMEN SOURCE: SIGNIFICANT CHANGE UP
SPECIMEN SOURCE: SIGNIFICANT CHANGE UP

## 2018-06-05 PROCEDURE — 99233 SBSQ HOSP IP/OBS HIGH 50: CPT

## 2018-06-05 PROCEDURE — 99233 SBSQ HOSP IP/OBS HIGH 50: CPT | Mod: GC

## 2018-06-05 RX ORDER — FUROSEMIDE 40 MG
40 TABLET ORAL ONCE
Qty: 0 | Refills: 0 | Status: COMPLETED | OUTPATIENT
Start: 2018-06-05 | End: 2018-06-05

## 2018-06-05 RX ADMIN — Medication 15 MILLIGRAM(S): at 17:06

## 2018-06-05 RX ADMIN — POLYETHYLENE GLYCOL 3350 17 GRAM(S): 17 POWDER, FOR SOLUTION ORAL at 11:53

## 2018-06-05 RX ADMIN — GABAPENTIN 100 MILLIGRAM(S): 400 CAPSULE ORAL at 13:42

## 2018-06-05 RX ADMIN — Medication 25 MILLIGRAM(S): at 06:34

## 2018-06-05 RX ADMIN — SERTRALINE 12.5 MILLIGRAM(S): 25 TABLET, FILM COATED ORAL at 11:57

## 2018-06-05 RX ADMIN — Medication 100 MILLIGRAM(S): at 06:29

## 2018-06-05 RX ADMIN — HEPARIN SODIUM 5000 UNIT(S): 5000 INJECTION INTRAVENOUS; SUBCUTANEOUS at 13:42

## 2018-06-05 RX ADMIN — HEPARIN SODIUM 5000 UNIT(S): 5000 INJECTION INTRAVENOUS; SUBCUTANEOUS at 06:36

## 2018-06-05 RX ADMIN — NYSTATIN CREAM 1 APPLICATION(S): 100000 CREAM TOPICAL at 06:38

## 2018-06-05 RX ADMIN — Medication 81 MILLIGRAM(S): at 11:53

## 2018-06-05 RX ADMIN — Medication 15 MILLIGRAM(S): at 06:29

## 2018-06-05 RX ADMIN — PREGABALIN 100 MICROGRAM(S): 225 CAPSULE ORAL at 13:41

## 2018-06-05 RX ADMIN — Medication 40 MILLIGRAM(S): at 11:52

## 2018-06-05 RX ADMIN — PANTOPRAZOLE SODIUM 40 MILLIGRAM(S): 20 TABLET, DELAYED RELEASE ORAL at 06:30

## 2018-06-05 RX ADMIN — Medication 3 MILLIGRAM(S): at 21:16

## 2018-06-05 RX ADMIN — SENNA PLUS 2 TABLET(S): 8.6 TABLET ORAL at 21:16

## 2018-06-05 RX ADMIN — NYSTATIN CREAM 1 APPLICATION(S): 100000 CREAM TOPICAL at 17:06

## 2018-06-05 RX ADMIN — Medication 100 MILLIGRAM(S): at 17:06

## 2018-06-05 RX ADMIN — GABAPENTIN 100 MILLIGRAM(S): 400 CAPSULE ORAL at 21:16

## 2018-06-05 RX ADMIN — HEPARIN SODIUM 5000 UNIT(S): 5000 INJECTION INTRAVENOUS; SUBCUTANEOUS at 21:16

## 2018-06-05 RX ADMIN — GABAPENTIN 100 MILLIGRAM(S): 400 CAPSULE ORAL at 06:30

## 2018-06-05 NOTE — PROGRESS NOTE ADULT - ASSESSMENT
88yo woman with hx dementia, severe AS, HTN, HLD, DM2 presenting from nursing home with severe sepsis and acute hypoxic respiratory failure due to Haemophilus pneumoniae bacteremia and aspiration pneumonia. Now with continuing hypoxia 86yo woman with hx dementia, severe AS, HTN, HLD, DM2 presenting from nursing home with severe sepsis and acute hypoxic respiratory failure due to Haemophilus pneumoniae bacteremia and aspiration pneumonia with b/l parapneumonic effusions with continuing hypoxia undergoing diuresis.

## 2018-06-05 NOTE — PROGRESS NOTE ADULT - SUBJECTIVE AND OBJECTIVE BOX
Internal Medicine Progress Note    Cindy Madrid, PGY1  Internal Medicine, team 1  Pager [phone] / [phone]  After 7PM on weekdays and 12PM on weekends, please page #[phone]    Patient is a 85y old  Female who presents with a chief complaint of Fever (29 May 2018 18:29)      SUBJECTIVE / OVERNIGHT EVENTS:     MEDICATIONS  (STANDING):  aspirin enteric coated 81 milliGRAM(s) Oral daily  busPIRone 15 milliGRAM(s) Oral two times a day  cyanocobalamin 100 MICROGram(s) Oral daily  docusate sodium 100 milliGRAM(s) Oral two times a day  gabapentin 100 milliGRAM(s) Oral three times a day  heparin  Injectable 5000 Unit(s) SubCutaneous every 8 hours  levoFLOXacin  Tablet 250 milliGRAM(s) Oral every 24 hours  melatonin 3 milliGRAM(s) Oral at bedtime  metoprolol succinate ER 25 milliGRAM(s) Oral daily  nystatin Powder 1 Application(s) Topical two times a day  pantoprazole    Tablet 40 milliGRAM(s) Oral before breakfast  polyethylene glycol 3350 17 Gram(s) Oral daily  senna 2 Tablet(s) Oral at bedtime  sertraline 12.5 milliGRAM(s) Oral daily    MEDICATIONS  (PRN):      Vital Signs Last 24 Hrs  T(C): 36.8 (05 Jun 2018 04:23), Max: 36.8 (05 Jun 2018 04:23)  T(F): 98.2 (05 Jun 2018 04:23), Max: 98.2 (05 Jun 2018 04:23)  HR: 70 (05 Jun 2018 04:23) (63 - 70)  BP: 111/49 (05 Jun 2018 04:23) (111/49 - 145/78)  BP(mean): --  RR: 18 (05 Jun 2018 04:23) (18 - 18)  SpO2: 96% (05 Jun 2018 04:23) (86% - 96%)    CAPILLARY BLOOD GLUCOSE          I&O's Summary    04 Jun 2018 07:01  -  05 Jun 2018 07:00  --------------------------------------------------------  IN: 500 mL / OUT: 200 mL / NET: 300 mL      PHYSICAL EXAM  GENERAL: NAD  HEAD:  Atraumatic  EYES: EOMI, PERRLA, conjunctiva and sclera clear  NECK: Supple, No JVD, no cervical LAD  CHEST/LUNG: crackles at R lung base with decreased air movement; No wheeze, respirations nonlabored on nasal cannula  HEART: Regular rate and rhythm; No murmurs, rubs, or gallops  ABDOMEN: Soft, Nontender, Nondistended; Bowel sounds present  EXTREMITIES:  2+ Peripheral Pulses, No clubbing, cyanosis, or edema  NEURO/PSYCH: AAOx2 (self, hospital), nonfocal  SKIN: No rashes or lesions      LABS:                        10.6   9.1   )-----------( 263      ( 04 Jun 2018 06:56 )             31.2     CBC Full  -  ( 04 Jun 2018 06:56 )  WBC Count : 9.1 K/uL  Hemoglobin : 10.6 g/dL  Hematocrit : 31.2 %  Platelet Count - Automated : 263 K/uL  Mean Cell Volume : 92.1 fl  Mean Cell Hemoglobin : 31.1 pg  Mean Cell Hemoglobin Concentration : 33.8 gm/dL  Auto Neutrophil # : x  Auto Lymphocyte # : x  Auto Monocyte # : x  Auto Eosinophil # : x  Auto Basophil # : x  Auto Neutrophil % : x  Auto Lymphocyte % : x  Auto Monocyte % : x  Auto Eosinophil % : x  Auto Basophil % : x    06-05    133<L>  |  94<L>  |  20  ----------------------------<  115<H>  4.2   |  25  |  0.92    Ca    8.7      05 Jun 2018 06:41  Phos  4.4     06-05  Mg     1.9     06-05      Creatinine Trend: 0.92<--, 0.75<--, 0.74<--, 0.80<--, 0.80<--, 0.99<--      MICROBIOLOGY:       RADIOLOGY & ADDITIONAL TESTS:  < from: Transthoracic Echocardiogram (05.31.18 @ 20:38) >  Conclusions: LVEF 70-75%  1. Mitral annular calcification and calcified mitral  leaflets with normal diastolic opening. Mild mitral  regurgitation.  2. Calcified trileaflet aortic valve with decreased  opening. Peak transaortic valve gradient equals 96 mm Hg,  mean transaortic valve gradient equals 57 mm Hg, estimated  aortic valve area equals 0.8 sqcm (by continuity equation),  aortic valve velocity time integral equals 115 cm,  consistent with severe aortic stenosis. Mild aortic  regurgitation.  3. Moderately dilated left atrium.  LA volume index = 43  cc/m2.  4. Mild concentric left ventricular hypertrophy.  5. Normal left ventricular systolic function. No segmental  wall motion abnormalities.  6. Normal right ventricular size and function.  7. Estimated right ventricular systolic pressure equals 39  mm Hg, assuming right atrial pressure equals 8 mm Hg,  consistent with borderline pulmonary hypertension.  *** No previous Echo exam.       CONSULTS: cards, ID Internal Medicine Progress Note    Cindy Madrid, PGY1  Internal Medicine, team 1  Pager [phone] / [phone]  After 7PM on weekdays and 12PM on weekends, please page #[phone]    Patient is a 85y old  Female who presents with a chief complaint of Fever (29 May 2018 18:29)      SUBJECTIVE / OVERNIGHT EVENTS: sat 89% on RA. Pt has no complaints. Denies SOB, CP.    MEDICATIONS  (STANDING):  aspirin enteric coated 81 milliGRAM(s) Oral daily  busPIRone 15 milliGRAM(s) Oral two times a day  cyanocobalamin 100 MICROGram(s) Oral daily  docusate sodium 100 milliGRAM(s) Oral two times a day  gabapentin 100 milliGRAM(s) Oral three times a day  heparin  Injectable 5000 Unit(s) SubCutaneous every 8 hours  levoFLOXacin  Tablet 250 milliGRAM(s) Oral every 24 hours  melatonin 3 milliGRAM(s) Oral at bedtime  metoprolol succinate ER 25 milliGRAM(s) Oral daily  nystatin Powder 1 Application(s) Topical two times a day  pantoprazole    Tablet 40 milliGRAM(s) Oral before breakfast  polyethylene glycol 3350 17 Gram(s) Oral daily  senna 2 Tablet(s) Oral at bedtime  sertraline 12.5 milliGRAM(s) Oral daily    MEDICATIONS  (PRN):      Vital Signs Last 24 Hrs  T(C): 36.8 (05 Jun 2018 12:49), Max: 36.8 (05 Jun 2018 04:23)  T(F): 98.2 (05 Jun 2018 12:49), Max: 98.2 (05 Jun 2018 04:23)  HR: 54 (05 Jun 2018 12:49) (54 - 70)  BP: 104/55 (05 Jun 2018 12:49) (104/55 - 145/78)  BP(mean): --  RR: 18 (05 Jun 2018 12:49) (18 - 18)  SpO2: 89% (05 Jun 2018 12:49) (89% - 96%)    CAPILLARY BLOOD GLUCOSE          I&O's Summary    04 Jun 2018 07:01  -  05 Jun 2018 07:00  --------------------------------------------------------  IN: 500 mL / OUT: 200 mL / NET: 300 mL      PHYSICAL EXAM  GENERAL: NAD  HEAD:  Atraumatic  EYES: EOMI, PERRLA, conjunctiva and sclera clear  NECK: Supple, No JVD, no cervical LAD  CHEST/LUNG: crackles at R lung base; No wheeze, respirations nonlabored on nasal cannula  HEART: Regular rate and rhythm; No murmurs, rubs, or gallops  ABDOMEN: Soft, Nontender, Nondistended; Bowel sounds present  EXTREMITIES:  2+ Peripheral Pulses, No clubbing, cyanosis, or edema  NEURO/PSYCH: AAOx2 (self, hospital), nonfocal  SKIN: No rashes or lesions      LABS:     05 Jun 2018 06:41    133    |  94     |  20     ----------------------------<  115    4.2     |  25     |  0.92     Ca    8.7        05 Jun 2018 06:41  Phos  4.4       05 Jun 2018 06:41  Mg     1.9       05 Jun 2018 06:41        RADIOLOGY & ADDITIONAL TESTS:  < from: Transthoracic Echocardiogram (05.31.18 @ 20:38) >  Conclusions: LVEF 70-75%  1. Mitral annular calcification and calcified mitral  leaflets with normal diastolic opening. Mild mitral  regurgitation.  2. Calcified trileaflet aortic valve with decreased  opening. Peak transaortic valve gradient equals 96 mm Hg,  mean transaortic valve gradient equals 57 mm Hg, estimated  aortic valve area equals 0.8 sqcm (by continuity equation),  aortic valve velocity time integral equals 115 cm,  consistent with severe aortic stenosis. Mild aortic  regurgitation.  3. Moderately dilated left atrium.  LA volume index = 43  cc/m2.  4. Mild concentric left ventricular hypertrophy.  5. Normal left ventricular systolic function. No segmental  wall motion abnormalities.  6. Normal right ventricular size and function.  7. Estimated right ventricular systolic pressure equals 39  mm Hg, assuming right atrial pressure equals 8 mm Hg,  consistent with borderline pulmonary hypertension.  *** No previous Echo exam.       CONSULTS: cards, ID

## 2018-06-05 NOTE — PROGRESS NOTE ADULT - SUBJECTIVE AND OBJECTIVE BOX
Progress Note:   · Provider Specialty	Cardiology	      · Subjective and Objective: 	  24H hour events: No acute events overnight. No complaints.     MEDICATIONS:  aspirin enteric coated 81 milliGRAM(s) Oral daily  heparin  Injectable 5000 Unit(s) SubCutaneous every 8 hours  metoprolol succinate ER 25 milliGRAM(s) Oral daily    levoFLOXacin  Tablet 250 milliGRAM(s) Oral every 24 hours      busPIRone 15 milliGRAM(s) Oral two times a day  gabapentin 100 milliGRAM(s) Oral three times a day  melatonin 3 milliGRAM(s) Oral at bedtime  sertraline 12.5 milliGRAM(s) Oral daily    docusate sodium 100 milliGRAM(s) Oral two times a day  pantoprazole    Tablet 40 milliGRAM(s) Oral before breakfast  polyethylene glycol 3350 17 Gram(s) Oral daily  senna 2 Tablet(s) Oral at bedtime  cyanocobalamin 100 MICROGram(s) Oral daily  nystatin Powder 1 Application(s) Topical two times a day      REVIEW OF SYSTEMS: Unable to obtain due to Dementia.     PHYSICAL EXAM:  T(C): 36.7 (06-04-18 @ 04:40), Max: 37.3 (06-03-18 @ 12:38)  HR: 64 (06-04-18 @ 04:40) (56 - 64)  BP: 130/78 (06-04-18 @ 04:40) (108/65 - 130/78)  RR: 18 (06-04-18 @ 10:04) (16 - 18)  SpO2: 86% (06-04-18 @ 10:04) (86% - 94%)  Wt(kg): --  I&O's Summary    03 Jun 2018 07:01  -  04 Jun 2018 07:00  --------------------------------------------------------  IN: 530 mL / OUT: 400 mL / NET: 130 mL        Appearance: Normal; Dementia	  HEENT:   Normal oral mucosa  Cardiovascular: RRR, gr iii/vi harsh late peaking crescendo-decrescendo systolic murmur base to neck, No JVD, No edema  Respiratory: Lungs clear to auscultation	  Psychiatry: Mood & affect appropriate  Gastrointestinal:  Soft  Skin: No rashes, No ecchymoses, No cyanosis	  Neurologic: Non-focal  Extremities: No clubbing, cyanosis or edema  Vascular: Peripheral pulses palpable         LABS:	 Reviewed 06 - 05 -18	    CBC Full  -  ( 04 Jun 2018 06:56 )  WBC Count : 9.1 K/uL  Hemoglobin : 10.6 g/dL  Hematocrit : 31.2 %  Platelet Count - Automated : 263 K/uL  Mean Cell Volume : 92.1 fl  Mean Cell Hemoglobin : 31.1 pg  Mean Cell Hemoglobin Concentration : 33.8 gm/dL  Auto Neutrophil # : x  Auto Lymphocyte # : x  Auto Monocyte # : x  Auto Eosinophil # : x  Auto Basophil # : x  Auto Neutrophil % : x  Auto Lymphocyte % : x  Auto Monocyte % : x  Auto Eosinophil % : x  Auto Basophil % : x    06-04    133<L>  |  97  |  20  ----------------------------<  100<H>  4.3   |  24  |  0.75  06-03    135  |  98  |  21  ----------------------------<  124<H>  4.3   |  23  |  0.74    Ca    8.8      04 Jun 2018 06:55  Ca    8.7      03 Jun 2018 06:27  Phos  3.5     06-04  Phos  3.1     06-03  Mg     2.0     06-04  Mg     1.9     06-03   	    ASSESSMENT/PLAN: 86 y/o woman with h/o Dementia, Severe AS, DM, HTN, HLD who lives in a NH and presented with Fever and Respiratory Distress, with CTNT elevation    1) Type II NSTEMI - occurred in the setting of Sepsis, now appears to be resolved   - continue Aspirin 81 mg PO Daily, Metoprolol XL 25 mg PO Daily   - would restart statin    2) Severe AS - previously determined not to be a candidate for TAVR

## 2018-06-05 NOTE — PROGRESS NOTE ADULT - PROBLEM SELECTOR PLAN 2
Haemophilus influenzae bacteremia likely from CAP. 1/4 bottles also growing Alpha-hemolytic Strep species likely contaminant  -Repeat BCx no growth  -continue levaquin, s/p zosyn and vanc (5/29 - 5/30), CTX (5/30 - 6/3)   -TTE no sign of vegetation  -Appreciate ID recs Haemophilus influenzae bacteremia likely from CAP. 1/4 bottles also growing Alpha-hemolytic Strep species likely contaminant  -Repeat BCx no growth  -continue levaquin , s/p zosyn and vanc (5/29 - 5/30), CTX (5/30 - 6/3). Will treat for 10-d course, EOT 6/07  -TTE no sign of vegetation  -Appreciate ID recs

## 2018-06-05 NOTE — PROGRESS NOTE ADULT - PROBLEM SELECTOR PLAN 3
Found to have RLL PNA associated with R pleural effusion concerning for aspiration PNA leading to acute hypoxic respiratory failure requiring BIPAP, improved to nasal cannula. Aspiration possibly related to progessiva dementia vs lethargy due to sepsis. RVP neg  -continue antibiotics  -Titrate oxygen to maintain SpO2 >92%, currently on 2L NC

## 2018-06-05 NOTE — PROGRESS NOTE ADULT - PROBLEM SELECTOR PLAN 5
Resolved. Presented with right lobe pneumonia associated and Haemophilus influenzae bacteremia with lactate 4 and hypotension. UA and RVP neg. s/p 2.5L IVF in ED  -continue antibiotics  -f/u abx dose and length of treatment with ID

## 2018-06-05 NOTE — PROGRESS NOTE ADULT - PROBLEM SELECTOR PLAN 1
2/2 PNA and R parapneumonic effusion. Presented requiring BIPAP, quickly improved with antibiotics.  -continue antibiotics  -repeat CXR shows increased R pleural effusion, lasix 20IV x 1 2/2 PNA and R parapneumonic effusion. Presented requiring BIPAP, quickly improved with antibiotics.  -continue antibiotics  -repeat CXR shows increased R pleural effusion with hypoxia 89% on RA  -spot dose lasix

## 2018-06-06 ENCOUNTER — TRANSCRIPTION ENCOUNTER (OUTPATIENT)
Age: 83
End: 2018-06-06

## 2018-06-06 VITALS — WEIGHT: 169.76 LBS

## 2018-06-06 LAB
ANION GAP SERPL CALC-SCNC: 13 MMOL/L — SIGNIFICANT CHANGE UP (ref 5–17)
BUN SERPL-MCNC: 22 MG/DL — SIGNIFICANT CHANGE UP (ref 7–23)
CALCIUM SERPL-MCNC: 8.8 MG/DL — SIGNIFICANT CHANGE UP (ref 8.4–10.5)
CHLORIDE SERPL-SCNC: 95 MMOL/L — LOW (ref 96–108)
CO2 SERPL-SCNC: 25 MMOL/L — SIGNIFICANT CHANGE UP (ref 22–31)
CREAT SERPL-MCNC: 0.89 MG/DL — SIGNIFICANT CHANGE UP (ref 0.5–1.3)
GLUCOSE SERPL-MCNC: 116 MG/DL — HIGH (ref 70–99)
HCT VFR BLD CALC: 34.5 % — SIGNIFICANT CHANGE UP (ref 34.5–45)
HGB BLD-MCNC: 11.1 G/DL — LOW (ref 11.5–15.5)
MAGNESIUM SERPL-MCNC: 1.9 MG/DL — SIGNIFICANT CHANGE UP (ref 1.6–2.6)
MCHC RBC-ENTMCNC: 29.5 PG — SIGNIFICANT CHANGE UP (ref 27–34)
MCHC RBC-ENTMCNC: 32.3 GM/DL — SIGNIFICANT CHANGE UP (ref 32–36)
MCV RBC AUTO: 91.5 FL — SIGNIFICANT CHANGE UP (ref 80–100)
NT-PROBNP SERPL-SCNC: 4698 PG/ML — HIGH (ref 0–300)
PHOSPHATE SERPL-MCNC: 4.1 MG/DL — SIGNIFICANT CHANGE UP (ref 2.5–4.5)
PLATELET # BLD AUTO: 368 K/UL — SIGNIFICANT CHANGE UP (ref 150–400)
POTASSIUM SERPL-MCNC: 4.3 MMOL/L — SIGNIFICANT CHANGE UP (ref 3.5–5.3)
POTASSIUM SERPL-SCNC: 4.3 MMOL/L — SIGNIFICANT CHANGE UP (ref 3.5–5.3)
RBC # BLD: 3.78 M/UL — LOW (ref 3.8–5.2)
RBC # FLD: 12.5 % — SIGNIFICANT CHANGE UP (ref 10.3–14.5)
SODIUM SERPL-SCNC: 133 MMOL/L — LOW (ref 135–145)
WBC # BLD: 8.9 K/UL — SIGNIFICANT CHANGE UP (ref 3.8–10.5)
WBC # FLD AUTO: 8.9 K/UL — SIGNIFICANT CHANGE UP (ref 3.8–10.5)

## 2018-06-06 PROCEDURE — 82947 ASSAY GLUCOSE BLOOD QUANT: CPT

## 2018-06-06 PROCEDURE — 83735 ASSAY OF MAGNESIUM: CPT

## 2018-06-06 PROCEDURE — 84295 ASSAY OF SERUM SODIUM: CPT

## 2018-06-06 PROCEDURE — 84100 ASSAY OF PHOSPHORUS: CPT

## 2018-06-06 PROCEDURE — 97530 THERAPEUTIC ACTIVITIES: CPT

## 2018-06-06 PROCEDURE — 99291 CRITICAL CARE FIRST HOUR: CPT | Mod: 25

## 2018-06-06 PROCEDURE — 51701 INSERT BLADDER CATHETER: CPT

## 2018-06-06 PROCEDURE — 82550 ASSAY OF CK (CPK): CPT

## 2018-06-06 PROCEDURE — 83605 ASSAY OF LACTIC ACID: CPT

## 2018-06-06 PROCEDURE — 97161 PT EVAL LOW COMPLEX 20 MIN: CPT

## 2018-06-06 PROCEDURE — 87798 DETECT AGENT NOS DNA AMP: CPT

## 2018-06-06 PROCEDURE — 85610 PROTHROMBIN TIME: CPT

## 2018-06-06 PROCEDURE — 99233 SBSQ HOSP IP/OBS HIGH 50: CPT

## 2018-06-06 PROCEDURE — 82553 CREATINE MB FRACTION: CPT

## 2018-06-06 PROCEDURE — 99239 HOSP IP/OBS DSCHRG MGMT >30: CPT

## 2018-06-06 PROCEDURE — 85014 HEMATOCRIT: CPT

## 2018-06-06 PROCEDURE — 71045 X-RAY EXAM CHEST 1 VIEW: CPT

## 2018-06-06 PROCEDURE — 87186 SC STD MICRODIL/AGAR DIL: CPT

## 2018-06-06 PROCEDURE — 81001 URINALYSIS AUTO W/SCOPE: CPT

## 2018-06-06 PROCEDURE — 85027 COMPLETE CBC AUTOMATED: CPT

## 2018-06-06 PROCEDURE — 82330 ASSAY OF CALCIUM: CPT

## 2018-06-06 PROCEDURE — 83036 HEMOGLOBIN GLYCOSYLATED A1C: CPT

## 2018-06-06 PROCEDURE — 93306 TTE W/DOPPLER COMPLETE: CPT

## 2018-06-06 PROCEDURE — 80048 BASIC METABOLIC PNL TOTAL CA: CPT

## 2018-06-06 PROCEDURE — 82435 ASSAY OF BLOOD CHLORIDE: CPT

## 2018-06-06 PROCEDURE — 94660 CPAP INITIATION&MGMT: CPT

## 2018-06-06 PROCEDURE — 87486 CHLMYD PNEUM DNA AMP PROBE: CPT

## 2018-06-06 PROCEDURE — 80053 COMPREHEN METABOLIC PANEL: CPT

## 2018-06-06 PROCEDURE — 84484 ASSAY OF TROPONIN QUANT: CPT

## 2018-06-06 PROCEDURE — 82803 BLOOD GASES ANY COMBINATION: CPT

## 2018-06-06 PROCEDURE — 87581 M.PNEUMON DNA AMP PROBE: CPT

## 2018-06-06 PROCEDURE — 87086 URINE CULTURE/COLONY COUNT: CPT

## 2018-06-06 PROCEDURE — 82962 GLUCOSE BLOOD TEST: CPT

## 2018-06-06 PROCEDURE — 87633 RESP VIRUS 12-25 TARGETS: CPT

## 2018-06-06 PROCEDURE — 97116 GAIT TRAINING THERAPY: CPT

## 2018-06-06 PROCEDURE — 83880 ASSAY OF NATRIURETIC PEPTIDE: CPT

## 2018-06-06 PROCEDURE — 85730 THROMBOPLASTIN TIME PARTIAL: CPT

## 2018-06-06 PROCEDURE — 82784 ASSAY IGA/IGD/IGG/IGM EACH: CPT

## 2018-06-06 PROCEDURE — 84132 ASSAY OF SERUM POTASSIUM: CPT

## 2018-06-06 PROCEDURE — 87040 BLOOD CULTURE FOR BACTERIA: CPT

## 2018-06-06 PROCEDURE — 87150 DNA/RNA AMPLIFIED PROBE: CPT

## 2018-06-06 PROCEDURE — 87184 SC STD DISK METHOD PER PLATE: CPT

## 2018-06-06 RX ORDER — PANTOPRAZOLE SODIUM 20 MG/1
1 TABLET, DELAYED RELEASE ORAL
Qty: 0 | Refills: 0 | COMMUNITY
Start: 2018-06-06

## 2018-06-06 RX ADMIN — Medication 81 MILLIGRAM(S): at 11:11

## 2018-06-06 RX ADMIN — PANTOPRAZOLE SODIUM 40 MILLIGRAM(S): 20 TABLET, DELAYED RELEASE ORAL at 05:20

## 2018-06-06 RX ADMIN — PREGABALIN 100 MICROGRAM(S): 225 CAPSULE ORAL at 11:11

## 2018-06-06 RX ADMIN — POLYETHYLENE GLYCOL 3350 17 GRAM(S): 17 POWDER, FOR SOLUTION ORAL at 11:11

## 2018-06-06 RX ADMIN — Medication 25 MILLIGRAM(S): at 05:20

## 2018-06-06 RX ADMIN — Medication 15 MILLIGRAM(S): at 05:48

## 2018-06-06 RX ADMIN — HEPARIN SODIUM 5000 UNIT(S): 5000 INJECTION INTRAVENOUS; SUBCUTANEOUS at 05:20

## 2018-06-06 RX ADMIN — GABAPENTIN 100 MILLIGRAM(S): 400 CAPSULE ORAL at 05:20

## 2018-06-06 RX ADMIN — SERTRALINE 12.5 MILLIGRAM(S): 25 TABLET, FILM COATED ORAL at 11:11

## 2018-06-06 RX ADMIN — Medication 100 MILLIGRAM(S): at 05:20

## 2018-06-06 RX ADMIN — NYSTATIN CREAM 1 APPLICATION(S): 100000 CREAM TOPICAL at 05:26

## 2018-06-06 NOTE — DISCHARGE NOTE ADULT - MEDICATION SUMMARY - MEDICATIONS TO TAKE
I will START or STAY ON the medications listed below when I get home from the hospital:    aspirin 81 mg oral delayed release tablet  -- 1 tab(s) by mouth once a day  -- Indication: For Coronary artery disease    Tylenol Regular Strength  -- 650 milligram(s) by mouth every 6 hours, As Needed  -- Indication: For Pain    gabapentin 100 mg oral capsule  -- 1 cap(s) by mouth 3 times a day  -- Indication: For Pain    QUEtiapine  -- 12.5 milligram(s) by mouth once a day at 1pm  -- Indication: For Dementia    busPIRone 15 mg oral tablet  -- 1 tab(s) by mouth 2 times a day at 9 am and 3 pm   -- Indication: For Dementia    Toprol-XL 25 mg oral tablet, extended release  -- 1 tab(s) by mouth once a day  -- Indication: For Aortic stenosis    DuoNeb 0.5 mg-2.5 mg/3 mL inhalation solution  -- 3 milliliter(s) inhaled 4 times a day  -- Indication: For Asthma    Cranberry oral capsule  -- 425 milligram(s) by mouth once a day (in the morning)  -- Indication: For Supplement    Senna 8.6 mg oral tablet  -- 2 tab(s) by mouth once a day (at bedtime)  -- Indication: For Constipation    melatonin 3 mg oral tablet  -- 1 tab(s) by mouth once a day (at bedtime)  -- Indication: For Insomnia    PriLOSEC 20 mg oral delayed release capsule  -- 1 cap(s) by mouth once a day  -- Indication: For Acid reflux    levoFLOXacin 250 mg oral tablet  -- 1 tab(s) by mouth every 24 hours. Take 1 tab on 6/07/2918. EOT 6/07/2018  -- Indication: For Haemophilus influenzae bacteremia    Vitamin B-12 100 mcg oral tablet  -- 1 tab(s) by mouth once a day  -- Indication: For Supplement

## 2018-06-06 NOTE — DIETITIAN INITIAL EVALUATION ADULT. - PROBLEM SELECTOR PLAN 4
type II nstemi in setting of demand ischemia from sepsis  trend cardiac enzymes  cardiology recs appreciated  cont asa holding b blocker, not on statin

## 2018-06-06 NOTE — DISCHARGE NOTE ADULT - ADDITIONAL INSTRUCTIONS
-See your primary care doctor within 2 weeks of discharge for further management of your bacteremia, pneumonia, aortic stenosis, and other chronic conditions. Your antibiotic treatment ends Thursday, 6/07/2018. Take your last tab of antibiotic tomorrow.

## 2018-06-06 NOTE — DIETITIAN INITIAL EVALUATION ADULT. - PROBLEM SELECTOR PLAN 8
-check TTE (last in 2012 with hyperdynamic LV func, mild AS)  -outpatient follow up  -s/p 2.5L IVF, hold additional for now

## 2018-06-06 NOTE — PROGRESS NOTE ADULT - ASSESSMENT
88yo woman with hx dementia, severe AS, HTN, HLD, DM2 presenting from nursing home with severe sepsis and acute hypoxic respiratory failure due to Haemophilus pneumoniae bacteremia and aspiration pneumonia with b/l parapneumonic effusions with continuing hypoxia undergoing diuresis.

## 2018-06-06 NOTE — DISCHARGE NOTE ADULT - PLAN OF CARE
Treat You were found to have a blood infection. Your repeat blood cultures were negative. Your heart echo did not show sign of infection. Take your last dose of antibiotic on Thursday, 6/07/2018 to complete your treatment. You presented with pneumonia which led to trouble breathing and low oxygen. You required BIPAP to help you breathe. Your breathing status improved with antibiotics and you were breathing well on room air. You had a heart echo showing you have severe aortic stenosis. Continue to take your Toprol medication. Continue to take your medications as directed.

## 2018-06-06 NOTE — DIETITIAN INITIAL EVALUATION ADULT. - OTHER INFO
dementia. seen for length of stay, admitted for chief complaint of fever, consuming >75% of meals-observed breakfast at time of visit, denies nausea/vomit, denies difficulty chewing /swallow. last BM yesterday?. NKFA. refused need for diet ed or supplements.

## 2018-06-06 NOTE — DISCHARGE NOTE ADULT - PATIENT PORTAL LINK FT
You can access the [facility] Patient Portal, offered by , by registering with the following website: [email or web address]

## 2018-06-06 NOTE — DIETITIAN INITIAL EVALUATION ADULT. - PROBLEM SELECTOR PLAN 1
Round to have RLL PNA associated with R pleural effusion concerning for aspiration PNA leading to acute hypoxic respiratory failure requiring BIPAP. Aspiration possibly related to progessiva dementia vs lethargic due to sepsis  -Treat with zosyn, vanc  -Continue BIPAP to maintain SpO2 >92%  -Monitor vital signs q4h  -Will hold IVF overnight due to severe AS and pleural effusion and reassess in morning s/p 2.5L in ED  -f/u vanc trough  -f/u BCx and ucx from nursing home (likely more accurate as drawn before abx)  -f/u RVP, lactate in AM

## 2018-06-06 NOTE — PROGRESS NOTE ADULT - PROBLEM SELECTOR PLAN 2
Haemophilus influenzae bacteremia likely from CAP. 1/4 bottles also growing Alpha-hemolytic Strep species likely contaminant  -Repeat BCx no growth  -continue levaquin , s/p zosyn and vanc (5/29 - 5/30), CTX (5/30 - 6/3). Will treat for 10-d course, EOT 6/07  -TTE no sign of vegetation  -Appreciate ID recs

## 2018-06-06 NOTE — PROGRESS NOTE ADULT - SUBJECTIVE AND OBJECTIVE BOX
Referring: Dr. Morrison    Cardiology Progress Note:   · Provider Specialty	Cardiology	      · Subjective and Objective: 	  24H hour events: No acute events overnight. No complaints.     MEDICATIONS:  aspirin enteric coated 81 milliGRAM(s) Oral daily  heparin  Injectable 5000 Unit(s) SubCutaneous every 8 hours  metoprolol succinate ER 25 milliGRAM(s) Oral daily    levoFLOXacin  Tablet 250 milliGRAM(s) Oral every 24 hours      busPIRone 15 milliGRAM(s) Oral two times a day  gabapentin 100 milliGRAM(s) Oral three times a day  melatonin 3 milliGRAM(s) Oral at bedtime  sertraline 12.5 milliGRAM(s) Oral daily    docusate sodium 100 milliGRAM(s) Oral two times a day  pantoprazole    Tablet 40 milliGRAM(s) Oral before breakfast  polyethylene glycol 3350 17 Gram(s) Oral daily  senna 2 Tablet(s) Oral at bedtime  cyanocobalamin 100 MICROGram(s) Oral daily  nystatin Powder 1 Application(s) Topical two times a day      REVIEW OF SYSTEMS: Unable to obtain due to Dementia.     PHYSICAL EXAM:  Vital Signs Last 24 Hrs  T(C): 37.1 (06 Jun 2018 04:33), Max: 37.1 (06 Jun 2018 04:33)  T(F): 98.8 (06 Jun 2018 04:33), Max: 98.8 (06 Jun 2018 04:33)  HR: 67 (06 Jun 2018 04:33) (67 - 67)  BP: 118/68 (06 Jun 2018 04:33) (118/68 - 118/68)  BP(mean): --  RR: 18 (06 Jun 2018 04:33) (18 - 18)  SpO2: 92% (06 Jun 2018 04:33) (92% - 92%)    Appearance: Normal; Dementia	  HEENT:   Normal oral mucosa  Cardiovascular: RRR, gr iii/vi harsh late peaking crescendo-decrescendo systolic murmur base to neck, No JVD, No edema  Respiratory: Lungs clear to auscultation	  Psychiatry: Mood & affect appropriate  Gastrointestinal:  Soft  Skin: No rashes, No ecchymoses, No cyanosis	  Neurologic: Non-focal  Extremities: No clubbing, cyanosis or edema  Vascular: Peripheral pulses palpable         LABS:	 Reviewed 06 - 06 -18	    CBC Full  -  ( 04 Jun 2018 06:56 )  WBC Count : 9.1 K/uL  Hemoglobin : 10.6 g/dL  Hematocrit : 31.2 %  Platelet Count - Automated : 263 K/uL  Mean Cell Volume : 92.1 fl  Mean Cell Hemoglobin : 31.1 pg  Mean Cell Hemoglobin Concentration : 33.8 gm/dL  Auto Neutrophil # : x  Auto Lymphocyte # : x  Auto Monocyte # : x  Auto Eosinophil # : x  Auto Basophil # : x  Auto Neutrophil % : x  Auto Lymphocyte % : x  Auto Monocyte % : x  Auto Eosinophil % : x  Auto Basophil % : x    06-04    133<L>  |  97  |  20  ----------------------------<  100<H>  4.3   |  24  |  0.75  06-03    135  |  98  |  21  ----------------------------<  124<H>  4.3   |  23  |  0.74    Ca    8.8      04 Jun 2018 06:55  Ca    8.7      03 Jun 2018 06:27  Phos  3.5     06-04  Phos  3.1     06-03  Mg     2.0     06-04  Mg     1.9     06-03   	    ASSESSMENT/PLAN: 86 y/o woman with h/o Dementia, Severe AS, DM, HTN, HLD who lives in a NH and presented with Fever and Respiratory Distress, with CTNT elevation    1) Type II NSTEMI - occurred in the setting of Sepsis, now appears to be resolved   - continue Aspirin 81 mg PO Daily, Metoprolol XL 25 mg PO Daily   - would restart statin    2) Severe AS - previously determined not to be a candidate for TAVR	    ok to d/c

## 2018-06-06 NOTE — DISCHARGE NOTE ADULT - HOSPITAL COURSE
HPI: 86yo woman with hx dementia, severe AS, HTN, HLD, DM2 presenting from nursing home with T103. At nursing home shortly prior to departing, received CTX 1gm, vancomycin 1gm and zosyn 3.375mg. Pt developed dyspnea and was taken to the ED after son rescinded her Do Not Hospitalize order. EMS placed her on CPAP. She is still DNR/DNI. Pt does not know why she is in the hospital. Says she lives with her son at home. She endorses cough and sore throat. Denies pain, abd pain, CP, N/V/D, rash, headache, dysuria. Per ED provider note, pt was speaking in 2 word sentences and dyspneic. Her son was in the ED, but was not present at time of interview. Per nursing home documentation, pt eats a regular diet and does not require help eating at baseline. Pt had a recent admission to [facility] 1/25 - 1/31/2018 for behavioral disturbance deemed likely due to progression of dementia c/b UTI that admission treated with Macrobid 5-day course. Psychiatry saw her that admission and initiated Seroquel and she was discharged to the nursing home.    Hospital course: ED vitals: T104 rectal, , BP 78/49 improved to 103/57, RR 30, sat 70% on RA, improved to 98% on BIPAP. She received tylenol PO, 500cc LR as she had just received abx at the nursing home. ROS negative on interview in ED- pt poor historian but will follow simple commands and will awaken to voice command/touch. She had a type II NSTEMI with trop that peaked at 0.2. Cardiology followed the case. Pt was found to have a RLL pneumonia concerning for aspiration pneumonia. Blood cultures were positive for Haemophilus influenzae. Pt's vanc and zosyn were transitioned to CTX then Levaquin for antibiotic course 5/29 - 6/07/2018. Pt is to take her last dose of Levaquin 6/07/2018. TTE did not show any vegetations, with known aortic stenosis, not a TARV candidate. Oxygen was titrated down from BIPAP initially to nasal cannula. At rest, her sat was 94% on RA and 90% when she ambulated with PT. Mental status improved to her baseline of alert and oriented x 2 (self, hospital) with poor memory, ambulatory on RA. Pt was medically optimized for discharge back to her nursing home. HPI: 88yo woman with hx dementia, severe AS, HTN, HLD, DM2 presenting from nursing home with T103. At nursing home shortly prior to departing, received CTX 1gm, vancomycin 1gm and zosyn 3.375mg. Pt developed dyspnea and was taken to the ED after son rescinded her Do Not Hospitalize order. EMS placed her on CPAP. She is still DNR/DNI. Pt does not know why she is in the hospital. Says she lives with her son at home. She endorses cough and sore throat. Denies pain, abd pain, CP, N/V/D, rash, headache, dysuria. Per ED provider note, pt was speaking in 2 word sentences and dyspneic. Her son was in the ED, but was not present at time of interview. Per nursing home documentation, pt eats a regular diet and does not require help eating at baseline. Pt had a recent admission to [facility] 1/25 - 1/31/2018 for behavioral disturbance deemed likely due to progression of dementia c/b UTI that admission treated with Macrobid 5-day course. Psychiatry saw her that admission and initiated Seroquel and she was discharged to the nursing home.    Hospital course: ED vitals: T104 rectal, , BP 78/49 improved to 103/57, RR 30, sat 70% on RA, improved to 98% on BIPAP. She received tylenol PO, 500cc LR as she had just received abx at the nursing home. ROS negative on interview in ED- pt poor historian but will follow simple commands and will awaken to voice command/touch. She had a type II NSTEMI with trop that peaked at 0.2. Cardiology followed the case. Pt was found to have a RLL pneumonia concerning for aspiration pneumonia. Blood cultures were positive for Haemophilus influenzae. Pt's vanc and zosyn were transitioned to CTX then Levaquin for antibiotic course 5/29 - 6/07/2018. Pt is to take her last dose of Levaquin 6/07/2018. TTE did not show any vegetations, with known aortic stenosis, not a TARV candidate. Oxygen was titrated down from BIPAP initially to nasal cannula. She remained hypoxic on RA in the 80s%. pBNP 4698, was given lasix 40mg PO x 2. At rest, her sat was 94% on RA and 90% when she ambulated with PT. Mental status improved to her baseline of alert and oriented x 2 (self, hospital) with poor memory, ambulatory on RA. Pt was medically optimized for discharge back to her nursing home. HPI: 88yo woman with hx dementia, severe AS, HTN, HLD, DM2 presenting from nursing home with T103. At nursing home shortly prior to departing, received CTX 1gm, vancomycin 1gm and zosyn 3.375mg. Pt developed dyspnea and was taken to the ED after son rescinded her Do Not Hospitalize order. EMS placed her on CPAP. She is still DNR/DNI. Pt does not know why she is in the hospital. Says she lives with her son at home. She endorses cough and sore throat. Denies pain, abd pain, CP, N/V/D, rash, headache, dysuria. Per ED provider note, pt was speaking in 2 word sentences and dyspneic. Her son was in the ED, but was not present at time of interview. Per nursing home documentation, pt eats a regular diet and does not require help eating at baseline. Pt had a recent admission to [facility] 1/25 - 1/31/2018 for behavioral disturbance deemed likely due to progression of dementia c/b UTI that admission treated with Macrobid 5-day course. Psychiatry saw her that admission and initiated Seroquel and she was discharged to the nursing home.    Hospital course: ED vitals: T104 rectal, , BP 78/49 improved to 103/57, RR 30, sat 70% on RA, improved to 98% on BIPAP. She received tylenol PO, 500cc LR as she had just received abx at the nursing home. ROS negative on interview in ED- pt poor historian but will follow simple commands and will awaken to voice command/touch. She had a type II NSTEMI with trop that peaked at 0.2. Cardiology followed the case. Pt was found to have a RLL pneumonia concerning for aspiration pneumonia. Blood cultures were positive for Haemophilus influenzae. Repeat cultures no growth. Pt's vanc and zosyn were transitioned to CTX then Levaquin for antibiotic course 5/29 - 6/07/2018. Pt is to take her last dose of Levaquin 6/07/2018. TTE did not show any vegetations, with known aortic stenosis, not a TARV candidate. Oxygen was titrated down from BIPAP initially to nasal cannula. She remained hypoxic on RA in the 80s%. Repeat CXR showed b/l pleural effusions small - mod in size R > L. pBNP 4698, was given lasix 40mg PO x 2. At rest, her sat was 94% on RA and 90% when she ambulated with PT. Mental status improved to her baseline of alert and oriented x 2 (self, hospital) with poor memory, ambulatory on RA. Pt was medically optimized for discharge back to her nursing home.

## 2018-06-06 NOTE — PROGRESS NOTE ADULT - PROBLEM SELECTOR PLAN 1
2/2 PNA and R parapneumonic effusion. Presented requiring BIPAP, quickly improved with antibiotics.  -continue antibiotics  -repeat CXR shows increased R pleural effusion with hypoxia 89% on RA  -spot dose lasix

## 2018-06-06 NOTE — PROGRESS NOTE ADULT - SUBJECTIVE AND OBJECTIVE BOX
Internal Medicine Progress Note    Cindy Madrid, PGY1  Internal Medicine, team 1  Pager [phone] / [phone]  After 7PM on weekdays and 12PM on weekends, please page #[phone]    Patient is a 85y old  Female who presents with a chief complaint of Fever (29 May 2018 18:29)      SUBJECTIVE / OVERNIGHT EVENTS: Ambulated with PT yesterday with sat 90%, sat 94%.    MEDICATIONS  (STANDING):  aspirin enteric coated 81 milliGRAM(s) Oral daily  busPIRone 15 milliGRAM(s) Oral two times a day  cyanocobalamin 100 MICROGram(s) Oral daily  docusate sodium 100 milliGRAM(s) Oral two times a day  gabapentin 100 milliGRAM(s) Oral three times a day  heparin  Injectable 5000 Unit(s) SubCutaneous every 8 hours  levoFLOXacin  Tablet 250 milliGRAM(s) Oral every 24 hours  melatonin 3 milliGRAM(s) Oral at bedtime  metoprolol succinate ER 25 milliGRAM(s) Oral daily  nystatin Powder 1 Application(s) Topical two times a day  pantoprazole    Tablet 40 milliGRAM(s) Oral before breakfast  polyethylene glycol 3350 17 Gram(s) Oral daily  senna 2 Tablet(s) Oral at bedtime  sertraline 12.5 milliGRAM(s) Oral daily    MEDICATIONS  (PRN):      Vital Signs Last 24 Hrs  T(C): 37.1 (06 Jun 2018 04:33), Max: 37.1 (05 Jun 2018 20:48)  T(F): 98.8 (06 Jun 2018 04:33), Max: 98.8 (05 Jun 2018 20:48)  HR: 67 (06 Jun 2018 04:33) (54 - 67)  BP: 118/68 (06 Jun 2018 04:33) (104/55 - 123/70)  BP(mean): --  RR: 18 (06 Jun 2018 04:33) (18 - 18)  SpO2: 92% (06 Jun 2018 04:33) (89% - 94%)    CAPILLARY BLOOD GLUCOSE          I&O's Summary    05 Jun 2018 07:01  -  06 Jun 2018 07:00  --------------------------------------------------------  IN: 270 mL / OUT: 0 mL / NET: 270 mL      PHYSICAL EXAM  GENERAL: NAD  HEAD:  Atraumatic  EYES: EOMI, PERRLA, conjunctiva and sclera clear  NECK: Supple, No JVD, no cervical LAD  CHEST/LUNG: crackles at R lung base; No wheeze, respirations nonlabored on nasal cannula  HEART: Regular rate and rhythm; No murmurs, rubs, or gallops  ABDOMEN: Soft, Nontender, Nondistended; Bowel sounds present  EXTREMITIES:  2+ Peripheral Pulses, No clubbing, cyanosis, or edema  NEURO/PSYCH: AAOx2 (self, hospital), nonfocal  SKIN: No rashes or lesions      LABS:                        11.1   8.9   )-----------( 368      ( 06 Jun 2018 06:41 )             34.5     CBC Full  -  ( 06 Jun 2018 06:41 )  WBC Count : 8.9 K/uL  Hemoglobin : 11.1 g/dL  Hematocrit : 34.5 %  Platelet Count - Automated : 368 K/uL  Mean Cell Volume : 91.5 fl  Mean Cell Hemoglobin : 29.5 pg  Mean Cell Hemoglobin Concentration : 32.3 gm/dL  Auto Neutrophil # : x  Auto Lymphocyte # : x  Auto Monocyte # : x  Auto Eosinophil # : x  Auto Basophil # : x  Auto Neutrophil % : x  Auto Lymphocyte % : x  Auto Monocyte % : x  Auto Eosinophil % : x  Auto Basophil % : x    06-06    133<L>  |  95<L>  |  22  ----------------------------<  116<H>  4.3   |  25  |  0.89    Ca    8.8      06 Jun 2018 06:41  Phos  4.1     06-06  Mg     1.9     06-06    Creatinine Trend: 0.89<--, 0.92<--, 0.75<--, 0.74<--, 0.80<--, 0.80<--      MICROBIOLOGY:    RADIOLOGY & ADDITIONAL TESTS:  < from: Transthoracic Echocardiogram (05.31.18 @ 20:38) >  Conclusions: LVEF 70-75%  1. Mitral annular calcification and calcified mitral  leaflets with normal diastolic opening. Mild mitral  regurgitation.  2. Calcified trileaflet aortic valve with decreased  opening. Peak transaortic valve gradient equals 96 mm Hg,  mean transaortic valve gradient equals 57 mm Hg, estimated  aortic valve area equals 0.8 sqcm (by continuity equation),  aortic valve velocity time integral equals 115 cm,  consistent with severe aortic stenosis. Mild aortic  regurgitation.  3. Moderately dilated left atrium.  LA volume index = 43  cc/m2.  4. Mild concentric left ventricular hypertrophy.  5. Normal left ventricular systolic function. No segmental  wall motion abnormalities.  6. Normal right ventricular size and function.  7. Estimated right ventricular systolic pressure equals 39  mm Hg, assuming right atrial pressure equals 8 mm Hg,  consistent with borderline pulmonary hypertension.  *** No previous Echo exam.       CONSULTS: cards, ID

## 2018-06-06 NOTE — DISCHARGE NOTE ADULT - CARE PLAN
Principal Discharge DX:	Bacteremia  Goal:	Treat  Assessment and plan of treatment:	You were found to have a blood infection. Your repeat blood cultures were negative. Your heart echo did not show sign of infection. Take your last dose of antibiotic on Thursday, 6/07/2018 to complete your treatment.  Secondary Diagnosis:	Pneumonia  Assessment and plan of treatment:	You presented with pneumonia which led to trouble breathing and low oxygen. You required BIPAP to help you breathe. Your breathing status improved with antibiotics and you were breathing well on room air.  Secondary Diagnosis:	Aortic stenosis  Assessment and plan of treatment:	You had a heart echo showing you have severe aortic stenosis. Continue to take your Toprol medication.  Secondary Diagnosis:	Dementia  Assessment and plan of treatment:	Continue to take your medications as directed.  Secondary Diagnosis:	HTN (hypertension)  Assessment and plan of treatment:	Continue to take your medications as directed.

## 2018-06-06 NOTE — DIETITIAN INITIAL EVALUATION ADULT. - PROBLEM SELECTOR PLAN 7
Lives at nursing home [facility] 1/2018. Currently lethargic, oriented x 2. QTc 471 (5/29)  -Will obtain baseline mental status from nursing home tmrw  -While lethargic and BIPAP, will hold quetiapine and buspar

## 2018-06-06 NOTE — DIETITIAN INITIAL EVALUATION ADULT. - PROBLEM SELECTOR PLAN 3
Presented with right lobe pneumonia associated with lactate 4 and hypotension. UA neg  -f/u BCx, UCx, RVP  -broad spectrum abx  -s/p 2.5L IVF in ED

## 2018-11-06 NOTE — DISCHARGE NOTE ADULT - ABILITY TO HEAR (WITH HEARING AID OR HEARING APPLIANCE IF NORMALLY USED):
Mildly to Moderately Impaired: difficulty hearing in some environments or speaker may need to increase volume or speak distinctly no

## 2018-11-28 NOTE — ED PROVIDER NOTE - CADM POA CENTRAL LINE
Problem: Occupational Therapy Goals (Adult)  Goal: Occupational Therapy Goals  Pt will practice using >2 coping strategies to manage stress and reduce symptoms to demonstrate increased readiness for discharge.     INITIAL OT NOTE    Subjective  Overall was polite, content and cooperative.     Objective  Attended 1 hour(s) of occupational therapy this date.     Pt actively participated in occupational therapy clinic. Able to independently gather materials, initiate and sequence a self selecting coloring task. Demonstrated good focus and appropriate interaction with peers and writer.     Assessment  Pt would benefit from continued engagement in OT groups that support healthy recovery, specifically exploration of positive coping skills for symptom management/relapse prevention.     Plan  Provide personally meaningful graded occupation-based activities and ongoing assessment.            No

## 2019-10-02 NOTE — PROGRESS NOTE ADULT - PROBLEM SELECTOR PLAN 10
Detail Level: Detailed Add 35222 Cpt? (Important Note: In 2017 The Use Of 45227 Is Being Tracked By Cms To Determine Future Global Period Reimbursement For Global Periods): yes Body Location Override (Optional - Billing Will Still Be Based On Selected Body Map Location If Applicable): superior mid forehead DVT ppx: HSQ  Diet: regular diet  Dispo; likely back to NH; PT consult

## 2020-03-19 NOTE — ED ADULT NURSE NOTE - ED STAT RN HAND OFF
Nutrition Care Plan    Nutrition Diagnosis:   Inadequate intake related to myalgias, cough congestion, possible covid-19 as evidenced by patient with fair intakes since admit, typically eating 1-2 meals per day.    Increased nutrient needs  related to ESRD on dialysis as evidenced by estimated nutritional needs.    Intervention:  General/healthful diet:   Continue Renal Diet   · Encouraged intakes  · Discourage patient from skipping meals   · Promote intakes of high protein foods     Commercial beverage:     · Provide one renal supplement daily with dinner. Will monitor need to increase    Monitoring and Evaluation:  Amount of food:   Goal for patient to consume 75% or greater of meals and supplements      Handoff

## 2021-06-15 NOTE — PROGRESS NOTE BEHAVIORAL HEALTH - NS ED [facility] MED ROS RESPIRATORY
Sydni is the   for pt,  She is requesting the ICD 10 diagnosis codes   Signed and dated by PCP    Sydni Fax Number    No complaints

## 2021-07-03 NOTE — ED ADULT TRIAGE NOTE - PAIN RATING/NUMBER SCALE (0-10): REST
Anesthesia Pre Eval Note    47 year old female who  has a past medical history of Diabetes mellitus (CMS/HCC). presenting for Procedure(s):  CYSTOSCOPY, WITH RETROGRADE PYELOGRAM AND URETERAL STENT INSERTION due to right kidney stone     Anesthesia ROS/Med Hx    Overall Review:  EKG was reviewed     Anesthetic Complication History:  Patient does not have a history of anesthetic complications      Pulmonary Review:  Patient does not have a pulmonary history      Neuro/Psych Review:  Patient does not have a neuro/psych history       Cardiovascular Review:  Patient does not have a cardiovascular history   Exercise tolerance: good (>4 METS)    GI/HEPATIC/RENAL Review:    Positive for renal disease    End/Other Review:  Positive for diabetes  Additional Results:  EKG:  Encounter Date: 07/02/21  -Electrocardiogram 12-Lead       Result                      Value                           Ventricular Rate EKG/M*     137                             Atrial Rate (BPM)           137                             WV-Interval (MSEC)          122                             QRS-Interval (MSEC)         74                              QT-Interval (MSEC)          292                             QTc                         441                             P Axis (Degrees)            61                              R Axis (Degrees)            45                              T Axis (Degrees)            42                              REPORT TEXT                                             Sinus tachycardia   Possible   Left atrial enlargement   Borderline ECG   No previous ECGs available       ALLERGIES:  No Known Allergies       Lab Results       Component                Value               Date                       WBC                      7.4                 07/02/2021                 RBC                      3.57 (L)            07/02/2021                 HGB                      10.3 (L)            07/02/2021                 HCT                       31.4 (L)            07/02/2021                 MCHC                     32.8                07/02/2021                 SODIUM                   128 (L)             07/02/2021                 POTASSIUM                3.8                 07/02/2021                 CHLORIDE                 88 (L)              07/02/2021                 CO2                      18 (L)              07/02/2021                 GLUCOSE                  414 (H)             07/02/2021                 BUN                      30 (H)              07/02/2021                 CREATININE               2.50 (H)            07/02/2021                 GFRESTIMATE              22 (L)              07/02/2021                 CALCIUM                  8.9                 07/02/2021                 PLT                      199                 07/02/2021                 INR                      1.6                 07/02/2021             Past Medical History:  No date: Diabetes mellitus (CMS/[facility])    Past Surgical History:  No date: No past surgeries       Prior to Admission medications :  Medication dapagliflozin (FARXIGA) 10 MG tablet, Sig Take 10 mg by mouth nightly., Start Date , End Date , Taking? Yes, Authorizing Provider Outside Provider    Medication linaGLIPtin (Tradjenta) 5 MG tablet, Sig Take 5 mg by mouth daily., Start Date , End Date , Taking? Yes, Authorizing Provider Outside Provider    Medication VITAMIN D, ERGOCALCIFEROL, PO, Sig Take 1.25 mg by mouth 1 day a week., Start Date , End Date , Taking? Yes, Authorizing Provider Outside Provider    Medication ferrous sulfate 325 (65 FE) MG EC tablet, Sig Take 325 mg by mouth 2 times daily., Start Date , End Date , Taking? Yes, Authorizing Provider Outside Provider    Medication metformin (GLUCOPHAGE) 1000 MG tablet, Sig Take 1,000 mg by mouth daily., Start Date , End Date , Taking? Yes, Authorizing Provider Outside Provider    Medication aspirin (ECOTRIN) 81 MG EC tablet, Sig Take 81  mg by mouth daily., Start Date , End Date , Taking? , Authorizing Provider Outside Provider            Relevant Problems   No relevant active problems       Physical Exam     Airway   Mallampati: II  TM Distance: >3 FB  Neck ROM: Full  Neck: Able to place in sniff position and Short  TMJ Mobility: Good    Cardiovascular  Cardiovascular exam normal  Cardio Rhythm: Regular  Cardio Rate: Normal    Head Assessment  Head assessment: Normocephalic and Atraumatic    General Assessment  General Assessment: No acute distress and Alert and oriented    Dental Exam  Dental exam normal    Pulmonary Exam  Pulmonary exam normal  Breath sounds clear to auscultation:  Yes    Abdominal Exam  Abdominal exam normal      Anesthesia Plan:  Anesthesia Plan    ASA Status: 4  Emergent    Anesthesia Type: General    Induction: Intravenous  Preferred Airway Type: ETT  Maintenance: Inhalational  Premedication: IV    Checklist  Reviewed: Lab Results, EKG, Chest X-Rays, Pregnancy Test Results, Patient Summary, Beta Blocker Status, DNR Status, Allergies, Medications, Problem list, Past Med History, Nursing Notes and NPO Status  Consent/Risks Discussed Statement:  The proposed anesthetic plan, including its risks and benefits, have been discussed with the Patient along with the risks and benefits of alternatives. Questions were encouraged and answered and the patient and/or representative understands and agrees to proceed.        I discussed with the patient (and/or patient's legal representative) the risks and benefits of the proposed anesthesia plan, General, which may include services performed by other anesthesia providers.    Alternative anesthesia plans, if available, were reviewed with the patient (and/or patient's legal representative). Discussion has been held with the patient (and/or patient's legal representative) regarding risks of anesthesia, which include Nausea, Vomiting, Headache, Sore Throat, Dental Injury, Hypotension, Allergic  Reaction, Need for Blood Transfusion, ICU Admit, Intra-operative Awareness and Emergence Delirium and emergent situations that may require change in anesthesia plan.    The patient (and/or patient's legal representative) has indicated understanding, his/her questions have been answered, and he/she wishes to proceed with the planned anesthetic.    Informed Consent for Blood: Consented  Blood Products: Not Anticipated     0

## 2022-02-08 NOTE — BEHAVIORAL HEALTH ASSESSMENT NOTE - MUSCLE TONE / STRENGTH
Normal muscle tone/strength Hydroxyzine Counseling: Patient advised that the medication is sedating and not to drive a car after taking this medication.  Patient informed of potential adverse effects including but not limited to dry mouth, urinary retention, and blurry vision.  The patient verbalized understanding of the proper use and possible adverse effects of hydroxyzine.  All of the patient's questions and concerns were addressed.

## 2022-07-25 NOTE — H&P ADULT - NSHPPHYSICALEXAM_GEN_ALL_CORE
Patient education provided.    GENERAL: No acute distress, well-developed  HEAD:  Atraumatic, Normocephalic  ENT: EOMI, PERRLA, conjunctiva and sclera clear, Neck supple, No JVD, mucous membranes dry  CHEST/LUNG: Crackles at bases; No wheeze, equal breath sounds bilaterally   HEART: Regular rate and rhythm; systolic murmur loudest in RUSB. No rubs, or gallops  ABDOMEN: Soft, Nontender, Nondistended; Bowel sounds present  EXTREMITIES:  No clubbing, cyanosis, or edema  PSYCH: Nl behavior, nl affect  NEUROLOGY: AAOx2 (self, hospital), non-focal  SKIN: Normal color, No rashes or lesions Vital Signs last 24 Hrs  T(C): 38.5 (05-29-18 @ 19:45)  T(F): 101.3 (05-29-18 @ 19:45), Max: 104 (05-29-18 @ 13:48)  HR: 94 (05-29-18 @ 19:45) (94 - 127)  BP: 102/57 (05-29-18 @ 19:45) (78/49 - 118/66)  RR:  (22 - 30)  SpO2:  (70% - 100%)  Wt(kg): --    BIPAP breathing 24 - 34 rate, FiO2 50%, IPAP/EPEP 12/5    GENERAL: No acute distress, well-developed  HEAD:  Atraumatic, Normocephalic  ENT: EOMI, PERRLA, conjunctiva and sclera clear, Neck supple, No JVD, mucous membranes dry  CHEST/LUNG: Crackles at bases; No wheeze, equal breath sounds bilaterally   HEART: Regular rate and rhythm; systolic murmur loudest in RUSB. No rubs, or gallops  ABDOMEN: Soft, Nontender, Nondistended; Bowel sounds present  EXTREMITIES:  No clubbing, cyanosis, or edema  PSYCH: Nl behavior, nl affect  NEUROLOGY: AAOx2 (self, hospital), non-focal  SKIN: Normal color, No rashes or lesions Vital Signs last 24 Hrs  T(C): 38.5 (05-29-18 @ 19:45)  T(F): 101.3 (05-29-18 @ 19:45), Max: 104 (05-29-18 @ 13:48)  HR: 94 (05-29-18 @ 19:45) (94 - 127)  BP: 102/57 (05-29-18 @ 19:45) (78/49 - 118/66)  RR:  (22 - 30)  SpO2:  (70% - 100%)  Wt(kg): --    BIPAP breathing 24 - 34 rate, FiO2 50%, IPAP/EPEP 12/5    GENERAL: No acute distress, but lethargic, awakens to voice/touch  HEAD:  Atraumatic, Normocephalic  ENT: EOMI, PERRLA, conjunctiva and sclera clear, Neck supple, No JVD, mucous membranes dry  CHEST/LUNG: Crackles at bases; No wheeze, equal breath sounds bilaterally   HEART: Regular rate and rhythm; systolic murmur loudest in RUSB. No rubs, or gallops  ABDOMEN: Soft, Nontender, Nondistended; Bowel sounds present  EXTREMITIES:  No clubbing, cyanosis, or edema  PSYCH: Nl behavior, nl affect  NEUROLOGY: AAOx2 (self, hospital), non-focal  SKIN: Normal color, No rashes or lesions

## 2024-10-02 NOTE — PHYSICAL THERAPY INITIAL EVALUATION ADULT - NAME OF DISCHARGE PLANNER
Rx Refill Note  Requested Prescriptions     Pending Prescriptions Disp Refills    tiZANidine (ZANAFLEX) 4 MG tablet [Pharmacy Med Name: tizanidine 4 mg tablet] 90 tablet 0     Sig: TAKE ONE TABLET BY MOUTH EVERY 8 HOURS AS NEEDED FOR MUSCLE SPASMS      Last filled: 8/29/2024 90 with 0 refills.   Last office visit with prescribing clinician: 4/9/2024, TELEMEDICINE visit   Next office visit with prescribing clinician: Visit date not found     Lubna Lepe MA  10/02/24, 12:00 EDT    [place]

## 2025-02-17 NOTE — DISCHARGE NOTE ADULT - CARE PROVIDER_API CALL
Patient requested   Maria De Jesus Tran), Medicine  Geriatric Medicine  [address] [place] [address]  Phone: [phone]  Fax: [phone]